# Patient Record
Sex: MALE | Race: BLACK OR AFRICAN AMERICAN | NOT HISPANIC OR LATINO | URBAN - METROPOLITAN AREA
[De-identification: names, ages, dates, MRNs, and addresses within clinical notes are randomized per-mention and may not be internally consistent; named-entity substitution may affect disease eponyms.]

---

## 2022-05-22 ENCOUNTER — INPATIENT (INPATIENT)
Facility: HOSPITAL | Age: 62
LOS: 14 days | Discharge: NURSING, REHAB OR LONG-TERM CARE | DRG: 219 | End: 2022-06-06
Attending: THORACIC SURGERY (CARDIOTHORACIC VASCULAR SURGERY) | Admitting: THORACIC SURGERY (CARDIOTHORACIC VASCULAR SURGERY)
Payer: COMMERCIAL

## 2022-05-22 VITALS
SYSTOLIC BLOOD PRESSURE: 126 MMHG | WEIGHT: 179.9 LBS | RESPIRATION RATE: 18 BRPM | OXYGEN SATURATION: 93 % | DIASTOLIC BLOOD PRESSURE: 80 MMHG | HEIGHT: 71 IN

## 2022-05-22 LAB
A1C WITH ESTIMATED AVERAGE GLUCOSE RESULT: 4.9 % — SIGNIFICANT CHANGE UP (ref 4–5.6)
ALBUMIN SERPL ELPH-MCNC: 3.2 G/DL — LOW (ref 3.3–5)
ALP SERPL-CCNC: 86 U/L — SIGNIFICANT CHANGE UP (ref 40–120)
ALT FLD-CCNC: 45 U/L — SIGNIFICANT CHANGE UP (ref 10–45)
AMPHET UR-MCNC: NEGATIVE — SIGNIFICANT CHANGE UP
ANION GAP SERPL CALC-SCNC: 13 MMOL/L — SIGNIFICANT CHANGE UP (ref 5–17)
APTT BLD: 28.9 SEC — SIGNIFICANT CHANGE UP (ref 27.5–35.5)
AST SERPL-CCNC: 15 U/L — SIGNIFICANT CHANGE UP (ref 10–40)
BARBITURATES UR SCN-MCNC: NEGATIVE — SIGNIFICANT CHANGE UP
BASOPHILS # BLD AUTO: 0.02 K/UL — SIGNIFICANT CHANGE UP (ref 0–0.2)
BASOPHILS NFR BLD AUTO: 0.3 % — SIGNIFICANT CHANGE UP (ref 0–2)
BENZODIAZ UR-MCNC: NEGATIVE — SIGNIFICANT CHANGE UP
BILIRUB SERPL-MCNC: 0.3 MG/DL — SIGNIFICANT CHANGE UP (ref 0.2–1.2)
BLD GP AB SCN SERPL QL: NEGATIVE — SIGNIFICANT CHANGE UP
BUN SERPL-MCNC: 35 MG/DL — HIGH (ref 7–23)
CALCIUM SERPL-MCNC: 8.4 MG/DL — SIGNIFICANT CHANGE UP (ref 8.4–10.5)
CHLORIDE SERPL-SCNC: 101 MMOL/L — SIGNIFICANT CHANGE UP (ref 96–108)
CHOLEST SERPL-MCNC: 83 MG/DL — SIGNIFICANT CHANGE UP
CK MB CFR SERPL CALC: <1 NG/ML — SIGNIFICANT CHANGE UP (ref 0–6.7)
CK SERPL-CCNC: 33 U/L — SIGNIFICANT CHANGE UP (ref 30–200)
CO2 SERPL-SCNC: 22 MMOL/L — SIGNIFICANT CHANGE UP (ref 22–31)
COCAINE METAB.OTHER UR-MCNC: NEGATIVE — SIGNIFICANT CHANGE UP
CREAT SERPL-MCNC: 2.29 MG/DL — HIGH (ref 0.5–1.3)
EGFR: 32 ML/MIN/1.73M2 — LOW
EOSINOPHIL # BLD AUTO: 0.09 K/UL — SIGNIFICANT CHANGE UP (ref 0–0.5)
EOSINOPHIL NFR BLD AUTO: 1.3 % — SIGNIFICANT CHANGE UP (ref 0–6)
ESTIMATED AVERAGE GLUCOSE: 94 MG/DL — SIGNIFICANT CHANGE UP (ref 68–114)
GLUCOSE SERPL-MCNC: 139 MG/DL — HIGH (ref 70–99)
HCT VFR BLD CALC: 31.3 % — LOW (ref 39–50)
HDLC SERPL-MCNC: 28 MG/DL — LOW
HGB BLD-MCNC: 10.7 G/DL — LOW (ref 13–17)
IMM GRANULOCYTES NFR BLD AUTO: 0.3 % — SIGNIFICANT CHANGE UP (ref 0–1.5)
INR BLD: 1.26 — HIGH (ref 0.88–1.16)
LACTATE SERPL-SCNC: 0.8 MMOL/L — SIGNIFICANT CHANGE UP (ref 0.5–2)
LIPID PNL WITH DIRECT LDL SERPL: 39 MG/DL — SIGNIFICANT CHANGE UP
LYMPHOCYTES # BLD AUTO: 1.14 K/UL — SIGNIFICANT CHANGE UP (ref 1–3.3)
LYMPHOCYTES # BLD AUTO: 16.5 % — SIGNIFICANT CHANGE UP (ref 13–44)
MCHC RBC-ENTMCNC: 29.5 PG — SIGNIFICANT CHANGE UP (ref 27–34)
MCHC RBC-ENTMCNC: 34.2 GM/DL — SIGNIFICANT CHANGE UP (ref 32–36)
MCV RBC AUTO: 86.2 FL — SIGNIFICANT CHANGE UP (ref 80–100)
METHADONE UR-MCNC: NEGATIVE — SIGNIFICANT CHANGE UP
MONOCYTES # BLD AUTO: 0.89 K/UL — SIGNIFICANT CHANGE UP (ref 0–0.9)
MONOCYTES NFR BLD AUTO: 12.9 % — SIGNIFICANT CHANGE UP (ref 2–14)
NEUTROPHILS # BLD AUTO: 4.73 K/UL — SIGNIFICANT CHANGE UP (ref 1.8–7.4)
NEUTROPHILS NFR BLD AUTO: 68.7 % — SIGNIFICANT CHANGE UP (ref 43–77)
NON HDL CHOLESTEROL: 55 MG/DL — SIGNIFICANT CHANGE UP
NRBC # BLD: 0 /100 WBCS — SIGNIFICANT CHANGE UP (ref 0–0)
NT-PROBNP SERPL-SCNC: 361 PG/ML — HIGH (ref 0–300)
OPIATES UR-MCNC: NEGATIVE — SIGNIFICANT CHANGE UP
PCP SPEC-MCNC: SIGNIFICANT CHANGE UP
PCP UR-MCNC: NEGATIVE — SIGNIFICANT CHANGE UP
PLATELET # BLD AUTO: 185 K/UL — SIGNIFICANT CHANGE UP (ref 150–400)
POTASSIUM SERPL-MCNC: 3.6 MMOL/L — SIGNIFICANT CHANGE UP (ref 3.5–5.3)
POTASSIUM SERPL-SCNC: 3.6 MMOL/L — SIGNIFICANT CHANGE UP (ref 3.5–5.3)
PROT SERPL-MCNC: 7 G/DL — SIGNIFICANT CHANGE UP (ref 6–8.3)
PROTHROM AB SERPL-ACNC: 15 SEC — HIGH (ref 10.5–13.4)
RBC # BLD: 3.63 M/UL — LOW (ref 4.2–5.8)
RBC # FLD: 13.6 % — SIGNIFICANT CHANGE UP (ref 10.3–14.5)
RH IG SCN BLD-IMP: POSITIVE — SIGNIFICANT CHANGE UP
RH IG SCN BLD-IMP: POSITIVE — SIGNIFICANT CHANGE UP
SODIUM SERPL-SCNC: 136 MMOL/L — SIGNIFICANT CHANGE UP (ref 135–145)
THC UR QL: NEGATIVE — SIGNIFICANT CHANGE UP
TRIGL SERPL-MCNC: 80 MG/DL — SIGNIFICANT CHANGE UP
TROPONIN T SERPL-MCNC: 0.02 NG/ML — HIGH (ref 0–0.01)
WBC # BLD: 6.89 K/UL — SIGNIFICANT CHANGE UP (ref 3.8–10.5)
WBC # FLD AUTO: 6.89 K/UL — SIGNIFICANT CHANGE UP (ref 3.8–10.5)

## 2022-05-22 PROCEDURE — 71045 X-RAY EXAM CHEST 1 VIEW: CPT | Mod: 26

## 2022-05-22 PROCEDURE — 36600 WITHDRAWAL OF ARTERIAL BLOOD: CPT | Mod: RT

## 2022-05-22 PROCEDURE — 99291 CRITICAL CARE FIRST HOUR: CPT

## 2022-05-22 RX ORDER — ATORVASTATIN CALCIUM 80 MG/1
40 TABLET, FILM COATED ORAL AT BEDTIME
Refills: 0 | Status: DISCONTINUED | OUTPATIENT
Start: 2022-05-22 | End: 2022-05-24

## 2022-05-22 RX ORDER — DEXTROSE 50 % IN WATER 50 %
15 SYRINGE (ML) INTRAVENOUS ONCE
Refills: 0 | Status: DISCONTINUED | OUTPATIENT
Start: 2022-05-22 | End: 2022-05-22

## 2022-05-22 RX ORDER — POTASSIUM CHLORIDE 20 MEQ
10 PACKET (EA) ORAL ONCE
Refills: 0 | Status: COMPLETED | OUTPATIENT
Start: 2022-05-22 | End: 2022-05-22

## 2022-05-22 RX ORDER — ACETAMINOPHEN 500 MG
650 TABLET ORAL EVERY 6 HOURS
Refills: 0 | Status: DISCONTINUED | OUTPATIENT
Start: 2022-05-22 | End: 2022-05-29

## 2022-05-22 RX ORDER — PANTOPRAZOLE SODIUM 20 MG/1
40 TABLET, DELAYED RELEASE ORAL
Refills: 0 | Status: DISCONTINUED | OUTPATIENT
Start: 2022-05-22 | End: 2022-05-24

## 2022-05-22 RX ORDER — DEXTROSE 50 % IN WATER 50 %
25 SYRINGE (ML) INTRAVENOUS ONCE
Refills: 0 | Status: DISCONTINUED | OUTPATIENT
Start: 2022-05-22 | End: 2022-05-24

## 2022-05-22 RX ORDER — GLUCAGON INJECTION, SOLUTION 0.5 MG/.1ML
1 INJECTION, SOLUTION SUBCUTANEOUS ONCE
Refills: 0 | Status: DISCONTINUED | OUTPATIENT
Start: 2022-05-22 | End: 2022-05-24

## 2022-05-22 RX ORDER — INSULIN LISPRO 100/ML
VIAL (ML) SUBCUTANEOUS
Refills: 0 | Status: DISCONTINUED | OUTPATIENT
Start: 2022-05-22 | End: 2022-05-24

## 2022-05-22 RX ORDER — NICARDIPINE HYDROCHLORIDE 30 MG/1
5 CAPSULE, EXTENDED RELEASE ORAL
Qty: 40 | Refills: 0 | Status: DISCONTINUED | OUTPATIENT
Start: 2022-05-22 | End: 2022-05-24

## 2022-05-22 RX ORDER — SODIUM CHLORIDE 9 MG/ML
3 INJECTION INTRAMUSCULAR; INTRAVENOUS; SUBCUTANEOUS EVERY 8 HOURS
Refills: 0 | Status: DISCONTINUED | OUTPATIENT
Start: 2022-05-22 | End: 2022-06-06

## 2022-05-22 RX ORDER — SODIUM CHLORIDE 9 MG/ML
1000 INJECTION, SOLUTION INTRAVENOUS
Refills: 0 | Status: DISCONTINUED | OUTPATIENT
Start: 2022-05-22 | End: 2022-05-22

## 2022-05-22 RX ORDER — SODIUM CHLORIDE 9 MG/ML
1000 INJECTION, SOLUTION INTRAVENOUS
Refills: 0 | Status: DISCONTINUED | OUTPATIENT
Start: 2022-05-22 | End: 2022-05-25

## 2022-05-22 RX ADMIN — SODIUM CHLORIDE 3 MILLILITER(S): 9 INJECTION INTRAMUSCULAR; INTRAVENOUS; SUBCUTANEOUS at 22:12

## 2022-05-22 RX ADMIN — SODIUM CHLORIDE 50 MILLILITER(S): 9 INJECTION, SOLUTION INTRAVENOUS at 23:58

## 2022-05-22 RX ADMIN — Medication 10 MILLIEQUIVALENT(S): at 23:58

## 2022-05-22 RX ADMIN — ATORVASTATIN CALCIUM 40 MILLIGRAM(S): 80 TABLET, FILM COATED ORAL at 23:58

## 2022-05-22 RX ADMIN — NICARDIPINE HYDROCHLORIDE 25 MG/HR: 30 CAPSULE, EXTENDED RELEASE ORAL at 23:58

## 2022-05-22 NOTE — PATIENT PROFILE ADULT - FALL HARM RISK - RISK INTERVENTIONS
Assistance with ambulation/Communicate Fall Risk and Risk Factors to all staff, patient, and family/Reinforce activity limits and safety measures with patient and family/Visual Cue: Yellow wristband/Bed in lowest position, wheels locked, appropriate side rails in place/Call bell, personal items and telephone in reach/Instruct patient to call for assistance before getting out of bed or chair/Non-slip footwear when patient is out of bed/[place] to call system/Physically safe environment - no spills, clutter or unnecessary equipment/Purposeful Proactive Rounding/Room/bathroom lighting operational, light cord in reach

## 2022-05-22 NOTE — H&P ADULT - NSHPSOCIALHISTORY_GEN_ALL_CORE
Tobacco: current 1 pack a day smoker, 30 years  Alcohol: 1 drink socially a week  Elicit drugs: denies ever

## 2022-05-22 NOTE — H&P ADULT - HISTORY OF PRESENT ILLNESS
61 year old male with PMHx of HTN, HLD, Current smoker (30 pack year history), admitted to outside hospital with CP on 5/16/2022 , cardiac cath shows non-obstructive CAD, EF without valvulopathy and normal EF, upon discharge (5/21/2022) patient found to be febrile and underwent infectious work-up. CT chest shows thoracic aortic changes with follow up CTA chest showing chronic thrombosed dissection versus intramural hematoma along the thoracic aorta, small pericardial effusion and left pleural effusion vs hemothorax. Patient transferred to [facility] under the care of Dr. Ford for surgical evaluation.   61 year old male with PMHx of HTN, HLD, Current smoker (30 pack year history), admitted to outside hospital with CP on 5/16/2022 , cardiac cath shows non-obstructive CAD, EF without valvulopathy and normal EF, upon discharge (5/21/2022) patient found to be febrile and underwent infectious work-up. CT chest shows thoracic aortic changes with follow up CTA chest showing chronic thrombosed dissection versus intramural hematoma along the thoracic aorta, small pericardial effusion and left pleural effusion vs hemothorax. Patient transferred to [facility] under the care of Dr. Ford for surgical evaluation.  Upon arrival patient in no acute distress and denies all pain, patient states only has history of HTN but has been off medication for 6 months as he was unable to refill his medications.

## 2022-05-22 NOTE — PROGRESS NOTE ADULT - SUBJECTIVE AND OBJECTIVE BOX
INTERVAL HPI/OVERNIGHT EVENTS:    Intramural aortic hematoma    62yo AA Male Hx active tobacco use (30 pk years), HTN - nonadherent to meds, Chol presented to [facility] 5/18 with chest pain and severe HA - (+)NSTEMI - [trop 0.07] & uncontrolled HTN     CT Head - chronic Left basal ganglia lacunar infarct; chronic small vessel ischemia; no hemorrhage     Inc LFT and fever  Abd sono 5/20: hepatomegaly; left renal cyst; bilateral pleural effusion    Cr 1.8 at presentation  Cath: non-obstructive CAD, EF 55%; no valvular disease   coreg/catapress/norvasc/imdur/nifedipine started for BP control    anticipated d/c home 5/21 - developed abd pain - vomiting & fever (100.7) - d/c deferred  CT 5/21: crescentic hyperdensity in descending thoracic aorta and faintly in ascending thoracic aorta concerning for intramural hematoma (new c/w 9/2021 imaging). small-mod pericardial effusion    based on above findings -   CTa Chest 5/22: periaortic hematoma alone the length of the thoracic aortic extending to upper abd; No dissection flap identified. moderate pericardial effusion, small pleural effusions L>> Rt   CTa A/P 5/22: chronic thrombosed dissection vs intramural hematoma along thoracic aorta     transferred to [facility]  patient awake/alert and itneractive - denies chest pains sxs - none for days per patient  126/60 HR 60 sinus at presentation     PMHx includes but is not limited to:   HTN (hypertension)  HLD (hyperlipidemia)  Smoker  DM    ICU Vital Signs Last 24 Hrs  T(C): 36.8 (22 May 2022 22:18), Max: 36.8 (22 May 2022 22:18)  T(F): 98.3 (22 May 2022 22:18), Max: 98.3 (22 May 2022 22:18)  HR: 61 (22 May 2022 23:25) (61 - 63) sinus   BP: 113/68 (22 May 2022 23:25) (113/68 - 126/80)  BP(mean): 85 (22 May 2022 23:25) (85 - 98)  ABP: 134/72 (22 May 2022 23:25) (134/72 - 142/70)  ABP(mean): 90 (22 May 2022 23:25) (89 - 90)  RR: 15 (22 May 2022 23:25) (15 - 18)  SpO2: 94% (22 May 2022 23:25) (93% - 94%) RA    Qtts: None     I&O's Summary    Physical Exam    Heart - regular (-)rub/gallop  Lungs - CTA anterior (-)rub/gallop  Abd - (+) BS soft NTND (-)r/r/g  Ext - warm to touch; no cyanosis/clubbing - palpable peripheral pulses appreciated UE/LE bilaterally  Neuro - alert/oriented and interactive - non focal/following simple commands  Skin - no rash/lesions appreciated     LABS:                        10.7   6.89  )-----------( 185      ( 22 May 2022 22:36 )             31.3     05-22    136  |  101  |  35<H>  ----------------------------<  139<H>  3.6   |  22  |  2.29<H>    Ca    8.4      22 May 2022 22:39    TPro  7.0  /  Alb  3.2<L>  /  TBili  0.3  /  DBili  x   /  AST  15  /  ALT  45  /  AlkPhos  86  05-22    PT/INR - ( 22 May 2022 22:36 )   PT: 15.0 sec;   INR: 1.26     PTT - ( 22 May 2022 22:36 )  PTT:28.9 sec    RADIOLOGY & ADDITIONAL STUDIES:    Patient with initial presentation to [facility] with chest pain and uncontrolled HTN - NSTEMI - cath negative. Planned discharge when developed fever/vomiting and abd pain prompting imaging  - suspicious findings warranting further investigation and CTa performed - intramural aortic hematoma - transferred to [facility] for assessment and possible intervention     1. CV  hemodynamically stable/sinus rhythm   elise placed for closer BP monitoring - noted 20 mmHg higher that correlating cuff pressure  bradycardia noted - cardene infusion for now to maintain tight BP control  ECHO in am and may require additional imaging (3 phase scan) to better assess - defer to CTS after review of most recent images  LA 0.8 CPK 33/Trop 0.02   check Utox - patient denies drug or ETOH use   patient to inform immediately of any pain sxs - reviewed with patient  smoking cessation education and support when appropriate     2. Renal   CKD? - Cr 1.8 at presentation now Cr 2.29 (PARISH on CKD?)  s/p cath and CTa  may require additional contrast imaging to assess pathology and to best determine intervention required  start IVF now and monitor Cr/UO and lytes closely  avoid nephrotoxins    glycemic control - serum 139; HgA1c 4.9  COVID PCR per protocol - influenza and COVID testing negative at transferring institution   NPO for now    SCD and GI prophylaxis     d/w patient/staff and CTS     I have spent/provided stated minutes of critical care time to this patient: 60

## 2022-05-22 NOTE — H&P ADULT - ASSESSMENT
61 year old male with PMHx of HTN, HLD, Current smoker (30 pack year history), admitted to outside hospital with CP on 5/16/2022 , cardiac cath shows non-obstructive CAD, EF without valvulopathy and normal EF, upon discharge planning (5/21/2022) patient found to be febrile and underwent infectious work-up. CT chest shows thoracic aortic changes with follow up CTA chest showing chronic thrombosed dissection versus intramural hematoma along the thoracic aorta, small pericardial effusion and left pleural effusion vs hemothorax. Patient transferred to [facility] under the care of Dr. Ford for surgical evaluation.      Intramural Hematoma   - Repeat CT chest Dissection protocol in AM   - Right radial arterial line placed for blood pressure management      - Nicardipine gtt for SBP <120  - CXR, Urinalysis, Blood Work including: CMP, CBC, A1C, PTT/INR, Lipid panel, Cardiac enzymes, TSH, Pro-BNP, T&Sx2, ABG, TTE, PFT, EKG, Carotid Ultrasound, tox screen pending   - ASA and Heparin held in setting of hematoma       Pericardial effusion   - questionable pericardial effusion vs hemopericardium associated with dissection?   - No clinical evidence of tamponade   - Lactate pending  - TTE pending      HTN  - management as above     HLD  - Continue statin     Preventative   - SCD only, chemical VTE held insetting of dissection / pericardial effusion     Discussed with CTICU attending

## 2022-05-23 ENCOUNTER — TRANSCRIPTION ENCOUNTER (OUTPATIENT)
Age: 62
End: 2022-05-23

## 2022-05-23 PROBLEM — Z00.00 ENCOUNTER FOR PREVENTIVE HEALTH EXAMINATION: Status: ACTIVE | Noted: 2022-05-23

## 2022-05-23 LAB
A1C WITH ESTIMATED AVERAGE GLUCOSE RESULT: 5.1 % — SIGNIFICANT CHANGE UP (ref 4–5.6)
ALBUMIN SERPL ELPH-MCNC: 2.9 G/DL — LOW (ref 3.3–5)
ALBUMIN SERPL ELPH-MCNC: 3.2 G/DL — LOW (ref 3.3–5)
ALBUMIN SERPL ELPH-MCNC: 3.3 G/DL — SIGNIFICANT CHANGE UP (ref 3.3–5)
ALP SERPL-CCNC: 76 U/L — SIGNIFICANT CHANGE UP (ref 40–120)
ALP SERPL-CCNC: 79 U/L — SIGNIFICANT CHANGE UP (ref 40–120)
ALP SERPL-CCNC: 83 U/L — SIGNIFICANT CHANGE UP (ref 40–120)
ALT FLD-CCNC: 34 U/L — SIGNIFICANT CHANGE UP (ref 10–45)
ALT FLD-CCNC: 37 U/L — SIGNIFICANT CHANGE UP (ref 10–45)
ALT FLD-CCNC: 41 U/L — SIGNIFICANT CHANGE UP (ref 10–45)
ANION GAP SERPL CALC-SCNC: 10 MMOL/L — SIGNIFICANT CHANGE UP (ref 5–17)
ANION GAP SERPL CALC-SCNC: 11 MMOL/L — SIGNIFICANT CHANGE UP (ref 5–17)
ANION GAP SERPL CALC-SCNC: 11 MMOL/L — SIGNIFICANT CHANGE UP (ref 5–17)
ANION GAP SERPL CALC-SCNC: 13 MMOL/L — SIGNIFICANT CHANGE UP (ref 5–17)
APPEARANCE UR: ABNORMAL
APTT BLD: 26.7 SEC — LOW (ref 27.5–35.5)
APTT BLD: 27.1 SEC — LOW (ref 27.5–35.5)
APTT BLD: 27.3 SEC — LOW (ref 27.5–35.5)
AST SERPL-CCNC: 11 U/L — SIGNIFICANT CHANGE UP (ref 10–40)
AST SERPL-CCNC: 13 U/L — SIGNIFICANT CHANGE UP (ref 10–40)
AST SERPL-CCNC: 14 U/L — SIGNIFICANT CHANGE UP (ref 10–40)
BACTERIA # UR AUTO: PRESENT /HPF
BILIRUB SERPL-MCNC: 0.4 MG/DL — SIGNIFICANT CHANGE UP (ref 0.2–1.2)
BILIRUB SERPL-MCNC: 0.4 MG/DL — SIGNIFICANT CHANGE UP (ref 0.2–1.2)
BILIRUB SERPL-MCNC: 0.6 MG/DL — SIGNIFICANT CHANGE UP (ref 0.2–1.2)
BILIRUB UR-MCNC: NEGATIVE — SIGNIFICANT CHANGE UP
BUN SERPL-MCNC: 22 MG/DL — SIGNIFICANT CHANGE UP (ref 7–23)
BUN SERPL-MCNC: 28 MG/DL — HIGH (ref 7–23)
BUN SERPL-MCNC: 28 MG/DL — HIGH (ref 7–23)
BUN SERPL-MCNC: 32 MG/DL — HIGH (ref 7–23)
CALCIUM SERPL-MCNC: 8.2 MG/DL — LOW (ref 8.4–10.5)
CALCIUM SERPL-MCNC: 8.4 MG/DL — SIGNIFICANT CHANGE UP (ref 8.4–10.5)
CALCIUM SERPL-MCNC: 8.4 MG/DL — SIGNIFICANT CHANGE UP (ref 8.4–10.5)
CALCIUM SERPL-MCNC: 8.6 MG/DL — SIGNIFICANT CHANGE UP (ref 8.4–10.5)
CHLORIDE SERPL-SCNC: 102 MMOL/L — SIGNIFICANT CHANGE UP (ref 96–108)
CHLORIDE SERPL-SCNC: 103 MMOL/L — SIGNIFICANT CHANGE UP (ref 96–108)
CO2 SERPL-SCNC: 21 MMOL/L — LOW (ref 22–31)
CO2 SERPL-SCNC: 21 MMOL/L — LOW (ref 22–31)
CO2 SERPL-SCNC: 22 MMOL/L — SIGNIFICANT CHANGE UP (ref 22–31)
CO2 SERPL-SCNC: 22 MMOL/L — SIGNIFICANT CHANGE UP (ref 22–31)
COLOR SPEC: YELLOW — SIGNIFICANT CHANGE UP
COMMENT - URINE: SIGNIFICANT CHANGE UP
COMMENT - URINE: SIGNIFICANT CHANGE UP
CREAT SERPL-MCNC: 1.9 MG/DL — HIGH (ref 0.5–1.3)
CREAT SERPL-MCNC: 1.98 MG/DL — HIGH (ref 0.5–1.3)
CREAT SERPL-MCNC: 2.06 MG/DL — HIGH (ref 0.5–1.3)
CREAT SERPL-MCNC: 2.12 MG/DL — HIGH (ref 0.5–1.3)
DIFF PNL FLD: ABNORMAL
EGFR: 35 ML/MIN/1.73M2 — LOW
EGFR: 36 ML/MIN/1.73M2 — LOW
EGFR: 38 ML/MIN/1.73M2 — LOW
EGFR: 40 ML/MIN/1.73M2 — LOW
EPI CELLS # UR: SIGNIFICANT CHANGE UP /HPF (ref 0–5)
ESTIMATED AVERAGE GLUCOSE: 100 MG/DL — SIGNIFICANT CHANGE UP (ref 68–114)
GAS PNL BLDA: SIGNIFICANT CHANGE UP
GLUCOSE BLDC GLUCOMTR-MCNC: 124 MG/DL — HIGH (ref 70–99)
GLUCOSE BLDC GLUCOMTR-MCNC: 125 MG/DL — HIGH (ref 70–99)
GLUCOSE BLDC GLUCOMTR-MCNC: 161 MG/DL — HIGH (ref 70–99)
GLUCOSE SERPL-MCNC: 123 MG/DL — HIGH (ref 70–99)
GLUCOSE SERPL-MCNC: 125 MG/DL — HIGH (ref 70–99)
GLUCOSE SERPL-MCNC: 141 MG/DL — HIGH (ref 70–99)
GLUCOSE SERPL-MCNC: 208 MG/DL — HIGH (ref 70–99)
GLUCOSE UR QL: NEGATIVE — SIGNIFICANT CHANGE UP
HCT VFR BLD CALC: 30.6 % — LOW (ref 39–50)
HCT VFR BLD CALC: 31.2 % — LOW (ref 39–50)
HCT VFR BLD CALC: 32 % — LOW (ref 39–50)
HCT VFR BLD CALC: 32.8 % — LOW (ref 39–50)
HCV AB S/CO SERPL IA: 0.04 S/CO — SIGNIFICANT CHANGE UP
HCV AB SERPL-IMP: SIGNIFICANT CHANGE UP
HGB BLD-MCNC: 10.4 G/DL — LOW (ref 13–17)
HGB BLD-MCNC: 10.5 G/DL — LOW (ref 13–17)
HGB BLD-MCNC: 10.9 G/DL — LOW (ref 13–17)
HGB BLD-MCNC: 11.2 G/DL — LOW (ref 13–17)
HYALINE CASTS # UR AUTO: SIGNIFICANT CHANGE UP /LPF (ref 0–2)
INR BLD: 1.25 — HIGH (ref 0.88–1.16)
INR BLD: 1.25 — HIGH (ref 0.88–1.16)
INR BLD: 1.31 — HIGH (ref 0.88–1.16)
KETONES UR-MCNC: NEGATIVE — SIGNIFICANT CHANGE UP
LACTATE SERPL-SCNC: 0.6 MMOL/L — SIGNIFICANT CHANGE UP (ref 0.5–2)
LACTATE SERPL-SCNC: 0.7 MMOL/L — SIGNIFICANT CHANGE UP (ref 0.5–2)
LACTATE SERPL-SCNC: 1.3 MMOL/L — SIGNIFICANT CHANGE UP (ref 0.5–2)
LEUKOCYTE ESTERASE UR-ACNC: NEGATIVE — SIGNIFICANT CHANGE UP
MAGNESIUM SERPL-MCNC: 2 MG/DL — SIGNIFICANT CHANGE UP (ref 1.6–2.6)
MAGNESIUM SERPL-MCNC: 2.1 MG/DL — SIGNIFICANT CHANGE UP (ref 1.6–2.6)
MAGNESIUM SERPL-MCNC: 2.1 MG/DL — SIGNIFICANT CHANGE UP (ref 1.6–2.6)
MAGNESIUM SERPL-MCNC: 2.2 MG/DL — SIGNIFICANT CHANGE UP (ref 1.6–2.6)
MCHC RBC-ENTMCNC: 28.8 PG — SIGNIFICANT CHANGE UP (ref 27–34)
MCHC RBC-ENTMCNC: 29.2 PG — SIGNIFICANT CHANGE UP (ref 27–34)
MCHC RBC-ENTMCNC: 29.8 PG — SIGNIFICANT CHANGE UP (ref 27–34)
MCHC RBC-ENTMCNC: 29.9 PG — SIGNIFICANT CHANGE UP (ref 27–34)
MCHC RBC-ENTMCNC: 33.7 GM/DL — SIGNIFICANT CHANGE UP (ref 32–36)
MCHC RBC-ENTMCNC: 34 GM/DL — SIGNIFICANT CHANGE UP (ref 32–36)
MCHC RBC-ENTMCNC: 34.1 GM/DL — SIGNIFICANT CHANGE UP (ref 32–36)
MCHC RBC-ENTMCNC: 34.1 GM/DL — SIGNIFICANT CHANGE UP (ref 32–36)
MCV RBC AUTO: 85.7 FL — SIGNIFICANT CHANGE UP (ref 80–100)
MCV RBC AUTO: 86 FL — SIGNIFICANT CHANGE UP (ref 80–100)
MCV RBC AUTO: 87.4 FL — SIGNIFICANT CHANGE UP (ref 80–100)
MCV RBC AUTO: 87.7 FL — SIGNIFICANT CHANGE UP (ref 80–100)
NITRITE UR-MCNC: NEGATIVE — SIGNIFICANT CHANGE UP
NRBC # BLD: 0 /100 WBCS — SIGNIFICANT CHANGE UP (ref 0–0)
PH UR: 5.5 — SIGNIFICANT CHANGE UP (ref 5–8)
PHOSPHATE SERPL-MCNC: 3 MG/DL — SIGNIFICANT CHANGE UP (ref 2.5–4.5)
PHOSPHATE SERPL-MCNC: 3 MG/DL — SIGNIFICANT CHANGE UP (ref 2.5–4.5)
PHOSPHATE SERPL-MCNC: 3.3 MG/DL — SIGNIFICANT CHANGE UP (ref 2.5–4.5)
PHOSPHATE SERPL-MCNC: 3.3 MG/DL — SIGNIFICANT CHANGE UP (ref 2.5–4.5)
PLATELET # BLD AUTO: 182 K/UL — SIGNIFICANT CHANGE UP (ref 150–400)
PLATELET # BLD AUTO: 193 K/UL — SIGNIFICANT CHANGE UP (ref 150–400)
PLATELET # BLD AUTO: 199 K/UL — SIGNIFICANT CHANGE UP (ref 150–400)
PLATELET # BLD AUTO: 203 K/UL — SIGNIFICANT CHANGE UP (ref 150–400)
POTASSIUM SERPL-MCNC: 3.5 MMOL/L — SIGNIFICANT CHANGE UP (ref 3.5–5.3)
POTASSIUM SERPL-MCNC: 3.6 MMOL/L — SIGNIFICANT CHANGE UP (ref 3.5–5.3)
POTASSIUM SERPL-MCNC: 3.6 MMOL/L — SIGNIFICANT CHANGE UP (ref 3.5–5.3)
POTASSIUM SERPL-MCNC: 4.1 MMOL/L — SIGNIFICANT CHANGE UP (ref 3.5–5.3)
POTASSIUM SERPL-SCNC: 3.5 MMOL/L — SIGNIFICANT CHANGE UP (ref 3.5–5.3)
POTASSIUM SERPL-SCNC: 3.6 MMOL/L — SIGNIFICANT CHANGE UP (ref 3.5–5.3)
POTASSIUM SERPL-SCNC: 3.6 MMOL/L — SIGNIFICANT CHANGE UP (ref 3.5–5.3)
POTASSIUM SERPL-SCNC: 4.1 MMOL/L — SIGNIFICANT CHANGE UP (ref 3.5–5.3)
PROT SERPL-MCNC: 6.4 G/DL — SIGNIFICANT CHANGE UP (ref 6–8.3)
PROT SERPL-MCNC: 7 G/DL — SIGNIFICANT CHANGE UP (ref 6–8.3)
PROT SERPL-MCNC: 7.2 G/DL — SIGNIFICANT CHANGE UP (ref 6–8.3)
PROT UR-MCNC: ABNORMAL MG/DL
PROTHROM AB SERPL-ACNC: 14.9 SEC — HIGH (ref 10.5–13.4)
PROTHROM AB SERPL-ACNC: 14.9 SEC — HIGH (ref 10.5–13.4)
PROTHROM AB SERPL-ACNC: 15.6 SEC — HIGH (ref 10.5–13.4)
RBC # BLD: 3.56 M/UL — LOW (ref 4.2–5.8)
RBC # BLD: 3.64 M/UL — LOW (ref 4.2–5.8)
RBC # BLD: 3.66 M/UL — LOW (ref 4.2–5.8)
RBC # BLD: 3.74 M/UL — LOW (ref 4.2–5.8)
RBC # FLD: 13.6 % — SIGNIFICANT CHANGE UP (ref 10.3–14.5)
RBC # FLD: 13.8 % — SIGNIFICANT CHANGE UP (ref 10.3–14.5)
RBC # FLD: 13.9 % — SIGNIFICANT CHANGE UP (ref 10.3–14.5)
RBC # FLD: 14 % — SIGNIFICANT CHANGE UP (ref 10.3–14.5)
RBC CASTS # UR COMP ASSIST: < 5 /HPF — SIGNIFICANT CHANGE UP
SARS-COV-2 RNA SPEC QL NAA+PROBE: SIGNIFICANT CHANGE UP
SODIUM SERPL-SCNC: 133 MMOL/L — LOW (ref 135–145)
SODIUM SERPL-SCNC: 135 MMOL/L — SIGNIFICANT CHANGE UP (ref 135–145)
SODIUM SERPL-SCNC: 136 MMOL/L — SIGNIFICANT CHANGE UP (ref 135–145)
SODIUM SERPL-SCNC: 136 MMOL/L — SIGNIFICANT CHANGE UP (ref 135–145)
SP GR SPEC: 1.02 — SIGNIFICANT CHANGE UP (ref 1–1.03)
TSH SERPL-MCNC: 2.66 UIU/ML — SIGNIFICANT CHANGE UP (ref 0.27–4.2)
UROBILINOGEN FLD QL: 0.2 E.U./DL — SIGNIFICANT CHANGE UP
WBC # BLD: 6.36 K/UL — SIGNIFICANT CHANGE UP (ref 3.8–10.5)
WBC # BLD: 6.39 K/UL — SIGNIFICANT CHANGE UP (ref 3.8–10.5)
WBC # BLD: 6.72 K/UL — SIGNIFICANT CHANGE UP (ref 3.8–10.5)
WBC # BLD: 6.85 K/UL — SIGNIFICANT CHANGE UP (ref 3.8–10.5)
WBC # FLD AUTO: 6.36 K/UL — SIGNIFICANT CHANGE UP (ref 3.8–10.5)
WBC # FLD AUTO: 6.39 K/UL — SIGNIFICANT CHANGE UP (ref 3.8–10.5)
WBC # FLD AUTO: 6.72 K/UL — SIGNIFICANT CHANGE UP (ref 3.8–10.5)
WBC # FLD AUTO: 6.85 K/UL — SIGNIFICANT CHANGE UP (ref 3.8–10.5)
WBC UR QL: < 5 /HPF — SIGNIFICANT CHANGE UP

## 2022-05-23 PROCEDURE — 99292 CRITICAL CARE ADDL 30 MIN: CPT

## 2022-05-23 PROCEDURE — 99291 CRITICAL CARE FIRST HOUR: CPT

## 2022-05-23 PROCEDURE — 93880 EXTRACRANIAL BILAT STUDY: CPT | Mod: 26

## 2022-05-23 PROCEDURE — 93306 TTE W/DOPPLER COMPLETE: CPT | Mod: 26

## 2022-05-23 PROCEDURE — 70450 CT HEAD/BRAIN W/O DYE: CPT | Mod: 26

## 2022-05-23 PROCEDURE — 93010 ELECTROCARDIOGRAM REPORT: CPT

## 2022-05-23 PROCEDURE — 71275 CT ANGIOGRAPHY CHEST: CPT | Mod: 26

## 2022-05-23 PROCEDURE — 74174 CTA ABD&PLVS W/CONTRAST: CPT | Mod: 26

## 2022-05-23 RX ORDER — AMLODIPINE BESYLATE 2.5 MG/1
5 TABLET ORAL DAILY
Refills: 0 | Status: DISCONTINUED | OUTPATIENT
Start: 2022-05-23 | End: 2022-05-24

## 2022-05-23 RX ORDER — CHLORHEXIDINE GLUCONATE 213 G/1000ML
1 SOLUTION TOPICAL ONCE
Refills: 0 | Status: COMPLETED | OUTPATIENT
Start: 2022-05-23 | End: 2022-05-23

## 2022-05-23 RX ORDER — POTASSIUM CHLORIDE 20 MEQ
40 PACKET (EA) ORAL ONCE
Refills: 0 | Status: COMPLETED | OUTPATIENT
Start: 2022-05-23 | End: 2022-05-23

## 2022-05-23 RX ORDER — LABETALOL HCL 100 MG
10 TABLET ORAL ONCE
Refills: 0 | Status: COMPLETED | OUTPATIENT
Start: 2022-05-23 | End: 2022-05-23

## 2022-05-23 RX ORDER — ESMOLOL HCL 100MG/10ML
50 VIAL (ML) INTRAVENOUS
Qty: 2500 | Refills: 0 | Status: DISCONTINUED | OUTPATIENT
Start: 2022-05-23 | End: 2022-05-24

## 2022-05-23 RX ORDER — CHLORHEXIDINE GLUCONATE 213 G/1000ML
1 SOLUTION TOPICAL ONCE
Refills: 0 | Status: COMPLETED | OUTPATIENT
Start: 2022-05-24 | End: 2022-05-24

## 2022-05-23 RX ORDER — LABETALOL HCL 100 MG
4 TABLET ORAL
Qty: 1000 | Refills: 0 | Status: DISCONTINUED | OUTPATIENT
Start: 2022-05-23 | End: 2022-05-23

## 2022-05-23 RX ORDER — LABETALOL HCL 100 MG
1 TABLET ORAL
Qty: 400 | Refills: 0 | Status: DISCONTINUED | OUTPATIENT
Start: 2022-05-23 | End: 2022-05-24

## 2022-05-23 RX ORDER — CHLORHEXIDINE GLUCONATE 213 G/1000ML
5 SOLUTION TOPICAL ONCE
Refills: 0 | Status: COMPLETED | OUTPATIENT
Start: 2022-05-23 | End: 2022-05-23

## 2022-05-23 RX ADMIN — Medication 40 MILLIEQUIVALENT(S): at 17:34

## 2022-05-23 RX ADMIN — SODIUM CHLORIDE 3 MILLILITER(S): 9 INJECTION INTRAMUSCULAR; INTRAVENOUS; SUBCUTANEOUS at 05:09

## 2022-05-23 RX ADMIN — PANTOPRAZOLE SODIUM 40 MILLIGRAM(S): 20 TABLET, DELAYED RELEASE ORAL at 07:33

## 2022-05-23 RX ADMIN — CHLORHEXIDINE GLUCONATE 1 APPLICATION(S): 213 SOLUTION TOPICAL at 20:39

## 2022-05-23 RX ADMIN — Medication 10 MILLIGRAM(S): at 08:00

## 2022-05-23 RX ADMIN — NICARDIPINE HYDROCHLORIDE 25 MG/HR: 30 CAPSULE, EXTENDED RELEASE ORAL at 07:33

## 2022-05-23 RX ADMIN — SODIUM CHLORIDE 3 MILLILITER(S): 9 INJECTION INTRAMUSCULAR; INTRAVENOUS; SUBCUTANEOUS at 20:40

## 2022-05-23 RX ADMIN — NICARDIPINE HYDROCHLORIDE 25 MG/HR: 30 CAPSULE, EXTENDED RELEASE ORAL at 04:04

## 2022-05-23 RX ADMIN — NICARDIPINE HYDROCHLORIDE 25 MG/HR: 30 CAPSULE, EXTENDED RELEASE ORAL at 23:30

## 2022-05-23 RX ADMIN — AMLODIPINE BESYLATE 5 MILLIGRAM(S): 2.5 TABLET ORAL at 07:57

## 2022-05-23 RX ADMIN — Medication 2: at 21:38

## 2022-05-23 RX ADMIN — Medication 60 MG/MIN: at 09:23

## 2022-05-23 RX ADMIN — Medication 60 MG/MIN: at 15:49

## 2022-05-23 RX ADMIN — ATORVASTATIN CALCIUM 40 MILLIGRAM(S): 80 TABLET, FILM COATED ORAL at 21:38

## 2022-05-23 RX ADMIN — Medication 24.5 MICROGRAM(S)/KG/MIN: at 21:38

## 2022-05-23 RX ADMIN — CHLORHEXIDINE GLUCONATE 1 APPLICATION(S): 213 SOLUTION TOPICAL at 20:37

## 2022-05-23 RX ADMIN — Medication 60 MG/MIN: at 13:24

## 2022-05-23 RX ADMIN — SODIUM CHLORIDE 3 MILLILITER(S): 9 INJECTION INTRAMUSCULAR; INTRAVENOUS; SUBCUTANEOUS at 15:23

## 2022-05-23 NOTE — CONSULT NOTE ADULT - SUBJECTIVE AND OBJECTIVE BOX
**Stroke/Neurology Consult***    HPI: 61 year old male with PMHx of HTN, HLD, Current smoker (30 pack year history), admitted to outside hospital with CP on 2022 , cardiac cath shows non-obstructive CAD, EF without valvulopathy and normal EF, upon discharge (2022) patient found to be febrile and underwent infectious work-up. CT chest shows thoracic aortic changes with follow up CTA chest showing chronic thrombosed dissection versus intramural hematoma along the thoracic aorta, small pericardial effusion and left pleural effusion vs hemothorax. Patient transferred to [facility] under the care of Dr. Ford for surgical evaluation.  Upon arrival patient in no acute distress and denies all pain, patient states only has history of HTN but has been off medication for 6 months as he was unable to refill his medications.     Pt seen and examined for baseline neurologic exam prior to aortic dissection repair.  Pt does not want to talk about his medical history or if he has a history of neurologic events.     PAST MEDICAL & SURGICAL HISTORY:  HTN (hypertension)      HLD (hyperlipidemia)      Smoker      No significant past surgical history          FAMILY HISTORY:  No pertinent family history in first degree relatives        SOCIAL HISTORY:   Smoking status: 30 pack years  Drinking: does not participate   Drug Use: does not participate.    ROS: ***  Constitutional: No fever, weight loss or fatigue  Eyes: No eye pain, visual disturbances, or discharge  ENMT:  No difficulty hearing, tinnitus, vertigo; No sinus or throat pain  Neck: No pain or stiffness  Respiratory: No cough, wheezing, chills or hemoptysis  Cardiovascular: No chest pain, palpitations, shortness of breath, dizziness or leg swelling  Gastrointestinal: No abdominal pain. No nausea, vomiting or hematemesis; No diarrhea or constipation. Nohematochezia.  Genitourinary: No dysuria, frequency, hematuria or incontinence  Neurological: As per HPI  Skin: No itching, burning, rashes or lesions   Endocrine: No heat or cold intolerance; No hair loss  Musculoskeletal: No joint pain or swelling; No muscle, back or extremity pain  Psychiatric: No depression, anxiety, mood swings or difficulty sleeping  Heme/Lymph: No easy bruising or bleeding gums    T(C): 36.8 (22 @ 14:25), Max: 37.1 (22 @ 00:54)  HR: 56 (22 @ 16:00) (56 - 69)  BP: 117/70 (22 @ 10:00) (111/66 - 138/80)  RR: 14 (22 @ 16:00) (14 - 18)  SpO2: 97% (22 @ 16:00) (93% - 97%)    MEDICATION RECONCILIATION   MEDICATIONS  (STANDING):  amLODIPine   Tablet 5 milliGRAM(s) Oral daily  atorvastatin 40 milliGRAM(s) Oral at bedtime  chlorhexidine 0.12% Liquid 5 milliLiter(s) Swish and Spit once  chlorhexidine 4% Liquid 1 Application(s) Topical once  chlorhexidine 4% Liquid 1 Application(s) Topical once  dextrose 50% Injectable 25 Gram(s) IV Push once  glucagon  Injectable 1 milliGRAM(s) IntraMuscular once  insulin lispro (ADMELOG) corrective regimen sliding scale   SubCutaneous Before meals and at bedtime  labetalol Infusion 4 mG/Min (60 mL/Hr) IV Continuous <Continuous>  labetalol Infusion 1 mG/Min (60 mL/Hr) IV Continuous <Continuous>  lactated ringers. 1000 milliLiter(s) (50 mL/Hr) IV Continuous <Continuous>  niCARdipine Infusion 5 mG/Hr (25 mL/Hr) IV Continuous <Continuous>  pantoprazole    Tablet 40 milliGRAM(s) Oral before breakfast  potassium chloride   Powder 40 milliEquivalent(s) Oral once  sodium chloride 0.9% lock flush 3 milliLiter(s) IV Push every 8 hours    MEDICATIONS  (PRN):  acetaminophen     Tablet .. 650 milliGRAM(s) Oral every 6 hours PRN Mild Pain (1 - 3)    Allergies    No Known Allergies    Intolerances      Vital Signs Last 24 Hrs  T(C): 36.8 (23 May 2022 14:25), Max: 37.1 (23 May 2022 00:54)  T(F): 98.2 (23 May 2022 14:25), Max: 98.7 (23 May 2022 00:54)  HR: 56 (23 May 2022 16:00) (56 - 69)  BP: 117/70 (23 May 2022 10:00) (111/66 - 138/80)  BP(mean): 88 (23 May 2022 10:00) (84 - 104)  RR: 14 (23 May 2022 16:00) (14 - 18)  SpO2: 97% (23 May 2022 16:00) (93% - 97%)    Physical exam:  General: No acute distress, awake and alert  Cardiovascular: Regular rate and rhythm, no murmurs, rubs, or gallops. No bruits  Pulmonary: Anterior breath sounds clear bilaterally, no crackles or wheezing. No use of accessory muscles  GI: Abdomen soft, non-tender, non-distended    Neurologic:  -Mental status:     ****INCOMPLETE*****       Awake, alert, oriented to person, place, and time. Speech is fluent with intact naming, repetition, and comprehension, no dysarthria. Recent and remote memory intact. Follows commands. Attention/concentration intact. Fund of knowledge appropriate.  -Cranial nerves:   II: Visual fields are full to confrontation.  III, IV, VI: Extraocular movements are intact without nystagmus. Pupils equally round and reactive to light  V:  Facial sensation V1-V3 equal and intact   VII: Face is symmetric with normal eye closure and smile  VIII: Hearing is bilaterally intact to finger rub  IX, X: Uvula is midline and soft palate rises symmetrically  XI: Head turning and shoulder shrug are intact.  XII: Tongue protrudes midline  Motor: Normal bulk and tone. No pronator drift. Strength bilateral upper extremity 5/5, bilateral lower extremities 5/5.  Rapid alternating movements intact and symmetric  Sensation: Intact to light touch bilaterally. No neglect or extinction on double simultaneous testing.  Coordination: No dysmetria on finger-to-nose and heel-to-shin bilaterally  Reflexes: Downgoing toes bilaterally   Gait: Narrow gait and steady    NIHSS: **** ASPECT Score: *** ICH Score: *** (GCS)    Fingerstick Blood Glucose: CAPILLARY BLOOD GLUCOSE      POCT Blood Glucose.: 124 mg/dL (23 May 2022 12:45)    LABS:                        10.9   6.39  )-----------( 203      ( 23 May 2022 13:49 )             32.0         135  |  102  |  28<H>  ----------------------------<  141<H>  3.6   |  22  |  1.90<H>    Ca    8.4      23 May 2022 13:49  Phos  3.3       Mg     2.1         TPro  7.0  /  Alb  3.2<L>  /  TBili  0.4  /  DBili  x   /  AST  13  /  ALT  37  /  AlkPhos  83      PT/INR - ( 23 May 2022 13:49 )   PT: 14.9 sec;   INR: 1.25          PTT - ( 23 May 2022 13:49 )  PTT:27.3 sec  CARDIAC MARKERS ( 22 May 2022 22:39 )  x     / 0.02 ng/mL / 33 U/L / x     / <1.0 ng/mL      Urinalysis Basic - ( 22 May 2022 23:20 )    Color: Yellow / Appearance: SL Cloudy / S.025 / pH: x  Gluc: x / Ketone: NEGATIVE  / Bili: Negative / Urobili: 0.2 E.U./dL   Blood: x / Protein: Trace mg/dL / Nitrite: NEGATIVE   Leuk Esterase: NEGATIVE / RBC: < 5 /HPF / WBC < 5 /HPF   Sq Epi: x / Non Sq Epi: 0-5 /HPF / Bacteria: Present /HPF        RADIOLOGY & ADDITIONAL STUDIES:    HCT: There is no evidence of acute infarction, intracranial hemorrhage or mass   lesion.    There is hypoattenuation of the subcortical and periventricular white   matter, which is nonspecific finding, but most likely represents sequela   of chronic microvascular ischemic disease. There are chronic lacunar   infarcts in the bilateral basal ganglia, more pronounced on the left side   There are atherosclerotic calcifications of the cavernous internal   carotid arteries bilaterally. There is prominence of the cortical sulci   related to underlying brain parenchymal volume loss.    There is no evidence of hydrocephalus. There are no extra-axial fluid   collections. There is an enlarged, partially empty sella turcica.    The visualized intraorbital contents are normal. The imaged portions of   the paranasal sinuses are aerated. The mastoid air cells are clear. The   visualized soft tissues and osseous structures appear normal.      IMPRESSION:    No acute intracranial findings.    --- End of Report ---        ASSESSMENT/PLAN:    61y Male w/ PMH ***. HCT showed ***. CTA showed ***. Given *** tPA was ***. Patient was admitted to **** for further workup    **Stroke/Neurology Consult***    HPI: 61 year old male with PMHx of HTN, HLD, Current smoker (30 pack year history), admitted to outside hospital with CP on 2022 , cardiac cath shows non-obstructive CAD, EF without valvulopathy and normal EF, upon discharge (2022) patient found to be febrile and underwent infectious work-up. CT chest shows thoracic aortic changes with follow up CTA chest showing chronic thrombosed dissection versus intramural hematoma along the thoracic aorta, small pericardial effusion and left pleural effusion vs hemothorax. Patient transferred to [facility] under the care of Dr. Ford for surgical evaluation.  Upon arrival patient in no acute distress and denies all pain, patient states only has history of HTN but has been off medication for 6 months as he was unable to refill his medications.     Pt seen and examined for baseline neurologic exam prior to aortic dissection repair.  Pt does not want to talk about his medical history or if he has a history of neurologic events.     PAST MEDICAL & SURGICAL HISTORY:  HTN (hypertension)      HLD (hyperlipidemia)      Smoker      No significant past surgical history          FAMILY HISTORY:  No pertinent family history in first degree relatives        SOCIAL HISTORY:   Smoking status: 30 pack years  Drinking: does not participate   Drug Use: does not participate.    ROS: ***  Constitutional: No fever, weight loss or fatigue  Eyes: No eye pain, visual disturbances, or discharge  ENMT:  No difficulty hearing, tinnitus, vertigo; No sinus or throat pain  Neck: No pain or stiffness  Respiratory: No cough, wheezing, chills or hemoptysis  Cardiovascular: No chest pain, palpitations, shortness of breath, dizziness or leg swelling  Gastrointestinal: No abdominal pain. No nausea, vomiting or hematemesis; No diarrhea or constipation. Nohematochezia.  Genitourinary: No dysuria, frequency, hematuria or incontinence  Neurological: As per HPI  Skin: No itching, burning, rashes or lesions   Endocrine: No heat or cold intolerance; No hair loss  Musculoskeletal: No joint pain or swelling; No muscle, back or extremity pain  Psychiatric: No depression, anxiety, mood swings or difficulty sleeping  Heme/Lymph: No easy bruising or bleeding gums    T(C): 36.8 (22 @ 14:25), Max: 37.1 (22 @ 00:54)  HR: 56 (22 @ 16:00) (56 - 69)  BP: 117/70 (22 @ 10:00) (111/66 - 138/80)  RR: 14 (22 @ 16:00) (14 - 18)  SpO2: 97% (22 @ 16:00) (93% - 97%)    MEDICATION RECONCILIATION   MEDICATIONS  (STANDING):  amLODIPine   Tablet 5 milliGRAM(s) Oral daily  atorvastatin 40 milliGRAM(s) Oral at bedtime  chlorhexidine 0.12% Liquid 5 milliLiter(s) Swish and Spit once  chlorhexidine 4% Liquid 1 Application(s) Topical once  chlorhexidine 4% Liquid 1 Application(s) Topical once  dextrose 50% Injectable 25 Gram(s) IV Push once  glucagon  Injectable 1 milliGRAM(s) IntraMuscular once  insulin lispro (ADMELOG) corrective regimen sliding scale   SubCutaneous Before meals and at bedtime  labetalol Infusion 4 mG/Min (60 mL/Hr) IV Continuous <Continuous>  labetalol Infusion 1 mG/Min (60 mL/Hr) IV Continuous <Continuous>  lactated ringers. 1000 milliLiter(s) (50 mL/Hr) IV Continuous <Continuous>  niCARdipine Infusion 5 mG/Hr (25 mL/Hr) IV Continuous <Continuous>  pantoprazole    Tablet 40 milliGRAM(s) Oral before breakfast  potassium chloride   Powder 40 milliEquivalent(s) Oral once  sodium chloride 0.9% lock flush 3 milliLiter(s) IV Push every 8 hours    MEDICATIONS  (PRN):  acetaminophen     Tablet .. 650 milliGRAM(s) Oral every 6 hours PRN Mild Pain (1 - 3)    Allergies    No Known Allergies    Intolerances      Vital Signs Last 24 Hrs  T(C): 36.8 (23 May 2022 14:25), Max: 37.1 (23 May 2022 00:54)  T(F): 98.2 (23 May 2022 14:25), Max: 98.7 (23 May 2022 00:54)  HR: 56 (23 May 2022 16:00) (56 - 69)  BP: 117/70 (23 May 2022 10:00) (111/66 - 138/80)  BP(mean): 88 (23 May 2022 10:00) (84 - 104)  RR: 14 (23 May 2022 16:00) (14 - 18)  SpO2: 97% (23 May 2022 16:00) (93% - 97%)    Physical exam:  General: No acute distress, awake and alert  Cardiovascular: Regular rate and rhythm, no murmurs, rubs, or gallops. No bruits  Pulmonary: Anterior breath sounds clear bilaterally, no crackles or wheezing. No use of accessory muscles  GI: Abdomen soft, non-tender, non-distended    Neurologic:  -Mental status:    Awake, alert, oriented to person only but not to time. Follows one-step commands only. No dysarthria. Does not want to participate in further cognitive testing.   -Cranial nerves:   II: Visual fields are full to confrontation.  III, IV, VI: Extraocular movements are intact without nystagmus. Pupils equally round and reactive to light  V:  Facial sensation V1-V3 equal and intact   VII: Face is symmetric with normal eye closure and smile  Motor: Left arm 4/5, right arm 4-/5, left leg 4-/5 with drift, right leg 4/5  Sensation: Intact to light touch bilaterally. No neglect or extinction on double simultaneous testing.  Coordination: No dysmetria on finger-to-nose  bilaterally    NIHSS: 4     Fingerstick Blood Glucose: CAPILLARY BLOOD GLUCOSE      POCT Blood Glucose.: 124 mg/dL (23 May 2022 12:45)    LABS:                        10.9   6.39  )-----------( 203      ( 23 May 2022 13:49 )             32.0     05-    135  |  102  |  28<H>  ----------------------------<  141<H>  3.6   |  22  |  1.90<H>    Ca    8.4      23 May 2022 13:49  Phos  3.3     05-  Mg     2.1     05-    TPro  7.0  /  Alb  3.2<L>  /  TBili  0.4  /  DBili  x   /  AST  13  /  ALT  37  /  AlkPhos  83  05-23    PT/INR - ( 23 May 2022 13:49 )   PT: 14.9 sec;   INR: 1.25          PTT - ( 23 May 2022 13:49 )  PTT:27.3 sec  CARDIAC MARKERS ( 22 May 2022 22:39 )  x     / 0.02 ng/mL / 33 U/L / x     / <1.0 ng/mL      Urinalysis Basic - ( 22 May 2022 23:20 )    Color: Yellow / Appearance: SL Cloudy / S.025 / pH: x  Gluc: x / Ketone: NEGATIVE  / Bili: Negative / Urobili: 0.2 E.U./dL   Blood: x / Protein: Trace mg/dL / Nitrite: NEGATIVE   Leuk Esterase: NEGATIVE / RBC: < 5 /HPF / WBC < 5 /HPF   Sq Epi: x / Non Sq Epi: 0-5 /HPF / Bacteria: Present /HPF        RADIOLOGY & ADDITIONAL STUDIES:    HCT: There is no evidence of acute infarction, intracranial hemorrhage or mass   lesion.    There is hypoattenuation of the subcortical and periventricular white   matter, which is nonspecific finding, but most likely represents sequela   of chronic microvascular ischemic disease. There are chronic lacunar   infarcts in the bilateral basal ganglia, more pronounced on the left side   There are atherosclerotic calcifications of the cavernous internal   carotid arteries bilaterally. There is prominence of the cortical sulci   related to underlying brain parenchymal volume loss.    There is no evidence of hydrocephalus. There are no extra-axial fluid   collections. There is an enlarged, partially empty sella turcica.    The visualized intraorbital contents are normal. The imaged portions of   the paranasal sinuses are aerated. The mastoid air cells are clear. The   visualized soft tissues and osseous structures appear normal.      IMPRESSION:    No acute intracranial findings.    --- End of Report ---        ASSESSMENT/PLAN:  61 year old male with PMHx of HTN, HLD, Current smoker (30 pack year history), admitted to outside hospital with CP on 2022 , cardiac cath shows non-obstructive CAD, EF without valvulopathy and normal EF, upon discharge (2022) patient found to be febrile and underwent infectious work-up. CT chest shows thoracic aortic changes with follow up CTA chest showing chronic thrombosed dissection versus intramural hematoma along the thoracic aorta, small pericardial effusion and left pleural effusion vs hemothorax. Patient transferred to [facility] under the care of Dr. Ford for surgical evaluation.   Pt seen and examined for baseline neurologic exam prior to aortic dissection repair. He is disoriented, weak throughout, with R arm weaker than left and left leg weaker than right side. Pt mildly tremulous as well.   Discussed with CTICU team.

## 2022-05-23 NOTE — PROGRESS NOTE ADULT - SUBJECTIVE AND OBJECTIVE BOX
CTICU  CRITICAL  CARE  attending     Hand off received 					   Pertinent clinical, laboratory, radiographic, hemodynamic, echocardiographic, respiratory data, microbiologic data and chart were reviewed and analyzed frequently throughout the course of the day and night  Patient seen and examined with CTS/ SH attending at bedside  Pt is a 61y , Male, HEALTH ISSUES - PROBLEM Dx:      , FAMILY HISTORY:  No pertinent family history in first degree relatives    PAST MEDICAL & SURGICAL HISTORY:  HTN (hypertension)      HLD (hyperlipidemia)      Smoker      No significant past surgical history        Patient is a 61y old  Male who presents with a chief complaint of Intramural hematoma (23 May 2022 16:25)      14 system review was unremarkable    Vital signs, hemodynamic and respiratory parameters were reviewed from the bedside nursing flowsheet.  ICU Vital Signs Last 24 Hrs  T(C): 36.9 (23 May 2022 17:14), Max: 37.1 (23 May 2022 00:54)  T(F): 98.4 (23 May 2022 17:14), Max: 98.7 (23 May 2022 00:54)  HR: 57 (23 May 2022 20:00) (56 - 69)  BP: 117/70 (23 May 2022 10:00) (111/66 - 138/80)  BP(mean): 88 (23 May 2022 10:00) (84 - 104)  ABP: 119/62 (23 May 2022 20:00) (109/56 - 169/77)  ABP(mean): 80 (23 May 2022 20:00) (74 - 102)  RR: 18 (23 May 2022 21:00) (14 - 18)  SpO2: 96% (23 May 2022 20:00) (93% - 98%)    Adult Advanced Hemodynamics Last 24 Hrs  CVP(mm Hg): --  CVP(cm H2O): --  CO: --  CI: --  PA: --  PA(mean): --  PCWP: --  SVR: --  SVRI: --  PVR: --  PVRI: --, ABG - ( 23 May 2022 19:10 )  pH, Arterial: 7.45  pH, Blood: x     /  pCO2: 31    /  pO2: 77    / HCO3: 22    / Base Excess: -1.6  /  SaO2: 97.1                Intake and output was reviewed and the fluid balance was calculated  Daily     Daily   I&O's Summary    22 May 2022 07:01  -  23 May 2022 07:00  --------------------------------------------------------  IN: 837.5 mL / OUT: 800 mL / NET: 37.5 mL    23 May 2022 07:01  -  23 May 2022 22:16  --------------------------------------------------------  IN: 3010 mL / OUT: 765 mL / NET: 2245 mL        All lines and drain sites were assessed  [facility]      POCT Blood Glucose.: 161 mg/dL (23 May 2022 21:31)    No acute change in mental status  Auscultation of the chest reveals equal bs  Abdomen is soft  Extremities are warm and well perfused  Wounds appear clean and unremarkable  Antibiotics are periop    labs  CBC Full  -  ( 23 May 2022 19:18 )  WBC Count : 6.36 K/uL  RBC Count : 3.56 M/uL  Hemoglobin : 10.4 g/dL  Hematocrit : 30.6 %  Platelet Count - Automated : 182 K/uL  Mean Cell Volume : 86.0 fl  Mean Cell Hemoglobin : 29.2 pg  Mean Cell Hemoglobin Concentration : 34.0 gm/dL  Auto Neutrophil # : x  Auto Lymphocyte # : x  Auto Monocyte # : x  Auto Eosinophil # : x  Auto Basophil # : x  Auto Neutrophil % : x  Auto Lymphocyte % : x  Auto Monocyte % : x  Auto Eosinophil % : x  Auto Basophil % : x    05-23    133<L>  |  102  |  22  ----------------------------<  208<H>  4.1   |  21<L>  |  2.06<H>    Ca    8.2<L>      23 May 2022 19:18  Phos  3.0     05-23  Mg     2.0     05-23    TPro  6.4  /  Alb  2.9<L>  /  TBili  0.4  /  DBili  x   /  AST  11  /  ALT  34  /  AlkPhos  76  05-23    PT/INR - ( 23 May 2022 19:18 )   PT: 15.6 sec;   INR: 1.31          PTT - ( 23 May 2022 19:18 )  PTT:26.7 sec  The current medications were reviewed   MEDICATIONS  (STANDING):  amLODIPine   Tablet 5 milliGRAM(s) Oral daily  atorvastatin 40 milliGRAM(s) Oral at bedtime  chlorhexidine 0.12% Liquid 5 milliLiter(s) Swish and Spit once  dextrose 50% Injectable 25 Gram(s) IV Push once  esMOLOL  Infusion 50 MICROgram(s)/kG/Min (24.5 mL/Hr) IV Continuous <Continuous>  glucagon  Injectable 1 milliGRAM(s) IntraMuscular once  insulin lispro (ADMELOG) corrective regimen sliding scale   SubCutaneous Before meals and at bedtime  labetalol Infusion 1 mG/Min (60 mL/Hr) IV Continuous <Continuous>  lactated ringers. 1000 milliLiter(s) (50 mL/Hr) IV Continuous <Continuous>  niCARdipine Infusion 5 mG/Hr (25 mL/Hr) IV Continuous <Continuous>  pantoprazole    Tablet 40 milliGRAM(s) Oral before breakfast  sodium chloride 0.9% lock flush 3 milliLiter(s) IV Push every 8 hours    MEDICATIONS  (PRN):  acetaminophen     Tablet .. 650 milliGRAM(s) Oral every 6 hours PRN Mild Pain (1 - 3)       PROBLEM LIST/ ASSESSMENT:  HEALTH ISSUES - PROBLEM Dx:      ,   Patient is a 61y old  Male who presents with a chief complaint of Intramural hematoma (23 May 2022 16:25)    for cardiac surgery for type a intramural hematoma     Toxic metabolic encephalopathy - delirium possibly on underlying dementia         Acute kidney injury - creatinine > 0.3 due to combined prerenal and intrarenal factors can presume ATN    baseline ischemic stroke            My plan includes :    will place britt cath    ct scan   neuro eval    labetalol infusion   close hemodynamic, ventilatory and drain monitoring and management per post op routine    Monitor for arrhythmias and monitor parameters for organ perfusion  beta blockade not administered due to hemodynamic instability and bradycardia  monitor neurologic status  Head of the bed should remain elevated to 45 deg .   chest PT and IS will be encouraged  monitor adequacy of oxygenation and ventilation and attempt to wean oxygen  antibiotic regimen will be tailored to the clinical, laboratory and microbiologic data  Nutritional goals will be met using po eventually , ensure adequate caloric intake and montior the same  Stress ulcer and VTE prophylaxis will be achieved    Glycemic control is satisfactory  Electrolytes have been repleted as necessary and wound care has been carried out. Pain control has been achieved.   agressive physical therapy and early mobility and ambulation goals will be met   The family was updated about the course and plan  CRITICAL CARE TIME personally provided by me  in evaluation and management, reassessments, review and interpretation of labs and x-rays, ventilator and hemodynamic management, formulating a plan and coordinating care: ___90____ MIN.  Time does not include procedural time.  CTICU ATTENDING     					    Rj Prado MD

## 2022-05-24 ENCOUNTER — TRANSCRIPTION ENCOUNTER (OUTPATIENT)
Age: 62
End: 2022-05-24

## 2022-05-24 ENCOUNTER — APPOINTMENT (OUTPATIENT)
Dept: CARDIOTHORACIC SURGERY | Facility: HOSPITAL | Age: 62
End: 2022-05-24

## 2022-05-24 ENCOUNTER — RESULT REVIEW (OUTPATIENT)
Age: 62
End: 2022-05-24

## 2022-05-24 LAB
ALBUMIN SERPL ELPH-MCNC: 3 G/DL — LOW (ref 3.3–5)
ALBUMIN SERPL ELPH-MCNC: 3.3 G/DL — SIGNIFICANT CHANGE UP (ref 3.3–5)
ALBUMIN SERPL ELPH-MCNC: 3.4 G/DL — SIGNIFICANT CHANGE UP (ref 3.3–5)
ALP SERPL-CCNC: 66 U/L — SIGNIFICANT CHANGE UP (ref 40–120)
ALP SERPL-CCNC: 68 U/L — SIGNIFICANT CHANGE UP (ref 40–120)
ALP SERPL-CCNC: 69 U/L — SIGNIFICANT CHANGE UP (ref 40–120)
ALT FLD-CCNC: 24 U/L — SIGNIFICANT CHANGE UP (ref 10–45)
ALT FLD-CCNC: 25 U/L — SIGNIFICANT CHANGE UP (ref 10–45)
ALT FLD-CCNC: 33 U/L — SIGNIFICANT CHANGE UP (ref 10–45)
ANION GAP SERPL CALC-SCNC: 10 MMOL/L — SIGNIFICANT CHANGE UP (ref 5–17)
ANION GAP SERPL CALC-SCNC: 11 MMOL/L — SIGNIFICANT CHANGE UP (ref 5–17)
APTT BLD: 26.4 SEC — LOW (ref 27.5–35.5)
APTT BLD: 28.7 SEC — SIGNIFICANT CHANGE UP (ref 27.5–35.5)
APTT BLD: 31.3 SEC — SIGNIFICANT CHANGE UP (ref 27.5–35.5)
AST SERPL-CCNC: 11 U/L — SIGNIFICANT CHANGE UP (ref 10–40)
AST SERPL-CCNC: 33 U/L — SIGNIFICANT CHANGE UP (ref 10–40)
AST SERPL-CCNC: 33 U/L — SIGNIFICANT CHANGE UP (ref 10–40)
BASE EXCESS BLDV CALC-SCNC: -2.9 MMOL/L — LOW (ref -2–3)
BASE EXCESS BLDV CALC-SCNC: -4.5 MMOL/L — LOW (ref -2–3)
BASOPHILS # BLD AUTO: 0.01 K/UL — SIGNIFICANT CHANGE UP (ref 0–0.2)
BASOPHILS NFR BLD AUTO: 0.1 % — SIGNIFICANT CHANGE UP (ref 0–2)
BILIRUB SERPL-MCNC: 0.6 MG/DL — SIGNIFICANT CHANGE UP (ref 0.2–1.2)
BILIRUB SERPL-MCNC: 0.8 MG/DL — SIGNIFICANT CHANGE UP (ref 0.2–1.2)
BILIRUB SERPL-MCNC: 1 MG/DL — SIGNIFICANT CHANGE UP (ref 0.2–1.2)
BUN SERPL-MCNC: 16 MG/DL — SIGNIFICANT CHANGE UP (ref 7–23)
BUN SERPL-MCNC: 17 MG/DL — SIGNIFICANT CHANGE UP (ref 7–23)
BUN SERPL-MCNC: 23 MG/DL — SIGNIFICANT CHANGE UP (ref 7–23)
CALCIUM SERPL-MCNC: 8.4 MG/DL — SIGNIFICANT CHANGE UP (ref 8.4–10.5)
CALCIUM SERPL-MCNC: 8.4 MG/DL — SIGNIFICANT CHANGE UP (ref 8.4–10.5)
CALCIUM SERPL-MCNC: 8.7 MG/DL — SIGNIFICANT CHANGE UP (ref 8.4–10.5)
CHLORIDE SERPL-SCNC: 103 MMOL/L — SIGNIFICANT CHANGE UP (ref 96–108)
CHLORIDE SERPL-SCNC: 109 MMOL/L — HIGH (ref 96–108)
CHLORIDE SERPL-SCNC: 109 MMOL/L — HIGH (ref 96–108)
CO2 BLDV-SCNC: 21.4 MMOL/L — LOW (ref 22–26)
CO2 BLDV-SCNC: 23.9 MMOL/L — SIGNIFICANT CHANGE UP (ref 22–26)
CO2 SERPL-SCNC: 21 MMOL/L — LOW (ref 22–31)
CO2 SERPL-SCNC: 22 MMOL/L — SIGNIFICANT CHANGE UP (ref 22–31)
CO2 SERPL-SCNC: 23 MMOL/L — SIGNIFICANT CHANGE UP (ref 22–31)
CREAT SERPL-MCNC: 1.76 MG/DL — HIGH (ref 0.5–1.3)
CREAT SERPL-MCNC: 1.82 MG/DL — HIGH (ref 0.5–1.3)
CREAT SERPL-MCNC: 2.02 MG/DL — HIGH (ref 0.5–1.3)
EGFR: 37 ML/MIN/1.73M2 — LOW
EGFR: 42 ML/MIN/1.73M2 — LOW
EGFR: 43 ML/MIN/1.73M2 — LOW
EOSINOPHIL # BLD AUTO: 0 K/UL — SIGNIFICANT CHANGE UP (ref 0–0.5)
EOSINOPHIL NFR BLD AUTO: 0 % — SIGNIFICANT CHANGE UP (ref 0–6)
GAS PNL BLDA: SIGNIFICANT CHANGE UP
GAS PNL BLDV: SIGNIFICANT CHANGE UP
GLUCOSE BLDC GLUCOMTR-MCNC: 125 MG/DL — HIGH (ref 70–99)
GLUCOSE BLDC GLUCOMTR-MCNC: 146 MG/DL — HIGH (ref 70–99)
GLUCOSE BLDC GLUCOMTR-MCNC: 157 MG/DL — HIGH (ref 70–99)
GLUCOSE BLDC GLUCOMTR-MCNC: 167 MG/DL — HIGH (ref 70–99)
GLUCOSE BLDC GLUCOMTR-MCNC: 173 MG/DL — HIGH (ref 70–99)
GLUCOSE BLDC GLUCOMTR-MCNC: 180 MG/DL — HIGH (ref 70–99)
GLUCOSE SERPL-MCNC: 115 MG/DL — HIGH (ref 70–99)
GLUCOSE SERPL-MCNC: 168 MG/DL — HIGH (ref 70–99)
GLUCOSE SERPL-MCNC: 187 MG/DL — HIGH (ref 70–99)
HCO3 BLDV-SCNC: 20 MMOL/L — LOW (ref 22–29)
HCO3 BLDV-SCNC: 23 MMOL/L — SIGNIFICANT CHANGE UP (ref 22–29)
HCT VFR BLD CALC: 25.4 % — LOW (ref 39–50)
HCT VFR BLD CALC: 25.9 % — LOW (ref 39–50)
HCT VFR BLD CALC: 30.1 % — LOW (ref 39–50)
HGB BLD-MCNC: 10.2 G/DL — LOW (ref 13–17)
HGB BLD-MCNC: 8.4 G/DL — LOW (ref 13–17)
HGB BLD-MCNC: 8.8 G/DL — LOW (ref 13–17)
IMM GRANULOCYTES NFR BLD AUTO: 0.9 % — SIGNIFICANT CHANGE UP (ref 0–1.5)
INR BLD: 1.18 — HIGH (ref 0.88–1.16)
INR BLD: 1.22 — HIGH (ref 0.88–1.16)
INR BLD: 1.37 — HIGH (ref 0.88–1.16)
ISTAT ARTERIAL BE: 0 MMOL/L — SIGNIFICANT CHANGE UP (ref -2–3)
ISTAT ARTERIAL GLUCOSE: 179 MG/DL — HIGH (ref 70–99)
ISTAT ARTERIAL HCO3: 25 MMOL/L — SIGNIFICANT CHANGE UP (ref 22–26)
ISTAT ARTERIAL HEMATOCRIT: 27 % — LOW (ref 39–50)
ISTAT ARTERIAL HEMOGLOBIN: 9.2 G/DL — LOW (ref 13–17)
ISTAT ARTERIAL IONIZED CALCIUM: 1.16 MMOL/L — SIGNIFICANT CHANGE UP (ref 1.12–1.3)
ISTAT ARTERIAL PCO2: 43 MMHG — SIGNIFICANT CHANGE UP (ref 35–45)
ISTAT ARTERIAL PH: 7.37 — SIGNIFICANT CHANGE UP (ref 7.35–7.45)
ISTAT ARTERIAL PO2: 205 MMHG — HIGH (ref 80–105)
ISTAT ARTERIAL POTASSIUM: 4.3 MMOL/L — SIGNIFICANT CHANGE UP (ref 3.5–5.3)
ISTAT ARTERIAL SO2: 100 % — HIGH (ref 95–98)
ISTAT ARTERIAL SODIUM: 142 MMOL/L — SIGNIFICANT CHANGE UP (ref 135–145)
ISTAT ARTERIAL TCO2: 26 MMOL/L — SIGNIFICANT CHANGE UP (ref 22–31)
LACTATE SERPL-SCNC: 0.8 MMOL/L — SIGNIFICANT CHANGE UP (ref 0.5–2)
LACTATE SERPL-SCNC: 1.1 MMOL/L — SIGNIFICANT CHANGE UP (ref 0.5–2)
LYMPHOCYTES # BLD AUTO: 0.69 K/UL — LOW (ref 1–3.3)
LYMPHOCYTES # BLD AUTO: 8.7 % — LOW (ref 13–44)
MAGNESIUM SERPL-MCNC: 1.9 MG/DL — SIGNIFICANT CHANGE UP (ref 1.6–2.6)
MAGNESIUM SERPL-MCNC: 3 MG/DL — HIGH (ref 1.6–2.6)
MAGNESIUM SERPL-MCNC: 3.2 MG/DL — HIGH (ref 1.6–2.6)
MCHC RBC-ENTMCNC: 29.2 PG — SIGNIFICANT CHANGE UP (ref 27–34)
MCHC RBC-ENTMCNC: 29.3 PG — SIGNIFICANT CHANGE UP (ref 27–34)
MCHC RBC-ENTMCNC: 30 PG — SIGNIFICANT CHANGE UP (ref 27–34)
MCHC RBC-ENTMCNC: 33.1 GM/DL — SIGNIFICANT CHANGE UP (ref 32–36)
MCHC RBC-ENTMCNC: 33.9 GM/DL — SIGNIFICANT CHANGE UP (ref 32–36)
MCHC RBC-ENTMCNC: 34 GM/DL — SIGNIFICANT CHANGE UP (ref 32–36)
MCV RBC AUTO: 86.5 FL — SIGNIFICANT CHANGE UP (ref 80–100)
MCV RBC AUTO: 88.2 FL — SIGNIFICANT CHANGE UP (ref 80–100)
MCV RBC AUTO: 88.4 FL — SIGNIFICANT CHANGE UP (ref 80–100)
MONOCYTES # BLD AUTO: 0.93 K/UL — HIGH (ref 0–0.9)
MONOCYTES NFR BLD AUTO: 11.7 % — SIGNIFICANT CHANGE UP (ref 2–14)
NEUTROPHILS # BLD AUTO: 6.24 K/UL — SIGNIFICANT CHANGE UP (ref 1.8–7.4)
NEUTROPHILS NFR BLD AUTO: 78.6 % — HIGH (ref 43–77)
NRBC # BLD: 0 /100 WBCS — SIGNIFICANT CHANGE UP (ref 0–0)
PCO2 BLDV: 36 MMHG — LOW (ref 42–55)
PCO2 BLDV: 41 MMHG — LOW (ref 42–55)
PH BLDV: 7.35 — SIGNIFICANT CHANGE UP (ref 7.32–7.43)
PH BLDV: 7.36 — SIGNIFICANT CHANGE UP (ref 7.32–7.43)
PHOSPHATE SERPL-MCNC: 2.7 MG/DL — SIGNIFICANT CHANGE UP (ref 2.5–4.5)
PHOSPHATE SERPL-MCNC: 2.7 MG/DL — SIGNIFICANT CHANGE UP (ref 2.5–4.5)
PHOSPHATE SERPL-MCNC: 3.4 MG/DL — SIGNIFICANT CHANGE UP (ref 2.5–4.5)
PLATELET # BLD AUTO: 156 K/UL — SIGNIFICANT CHANGE UP (ref 150–400)
PLATELET # BLD AUTO: 160 K/UL — SIGNIFICANT CHANGE UP (ref 150–400)
PLATELET # BLD AUTO: 188 K/UL — SIGNIFICANT CHANGE UP (ref 150–400)
PO2 BLDV: 47 MMHG — HIGH (ref 25–45)
PO2 BLDV: 52 MMHG — HIGH (ref 25–45)
POTASSIUM SERPL-MCNC: 3.7 MMOL/L — SIGNIFICANT CHANGE UP (ref 3.5–5.3)
POTASSIUM SERPL-MCNC: 4.3 MMOL/L — SIGNIFICANT CHANGE UP (ref 3.5–5.3)
POTASSIUM SERPL-MCNC: 4.6 MMOL/L — SIGNIFICANT CHANGE UP (ref 3.5–5.3)
POTASSIUM SERPL-SCNC: 3.7 MMOL/L — SIGNIFICANT CHANGE UP (ref 3.5–5.3)
POTASSIUM SERPL-SCNC: 4.3 MMOL/L — SIGNIFICANT CHANGE UP (ref 3.5–5.3)
POTASSIUM SERPL-SCNC: 4.6 MMOL/L — SIGNIFICANT CHANGE UP (ref 3.5–5.3)
PROT SERPL-MCNC: 6.4 G/DL — SIGNIFICANT CHANGE UP (ref 6–8.3)
PROT SERPL-MCNC: 6.4 G/DL — SIGNIFICANT CHANGE UP (ref 6–8.3)
PROT SERPL-MCNC: 6.6 G/DL — SIGNIFICANT CHANGE UP (ref 6–8.3)
PROTHROM AB SERPL-ACNC: 14.1 SEC — HIGH (ref 10.5–13.4)
PROTHROM AB SERPL-ACNC: 14.6 SEC — HIGH (ref 10.5–13.4)
PROTHROM AB SERPL-ACNC: 16.3 SEC — HIGH (ref 10.5–13.4)
RBC # BLD: 2.88 M/UL — LOW (ref 4.2–5.8)
RBC # BLD: 2.93 M/UL — LOW (ref 4.2–5.8)
RBC # BLD: 3.48 M/UL — LOW (ref 4.2–5.8)
RBC # FLD: 13.9 % — SIGNIFICANT CHANGE UP (ref 10.3–14.5)
RBC # FLD: 14.2 % — SIGNIFICANT CHANGE UP (ref 10.3–14.5)
RBC # FLD: 14.4 % — SIGNIFICANT CHANGE UP (ref 10.3–14.5)
SAO2 % BLDV: 80.1 % — SIGNIFICANT CHANGE UP (ref 67–88)
SAO2 % BLDV: 84.9 % — SIGNIFICANT CHANGE UP (ref 67–88)
SODIUM SERPL-SCNC: 135 MMOL/L — SIGNIFICANT CHANGE UP (ref 135–145)
SODIUM SERPL-SCNC: 142 MMOL/L — SIGNIFICANT CHANGE UP (ref 135–145)
SODIUM SERPL-SCNC: 143 MMOL/L — SIGNIFICANT CHANGE UP (ref 135–145)
WBC # BLD: 6.31 K/UL — SIGNIFICANT CHANGE UP (ref 3.8–10.5)
WBC # BLD: 7.22 K/UL — SIGNIFICANT CHANGE UP (ref 3.8–10.5)
WBC # BLD: 7.94 K/UL — SIGNIFICANT CHANGE UP (ref 3.8–10.5)
WBC # FLD AUTO: 6.31 K/UL — SIGNIFICANT CHANGE UP (ref 3.8–10.5)
WBC # FLD AUTO: 7.22 K/UL — SIGNIFICANT CHANGE UP (ref 3.8–10.5)
WBC # FLD AUTO: 7.94 K/UL — SIGNIFICANT CHANGE UP (ref 3.8–10.5)

## 2022-05-24 PROCEDURE — 33866 AORTIC HEMIARCH GRAFT: CPT | Mod: 80

## 2022-05-24 PROCEDURE — 33858 AS-AORT GRF F/AORTIC DSJ: CPT

## 2022-05-24 PROCEDURE — 88305 TISSUE EXAM BY PATHOLOGIST: CPT | Mod: 26

## 2022-05-24 PROCEDURE — 99292 CRITICAL CARE ADDL 30 MIN: CPT

## 2022-05-24 PROCEDURE — 33866 AORTIC HEMIARCH GRAFT: CPT

## 2022-05-24 PROCEDURE — 71045 X-RAY EXAM CHEST 1 VIEW: CPT | Mod: 26

## 2022-05-24 PROCEDURE — 33858 AS-AORT GRF F/AORTIC DSJ: CPT | Mod: 80

## 2022-05-24 PROCEDURE — 88311 DECALCIFY TISSUE: CPT | Mod: 26

## 2022-05-24 PROCEDURE — 33322 REPAIR MAJOR BLOOD VESSEL(S): CPT

## 2022-05-24 PROCEDURE — 93010 ELECTROCARDIOGRAM REPORT: CPT

## 2022-05-24 PROCEDURE — 99291 CRITICAL CARE FIRST HOUR: CPT

## 2022-05-24 PROCEDURE — 33322 REPAIR MAJOR BLOOD VESSEL(S): CPT | Mod: 80

## 2022-05-24 DEVICE — CANNULA CORONARY OSTIAL 14FR X 6" BASKET TIP: Type: IMPLANTABLE DEVICE | Status: FUNCTIONAL

## 2022-05-24 DEVICE — CHEST DRAIN THORACIC PVC 32FR RIGHT ANGLE: Type: IMPLANTABLE DEVICE | Status: FUNCTIONAL

## 2022-05-24 DEVICE — GRAFT VASC GELWEAVE SB THOR 28/8: Type: IMPLANTABLE DEVICE | Status: FUNCTIONAL

## 2022-05-24 DEVICE — SURGICEL FIBRILLAR 4 X 4": Type: IMPLANTABLE DEVICE | Status: FUNCTIONAL

## 2022-05-24 DEVICE — SURGIFLO HEMOSTATIC MATRIX KIT: Type: IMPLANTABLE DEVICE | Status: FUNCTIONAL

## 2022-05-24 DEVICE — CANNULA RETROGRADE CARDIOPLEGIA SELF-INFLATING 14FR PRE-SHAPED STYLET/HANDLE: Type: IMPLANTABLE DEVICE | Status: FUNCTIONAL

## 2022-05-24 DEVICE — LIGATING CLIPS WECK HEMOCLIP TANTALUM LARGE (ORANGE) 10: Type: IMPLANTABLE DEVICE | Status: FUNCTIONAL

## 2022-05-24 DEVICE — CANNULA ARTERIAL OPTISITE 20FR X 3/8" VENTED: Type: IMPLANTABLE DEVICE | Status: FUNCTIONAL

## 2022-05-24 DEVICE — SHEATH INTRODUCER TERUMO PINNACLE PERIPHERAL 4FR X 10CM X 0.035" MINI WIRE: Type: IMPLANTABLE DEVICE | Status: FUNCTIONAL

## 2022-05-24 DEVICE — FELT PTFE 1 X 6": Type: IMPLANTABLE DEVICE | Status: FUNCTIONAL

## 2022-05-24 DEVICE — KIT CVC 3LUM SPECTRUM 9FR: Type: IMPLANTABLE DEVICE | Status: FUNCTIONAL

## 2022-05-24 DEVICE — PACING WIRE STREAMLINE BIPOLAR MYOCARDIAL: Type: IMPLANTABLE DEVICE | Status: FUNCTIONAL

## 2022-05-24 DEVICE — CANNULA VENOUS 2 STAGE MC2 32/40FR X 1/2" OVAL BODY NON-VENTED: Type: IMPLANTABLE DEVICE | Status: FUNCTIONAL

## 2022-05-24 DEVICE — BIOGLUE 10ML SYR: Type: IMPLANTABLE DEVICE | Status: FUNCTIONAL

## 2022-05-24 DEVICE — INTRO MICROPUNC 4FRX10CM SS: Type: IMPLANTABLE DEVICE | Status: FUNCTIONAL

## 2022-05-24 DEVICE — BONE WAX 2.5GM: Type: IMPLANTABLE DEVICE | Status: FUNCTIONAL

## 2022-05-24 DEVICE — KIT CVC 2LUM MAC 9FR CHG: Type: IMPLANTABLE DEVICE | Status: FUNCTIONAL

## 2022-05-24 DEVICE — CANNULA CORONARY SILICONE OSTIAL 17FR: Type: IMPLANTABLE DEVICE | Status: FUNCTIONAL

## 2022-05-24 DEVICE — FELT PTFE 6 X 6": Type: IMPLANTABLE DEVICE | Status: FUNCTIONAL

## 2022-05-24 DEVICE — CANNULA CORONARY SILICONE OSTIAL 15FR: Type: IMPLANTABLE DEVICE | Status: FUNCTIONAL

## 2022-05-24 DEVICE — CATH VENT LEFT HEART SILICONE 16FR NON-VENTED: Type: IMPLANTABLE DEVICE | Status: FUNCTIONAL

## 2022-05-24 DEVICE — CANNULA RCSP 15FR X 12.5" MANUAL-INFLATE CUFF WITH STOPCOCK: Type: IMPLANTABLE DEVICE | Status: FUNCTIONAL

## 2022-05-24 DEVICE — CANNULA AORTIC ROOT 14G X 14CM FLANGED: Type: IMPLANTABLE DEVICE | Status: FUNCTIONAL

## 2022-05-24 DEVICE — INTRODUCER PERCUTANEOUS INSERTION KIT: Type: IMPLANTABLE DEVICE | Status: FUNCTIONAL

## 2022-05-24 RX ORDER — ASPIRIN/CALCIUM CARB/MAGNESIUM 324 MG
81 TABLET ORAL DAILY
Refills: 0 | Status: DISCONTINUED | OUTPATIENT
Start: 2022-05-25 | End: 2022-05-25

## 2022-05-24 RX ORDER — CHLORHEXIDINE GLUCONATE 213 G/1000ML
15 SOLUTION TOPICAL EVERY 12 HOURS
Refills: 0 | Status: DISCONTINUED | OUTPATIENT
Start: 2022-05-24 | End: 2022-05-25

## 2022-05-24 RX ORDER — DEXTROSE 50 % IN WATER 50 %
50 SYRINGE (ML) INTRAVENOUS
Refills: 0 | Status: DISCONTINUED | OUTPATIENT
Start: 2022-05-24 | End: 2022-05-25

## 2022-05-24 RX ORDER — CHLORHEXIDINE GLUCONATE 213 G/1000ML
1 SOLUTION TOPICAL DAILY
Refills: 0 | Status: DISCONTINUED | OUTPATIENT
Start: 2022-05-25 | End: 2022-05-31

## 2022-05-24 RX ORDER — FENTANYL CITRATE 50 UG/ML
25 INJECTION INTRAVENOUS
Refills: 0 | Status: DISCONTINUED | OUTPATIENT
Start: 2022-05-24 | End: 2022-05-25

## 2022-05-24 RX ORDER — VASOPRESSIN 20 [USP'U]/ML
0.05 INJECTION INTRAVENOUS
Qty: 50 | Refills: 0 | Status: DISCONTINUED | OUTPATIENT
Start: 2022-05-24 | End: 2022-05-24

## 2022-05-24 RX ORDER — ALBUMIN HUMAN 25 %
250 VIAL (ML) INTRAVENOUS
Refills: 0 | Status: DISCONTINUED | OUTPATIENT
Start: 2022-05-24 | End: 2022-05-25

## 2022-05-24 RX ORDER — CEFAZOLIN SODIUM 1 G
2000 VIAL (EA) INJECTION EVERY 8 HOURS
Refills: 0 | Status: COMPLETED | OUTPATIENT
Start: 2022-05-24 | End: 2022-05-26

## 2022-05-24 RX ORDER — DEXMEDETOMIDINE HYDROCHLORIDE IN 0.9% SODIUM CHLORIDE 4 UG/ML
0.2 INJECTION INTRAVENOUS
Qty: 400 | Refills: 0 | Status: DISCONTINUED | OUTPATIENT
Start: 2022-05-24 | End: 2022-05-25

## 2022-05-24 RX ORDER — FENTANYL CITRATE 50 UG/ML
12.5 INJECTION INTRAVENOUS ONCE
Refills: 0 | Status: DISCONTINUED | OUTPATIENT
Start: 2022-05-24 | End: 2022-05-24

## 2022-05-24 RX ORDER — MEPERIDINE HYDROCHLORIDE 50 MG/ML
25 INJECTION INTRAMUSCULAR; INTRAVENOUS; SUBCUTANEOUS ONCE
Refills: 0 | Status: DISCONTINUED | OUTPATIENT
Start: 2022-05-24 | End: 2022-05-24

## 2022-05-24 RX ORDER — HEPARIN SODIUM 5000 [USP'U]/ML
5000 INJECTION INTRAVENOUS; SUBCUTANEOUS EVERY 8 HOURS
Refills: 0 | Status: DISCONTINUED | OUTPATIENT
Start: 2022-05-24 | End: 2022-06-06

## 2022-05-24 RX ORDER — POTASSIUM CHLORIDE 20 MEQ
40 PACKET (EA) ORAL ONCE
Refills: 0 | Status: COMPLETED | OUTPATIENT
Start: 2022-05-24 | End: 2022-05-24

## 2022-05-24 RX ORDER — MAGNESIUM OXIDE 400 MG ORAL TABLET 241.3 MG
400 TABLET ORAL ONCE
Refills: 0 | Status: COMPLETED | OUTPATIENT
Start: 2022-05-24 | End: 2022-05-24

## 2022-05-24 RX ORDER — VASOPRESSIN 20 [USP'U]/ML
0.02 INJECTION INTRAVENOUS
Qty: 50 | Refills: 0 | Status: DISCONTINUED | OUTPATIENT
Start: 2022-05-24 | End: 2022-05-25

## 2022-05-24 RX ORDER — MIDAZOLAM HYDROCHLORIDE 1 MG/ML
1 INJECTION, SOLUTION INTRAMUSCULAR; INTRAVENOUS ONCE
Refills: 0 | Status: DISCONTINUED | OUTPATIENT
Start: 2022-05-24 | End: 2022-05-24

## 2022-05-24 RX ORDER — EPINEPHRINE 0.3 MG/.3ML
0.02 INJECTION INTRAMUSCULAR; SUBCUTANEOUS
Qty: 4 | Refills: 0 | Status: DISCONTINUED | OUTPATIENT
Start: 2022-05-24 | End: 2022-05-25

## 2022-05-24 RX ORDER — MILRINONE LACTATE 1 MG/ML
0.12 INJECTION, SOLUTION INTRAVENOUS
Qty: 20 | Refills: 0 | Status: DISCONTINUED | OUTPATIENT
Start: 2022-05-24 | End: 2022-05-28

## 2022-05-24 RX ORDER — DEXTROSE 50 % IN WATER 50 %
25 SYRINGE (ML) INTRAVENOUS
Refills: 0 | Status: DISCONTINUED | OUTPATIENT
Start: 2022-05-24 | End: 2022-05-25

## 2022-05-24 RX ORDER — PANTOPRAZOLE SODIUM 20 MG/1
40 TABLET, DELAYED RELEASE ORAL DAILY
Refills: 0 | Status: DISCONTINUED | OUTPATIENT
Start: 2022-05-24 | End: 2022-05-26

## 2022-05-24 RX ORDER — MIDAZOLAM HYDROCHLORIDE 1 MG/ML
1 INJECTION, SOLUTION INTRAMUSCULAR; INTRAVENOUS
Refills: 0 | Status: DISCONTINUED | OUTPATIENT
Start: 2022-05-24 | End: 2022-05-25

## 2022-05-24 RX ORDER — INSULIN HUMAN 100 [IU]/ML
1 INJECTION, SOLUTION SUBCUTANEOUS
Qty: 50 | Refills: 0 | Status: DISCONTINUED | OUTPATIENT
Start: 2022-05-24 | End: 2022-05-25

## 2022-05-24 RX ORDER — SODIUM CHLORIDE 9 MG/ML
1000 INJECTION INTRAMUSCULAR; INTRAVENOUS; SUBCUTANEOUS
Refills: 0 | Status: DISCONTINUED | OUTPATIENT
Start: 2022-05-24 | End: 2022-05-31

## 2022-05-24 RX ORDER — CHLORHEXIDINE GLUCONATE 213 G/1000ML
5 SOLUTION TOPICAL
Refills: 0 | Status: DISCONTINUED | OUTPATIENT
Start: 2022-05-24 | End: 2022-05-25

## 2022-05-24 RX ORDER — PROPOFOL 10 MG/ML
5 INJECTION, EMULSION INTRAVENOUS
Qty: 1000 | Refills: 0 | Status: DISCONTINUED | OUTPATIENT
Start: 2022-05-24 | End: 2022-05-25

## 2022-05-24 RX ADMIN — MIDAZOLAM HYDROCHLORIDE 1 MILLIGRAM(S): 1 INJECTION, SOLUTION INTRAMUSCULAR; INTRAVENOUS at 00:45

## 2022-05-24 RX ADMIN — PANTOPRAZOLE SODIUM 40 MILLIGRAM(S): 20 TABLET, DELAYED RELEASE ORAL at 18:30

## 2022-05-24 RX ADMIN — Medication 250 MILLILITER(S): at 22:19

## 2022-05-24 RX ADMIN — EPINEPHRINE 6.12 MICROGRAM(S)/KG/MIN: 0.3 INJECTION INTRAMUSCULAR; SUBCUTANEOUS at 18:32

## 2022-05-24 RX ADMIN — FENTANYL CITRATE 12.5 MICROGRAM(S): 50 INJECTION INTRAVENOUS at 01:20

## 2022-05-24 RX ADMIN — MIDAZOLAM HYDROCHLORIDE 1 MILLIGRAM(S): 1 INJECTION, SOLUTION INTRAMUSCULAR; INTRAVENOUS at 23:23

## 2022-05-24 RX ADMIN — SODIUM CHLORIDE 3 MILLILITER(S): 9 INJECTION INTRAMUSCULAR; INTRAVENOUS; SUBCUTANEOUS at 06:16

## 2022-05-24 RX ADMIN — FENTANYL CITRATE 12.5 MICROGRAM(S): 50 INJECTION INTRAVENOUS at 01:16

## 2022-05-24 RX ADMIN — DEXMEDETOMIDINE HYDROCHLORIDE IN 0.9% SODIUM CHLORIDE 4.08 MICROGRAM(S)/KG/HR: 4 INJECTION INTRAVENOUS at 00:22

## 2022-05-24 RX ADMIN — Medication 24.5 MICROGRAM(S)/KG/MIN: at 04:35

## 2022-05-24 RX ADMIN — SODIUM CHLORIDE 10 MILLILITER(S): 9 INJECTION INTRAMUSCULAR; INTRAVENOUS; SUBCUTANEOUS at 18:31

## 2022-05-24 RX ADMIN — CHLORHEXIDINE GLUCONATE 15 MILLILITER(S): 213 SOLUTION TOPICAL at 18:30

## 2022-05-24 RX ADMIN — Medication 250 MILLILITER(S): at 23:21

## 2022-05-24 RX ADMIN — FENTANYL CITRATE 12.5 MICROGRAM(S): 50 INJECTION INTRAVENOUS at 01:00

## 2022-05-24 RX ADMIN — CHLORHEXIDINE GLUCONATE 1 APPLICATION(S): 213 SOLUTION TOPICAL at 06:16

## 2022-05-24 RX ADMIN — MILRINONE LACTATE 3.06 MICROGRAM(S)/KG/MIN: 1 INJECTION, SOLUTION INTRAVENOUS at 19:31

## 2022-05-24 RX ADMIN — Medication 100 MILLIGRAM(S): at 22:46

## 2022-05-24 RX ADMIN — VASOPRESSIN 3 UNIT(S)/MIN: 20 INJECTION INTRAVENOUS at 18:32

## 2022-05-24 RX ADMIN — DEXMEDETOMIDINE HYDROCHLORIDE IN 0.9% SODIUM CHLORIDE 4.08 MICROGRAM(S)/KG/HR: 4 INJECTION INTRAVENOUS at 18:32

## 2022-05-24 RX ADMIN — SODIUM CHLORIDE 3 MILLILITER(S): 9 INJECTION INTRAMUSCULAR; INTRAVENOUS; SUBCUTANEOUS at 21:10

## 2022-05-24 RX ADMIN — FENTANYL CITRATE 12.5 MICROGRAM(S): 50 INJECTION INTRAVENOUS at 00:46

## 2022-05-24 RX ADMIN — FENTANYL CITRATE 25 MICROGRAM(S): 50 INJECTION INTRAVENOUS at 23:23

## 2022-05-24 NOTE — BRIEF OPERATIVE NOTE - NSICDXBRIEFPREOP_GEN_ALL_CORE_FT
PRE-OP DIAGNOSIS:  Aortic dissection 24-May-2022 16:30:29 type A Shena Alexis  Pericardial effusion 24-May-2022 16:31:52  Shena Alexis

## 2022-05-24 NOTE — CHART NOTE - NSCHARTNOTEFT_GEN_A_CORE
Left pigtail and right pleural claudine removed.  No air leaks.  Tie downs and occlusive dressing applied.  CXR ordered.

## 2022-05-24 NOTE — BRIEF OPERATIVE NOTE - NSICDXBRIEFPROCEDURE_GEN_ALL_CORE_FT
PROCEDURES:  Replacement, ascending aorta and hemiarch 24-May-2022 16:28:26 complet reconstruction of aortic root and arch   28mm anteflow graft   Total bypass time: 185 mins  Aortic clamp time:  91 mins   CA: 31 mins (ACP:   RCP:  ) Shena Alexis   PROCEDURES:  Replacement, ascending aorta and hemiarch 24-May-2022 16:28:26 complex reconstruction of aortic root and arch   28mm anteflow graft   Total bypass time: 185 mins  Aortic clamp time:  91 mins   CA: 31 mins (1st RCP: 8mins, ACP: 18mins  2nd RCP: 5mins  ) Shena Alexis   PROCEDURES:  Replacement, ascending aorta and hemiarch 24-May-2022 16:28:26 28mm anteflow graft   Total bypass time: 185 mins  Aortic clamp time:  91 mins   CA: 31 mins (1st RCP: 8mins, ACP: 18mins  2nd RCP: 5mins  ) Shena Alexis  Reconstruction of aortic root 24-May-2022 16:49:44  Shena Alexis

## 2022-05-24 NOTE — PROGRESS NOTE ADULT - SUBJECTIVE AND OBJECTIVE BOX
CTICU  CRITICAL  CARE  attending     Hand off received 					   Pertinent clinical, laboratory, radiographic, hemodynamic, echocardiographic, respiratory data, microbiologic data and chart were reviewed and analyzed frequently throughout the course of the day and night  Patient seen and examined with CTS/ SH attending at bedside  Pt is a 61y , Male, HEALTH ISSUES - PROBLEM Dx:      , FAMILY HISTORY:  No pertinent family history in first degree relatives    PAST MEDICAL & SURGICAL HISTORY:  HTN (hypertension)      HLD (hyperlipidemia)      Smoker      No significant past surgical history        Patient is a 61y old  Male who presents with a chief complaint of Intramural hematoma (23 May 2022 22:16)      14 system review was unremarkable    Vital signs, hemodynamic and respiratory parameters were reviewed from the bedside nursing flowsheet.  ICU Vital Signs Last 24 Hrs  T(C): 34.4 (24 May 2022 20:00), Max: 37 (24 May 2022 05:01)  T(F): 93.9 (24 May 2022 20:00), Max: 98.6 (24 May 2022 05:01)  HR: 79 (24 May 2022 21:00) (55 - 80)  BP: 119/72 (24 May 2022 02:13) (119/72 - 119/72)  BP(mean): --  ABP: 146/72 (24 May 2022 21:00) (106/57 - 146/72)  ABP(mean): 94 (24 May 2022 21:00) (71 - 98)  RR: 16 (24 May 2022 21:00) (14 - 18)  SpO2: 100% (24 May 2022 21:00) (93% - 100%)    Adult Advanced Hemodynamics Last 24 Hrs  CVP(mm Hg): 11 (24 May 2022 21:00) (11 - 20)  CVP(cm H2O): --  CO: 7.9 (24 May 2022 20:00) (7.9 - 8.7)  CI: 3.9 (24 May 2022 20:00) (3.9 - 4.3)  PA: 25/14 (24 May 2022 21:00) (25/14 - 38/23)  PA(mean): 19 (24 May 2022 21:00) (19 - 30)  PCWP: --  SVR: 840 (24 May 2022 20:00) (726 - 840)  SVRI: 1745 (24 May 2022 20:00) (1458 - 1745)  PVR: --  PVRI: --, ABG - ( 24 May 2022 17:59 )  pH, Arterial: 7.33  pH, Blood: x     /  pCO2: 41    /  pO2: 222   / HCO3: 22    / Base Excess: -4.1  /  SaO2: 99.4              Mode: AC/ CMV (Assist Control/ Continuous Mandatory Ventilation)  RR (machine): 14  TV (machine): 500  FiO2: 100  PEEP: 5  ITime: 1  MAP: 8  PIP: 18    Intake and output was reviewed and the fluid balance was calculated  Daily Height in cm: 180.3 (24 May 2022 02:13)    Daily   I&O's Summary    23 May 2022 07:01  -  24 May 2022 07:00  --------------------------------------------------------  IN: 5662.3 mL / OUT: 1700 mL / NET: 3962.3 mL    24 May 2022 07:01  -  24 May 2022 21:16  --------------------------------------------------------  IN: 439.5 mL / OUT: 1880 mL / NET: -1440.5 mL        All lines and drain sites were assessed  [facility]      POCT Blood Glucose.: 180 mg/dL (24 May 2022 19:54)    No acute change in mental status  Auscultation of the chest reveals equal bs  Abdomen is soft  Extremities are warm and well perfused  Wounds appear clean and unremarkable  Antibiotics are periop    labs  CBC Full  -  ( 24 May 2022 18:01 )  WBC Count : 7.94 K/uL  RBC Count : 2.88 M/uL  Hemoglobin : 8.4 g/dL  Hematocrit : 25.4 %  Platelet Count - Automated : 156 K/uL  Mean Cell Volume : 88.2 fl  Mean Cell Hemoglobin : 29.2 pg  Mean Cell Hemoglobin Concentration : 33.1 gm/dL  Auto Neutrophil # : 6.24 K/uL  Auto Lymphocyte # : 0.69 K/uL  Auto Monocyte # : 0.93 K/uL  Auto Eosinophil # : 0.00 K/uL  Auto Basophil # : 0.01 K/uL  Auto Neutrophil % : 78.6 %  Auto Lymphocyte % : 8.7 %  Auto Monocyte % : 11.7 %  Auto Eosinophil % : 0.0 %  Auto Basophil % : 0.1 %    05-24    143  |  109<H>  |  17  ----------------------------<  187<H>  4.6   |  22  |  1.76<H>    Ca    8.7      24 May 2022 18:01  Phos  3.4     05-24  Mg     3.2     05-24    TPro  6.4  /  Alb  3.4  /  TBili  1.0  /  DBili  x   /  AST  33  /  ALT  25  /  AlkPhos  68  05-24    PT/INR - ( 24 May 2022 18:01 )   PT: 14.1 sec;   INR: 1.18          PTT - ( 24 May 2022 18:01 )  PTT:28.7 sec  The current medications were reviewed   MEDICATIONS  (STANDING):  ceFAZolin   IVPB 2000 milliGRAM(s) IV Intermittent every 8 hours  chlorhexidine 0.12% Liquid 5 milliLiter(s) Oral Mucosa two times a day  chlorhexidine 0.12% Liquid 15 milliLiter(s) Oral Mucosa every 12 hours  dexMEDEtomidine Infusion 0.2 MICROgram(s)/kG/Hr (4.08 mL/Hr) IV Continuous <Continuous>  dextrose 50% Injectable 50 milliLiter(s) IV Push every 15 minutes  dextrose 50% Injectable 25 milliLiter(s) IV Push every 15 minutes  EPINEPHrine    Infusion 0.02 MICROgram(s)/kG/Min (6.12 mL/Hr) IV Continuous <Continuous>  heparin   Injectable 5000 Unit(s) SubCutaneous every 8 hours  insulin regular Infusion 1 Unit(s)/Hr (1 mL/Hr) IV Continuous <Continuous>  lactated ringers. 1000 milliLiter(s) (50 mL/Hr) IV Continuous <Continuous>  milrinone Infusion 0.125 MICROgram(s)/kG/Min (3.06 mL/Hr) IV Continuous <Continuous>  pantoprazole  Injectable 40 milliGRAM(s) IV Push daily  propofol Infusion 5 MICROgram(s)/kG/Min (2.45 mL/Hr) IV Continuous <Continuous>  sodium chloride 0.9% lock flush 3 milliLiter(s) IV Push every 8 hours  sodium chloride 0.9%. 1000 milliLiter(s) (10 mL/Hr) IV Continuous <Continuous>  vasopressin Infusion 0.017 Unit(s)/Min (1 mL/Hr) IV Continuous <Continuous>    MEDICATIONS  (PRN):  acetaminophen     Tablet .. 650 milliGRAM(s) Oral every 6 hours PRN Mild Pain (1 - 3)       PROBLEM LIST/ ASSESSMENT:  HEALTH ISSUES - PROBLEM Dx:      ,   Patient is a 61y old  Male who presents with a chief complaint of Intramural hematoma (23 May 2022 22:16)    s/p cardiac surgery    Acute systolic and diastolic heart failure evidenced by SOB and parenchymal infiltrates; will treat with diuresis    Cardiogenic shock on ionotropy    Vasogenic shock due to hypotension in the cticu , will keep on pressors    Hypovolemic shock - > 20% intravascular depletion will replete volume              My plan includes :  close hemodynamic, ventilatory and drain monitoring and management per post op routine    Monitor for arrhythmias and monitor parameters for organ perfusion  beta blockade not administered due to hemodynamic instability and bradycardia  monitor neurologic status  Head of the bed should remain elevated to 45 deg .   chest PT and IS will be encouraged  monitor adequacy of oxygenation and ventilation and attempt to wean oxygen  antibiotic regimen will be tailored to the clinical, laboratory and microbiologic data  Nutritional goals will be met using po eventually , ensure adequate caloric intake and montior the same  Stress ulcer and VTE prophylaxis will be achieved    Glycemic control is satisfactory  Electrolytes have been repleted as necessary and wound care has been carried out. Pain control has been achieved.   agressive physical therapy and early mobility and ambulation goals will be met   The family was updated about the course and plan  CRITICAL CARE TIME personally provided by me  in evaluation and management, reassessments, review and interpretation of labs and x-rays, ventilator and hemodynamic management, formulating a plan and coordinating care: ___90____ MIN.  Time does not include procedural time.  CTICU ATTENDING     					    Rj Prado MD

## 2022-05-24 NOTE — PRE-OP CHECKLIST - SELECT TESTS ORDERED
CBC/CMP/PT/PTT/INR/Type and Cross/Type and Screen/EKG/CXR/POCT Blood Glucose 125/CBC/CMP/PT/PTT/INR/Type and Cross/Type and Screen/EKG/CXR/POCT Blood Glucose

## 2022-05-24 NOTE — BRIEF OPERATIVE NOTE - OPERATION/FINDINGS
Subacute and acute type A aortic dissection with intramural hematoma extended from aortic root into innominate artery and descending aorta.  Hemo-pericardial effusion is appreciated.  EF 55%

## 2022-05-24 NOTE — PROGRESS NOTE ADULT - SUBJECTIVE AND OBJECTIVE BOX
CTICU  CRITICAL  CARE  attending     Hand off received 					   Pertinent clinical, laboratory, radiographic, hemodynamic, echocardiographic, respiratory data, microbiologic data and chart were reviewed and analyzed frequently throughout the course of the day and night  Patient seen and examined with CTS/ SH attending at bedside    Pt is a 61y , Male, operative day s/p acute type A dissection repair with:    replacement of ascending aorta and indra arch  repair of aortic root    Critical care encounter and services provided between 7.30p and 11.59p    PM rounds with CT surgeon/intensivists/ACPs/multi disciplinary team  All labs/imaging/med orders reviewed and appropriate changes made    Vent settings reviewed and changes made    Issues for overnight:    Should remain on full vent support overnight  possible early am weaning and extubation; depending on clinical course  sedation while on vent support    Inotrope support with milrinone  Titrate to maintain CI > 2.2/MVO2 >65%    Acute post H'gic anemia  would hold off transfusion for now unless pt becomes hemodynamically unstable      , FAMILY HISTORY:  No pertinent family history in first degree relatives    PAST MEDICAL & SURGICAL HISTORY:  HTN (hypertension)      HLD (hyperlipidemia)      Smoker      No significant past surgical history        Patient is a 61y old  Male who presents with a chief complaint of Intramural hematoma /acyte type A dissection(24 May 2022 21:15)      14 system review unable to assess    Vital signs, hemodynamic and respiratory parameters were reviewed from the bedside nursing flowsheet.  ICU Vital Signs Last 24 Hrs  T(C): 36.6 (25 May 2022 01:01), Max: 37 (24 May 2022 05:01)  T(F): 97.9 (25 May 2022 01:01), Max: 98.6 (24 May 2022 05:01)  HR: 79 (25 May 2022 01:00) (55 - 80)  BP: 119/72 (24 May 2022 02:13) (119/72 - 119/72)  BP(mean): --  ABP: 137/67 (25 May 2022 01:00) (106/57 - 146/72)  ABP(mean): 87 (25 May 2022 01:00) (71 - 98)  RR: 16 (25 May 2022 01:00) (14 - 18)  SpO2: 100% (25 May 2022 01:00) (95% - 100%)    Adult Advanced Hemodynamics Last 24 Hrs  CVP(mm Hg): 12 (25 May 2022 01:00) (8 - 20)  CVP(cm H2O): --  CO: 8 (25 May 2022 01:00) (7.1 - 8.7)  CI: 3.8 (25 May 2022 01:00) (3.4 - 4.3)  PA: 24/12 (25 May 2022 01:00) (21/10 - 38/23)  PA(mean): 18 (25 May 2022 01:00) (15 - 30)  PCWP: --  SVR: 679 (25 May 2022 01:00) (543 - 840)  SVRI: 1412 (25 May 2022 01:00) (1128 - 1745)  PVR: --  PVRI: --, ABG - ( 24 May 2022 21:29 )  pH, Arterial: 7.40  pH, Blood: x     /  pCO2: 38    /  pO2: 113   / HCO3: 24    / Base Excess: -1.1  /  SaO2: 99.6              Mode: AC/ CMV (Assist Control/ Continuous Mandatory Ventilation)  RR (machine): 16  TV (machine): 500  FiO2: 40  PEEP: 5  ITime: 1  MAP: 9  PIP: 20    Intake and output was reviewed and the fluid balance was calculated  Daily Height in cm: 180.3 (24 May 2022 02:13)    Daily   I&O's Summary    23 May 2022 07:01  -  24 May 2022 07:00  --------------------------------------------------------  IN: 5662.3 mL / OUT: 1700 mL / NET: 3962.3 mL    24 May 2022 07:01  -  25 May 2022 02:07  --------------------------------------------------------  IN: 1224.8 mL / OUT: 2445 mL / NET: -1220.2 mL        All lines and drain sites were assessed  [facility]      POCT Blood Glucose.: 125 mg/dL (25 May 2022 02:00)    No acute change in mental status  Auscultation of the chest reveals equal bs  Abdomen is soft  Extremities are warm and well perfused  Wounds appear clean and unremarkable  Antibiotics are periop    labs  CBC Full  -  ( 24 May 2022 21:33 )  WBC Count : 6.31 K/uL  RBC Count : 2.93 M/uL  Hemoglobin : 8.8 g/dL  Hematocrit : 25.9 %  Platelet Count - Automated : 160 K/uL  Mean Cell Volume : 88.4 fl  Mean Cell Hemoglobin : 30.0 pg  Mean Cell Hemoglobin Concentration : 34.0 gm/dL  Auto Neutrophil # : x  Auto Lymphocyte # : x  Auto Monocyte # : x  Auto Eosinophil # : x  Auto Basophil # : x  Auto Neutrophil % : x  Auto Lymphocyte % : x  Auto Monocyte % : x  Auto Eosinophil % : x  Auto Basophil % : x    05-24    142  |  109<H>  |  16  ----------------------------<  168<H>  4.3   |  23  |  1.82<H>    Ca    8.4      24 May 2022 21:33  Phos  2.7     05-24  Mg     3.0     05-24    TPro  6.4  /  Alb  3.3  /  TBili  0.8  /  DBili  x   /  AST  33  /  ALT  24  /  AlkPhos  66  05-24    PT/INR - ( 24 May 2022 21:33 )   PT: 14.6 sec;   INR: 1.22          PTT - ( 24 May 2022 21:33 )  PTT:31.3 sec  The current medications were reviewed   MEDICATIONS  (STANDING):  albumin human  5% IVPB 250 milliLiter(s) IV Intermittent every 1 hour  aspirin enteric coated 81 milliGRAM(s) Oral daily  ceFAZolin   IVPB 2000 milliGRAM(s) IV Intermittent every 8 hours  chlorhexidine 0.12% Liquid 5 milliLiter(s) Oral Mucosa two times a day  chlorhexidine 0.12% Liquid 15 milliLiter(s) Oral Mucosa every 12 hours  chlorhexidine 2% Cloths 1 Application(s) Topical daily  dexMEDEtomidine Infusion 0.2 MICROgram(s)/kG/Hr (4.08 mL/Hr) IV Continuous <Continuous>  dextrose 50% Injectable 50 milliLiter(s) IV Push every 15 minutes  dextrose 50% Injectable 25 milliLiter(s) IV Push every 15 minutes  EPINEPHrine    Infusion 0.02 MICROgram(s)/kG/Min (6.12 mL/Hr) IV Continuous <Continuous>  heparin   Injectable 5000 Unit(s) SubCutaneous every 8 hours  insulin regular Infusion 1 Unit(s)/Hr (1 mL/Hr) IV Continuous <Continuous>  lactated ringers. 1000 milliLiter(s) (50 mL/Hr) IV Continuous <Continuous>  milrinone Infusion 0.125 MICROgram(s)/kG/Min (3.06 mL/Hr) IV Continuous <Continuous>  pantoprazole  Injectable 40 milliGRAM(s) IV Push daily  propofol Infusion 5 MICROgram(s)/kG/Min (2.45 mL/Hr) IV Continuous <Continuous>  sodium chloride 0.9% lock flush 3 milliLiter(s) IV Push every 8 hours  sodium chloride 0.9%. 1000 milliLiter(s) (10 mL/Hr) IV Continuous <Continuous>  vasopressin Infusion 0.017 Unit(s)/Min (1 mL/Hr) IV Continuous <Continuous>    MEDICATIONS  (PRN):  acetaminophen     Tablet .. 650 milliGRAM(s) Oral every 6 hours PRN Mild Pain (1 - 3)  fentaNYL    Injectable 25 MICROGram(s) IV Push every 3 hours PRN Severe Pain (7 - 10)  midazolam Injectable 1 milliGRAM(s) IV Push every 3 hours PRN For sedation       PROBLEM LIST/ ASSESSMENT:  HEALTH ISSUES - PROBLEM Dx:      ,   Patient is a 61y old  Male who presents with a chief complaint of Intramural hematoma/acute Type A dissection  (24 May 2022 21:15)     s/p replacement of ascending aorta and indra arch  repair of aortic root        My plan includes :    Please see above:      close hemodynamic, ventilatory and drain monitoring and management per post op routine    Monitor for arrhythmias and monitor parameters for organ perfusion  monitor neurologic status  Head of the bed should remain elevated to 45 deg .   chest PT and IS will be encouraged  monitor adequacy of oxygenation and ventilation and attempt to wean oxygen  Nutritional goals will be met using po eventually , ensure adequate caloric intake and montior the same  Stress ulcer and VTE prophylaxis will be achieved    Glycemic control is satisfactory  Electrolytes have been repleted as necessary and wound care has been carried out. Pain control has been achieved.   agressive physical therapy and early mobility and ambulation goals will be met   The family was updated about the course and plan  CRITICAL CARE TIME SPENT in evaluation and management, reassessments, review and interpretation of labs and x-rays, ventilator and hemodynamic management, formulating a plan and coordinating care: ___60__ MIN.  Time does not include procedural time.  CTICU ATTENDING     					    Sharif Rodriguez MD

## 2022-05-24 NOTE — BRIEF OPERATIVE NOTE - COMMENTS
_Lul_ was the first assistant for this case including but not limited to sternotomy, cannulation, ascending and hemiarch replacement, root reconstruction, decannulation and chest closure   I was present for this procedure and participated as first assistant as described by the PA above, unless otherwise noted below.

## 2022-05-24 NOTE — BRIEF OPERATIVE NOTE - NSICDXBRIEFPOSTOP_GEN_ALL_CORE_FT
POST-OP DIAGNOSIS:  Ascending aortic dissection 24-May-2022 16:30:56  Shena Alexis  Hemorrhagic pericardial effusion 24-May-2022 16:31:32  Shena Alexis

## 2022-05-24 NOTE — BRIEF OPERATIVE NOTE - ESTIMATED BLOOD LOSS
Calorie Counting Diet   WHAT YOU NEED TO KNOW:   What is a calorie counting diet? It is a meal plan based on counting calories each day to reach a healthy body weight  You will need to eat fewer calories if you are trying to lose weight  Weight loss may decrease your risk for certain health problems or improve your health if you have health problems  Some of these health problems include heart disease, high blood pressure, and diabetes  What foods should I avoid? Your dietitian will tell you if you need to avoid certain foods based on your body weight and health condition  You may need to avoid high-fat foods if you are at risk for or have heart disease  You may need to eat fewer foods from the breads and starches food group if you have diabetes  How many calories are in foods? The following is a list of foods and drinks with the approximate number of calories in each  Check the food label to find the exact number of calories  A dietitian can tell you how many calories you should have from each food group each day    · Carbohydrate:      ¨ ½ of a 3-inch bagel, 1 slice of bread, or ½ of a hamburger bun or hot dog bun (80)    ¨ 1 (8-inch) flour tortilla or ½ cup of cooked rice (100)    ¨ 1 (6-inch) corn tortilla (80)    ¨ 1 (6-inch) pancake or 1 cup of bran flakes cereal (110)    ¨ ½ cup of cooked cereal (80)    ¨ ½ cup of cooked pasta (85)    ¨ 1 ounce of pretzels (100)    ¨ 3 cups of air-popped popcorn without butter or oil (80)    · Dairy:      ¨ 1 cup of skim or 1% milk (90)    ¨ 1 cup of 2% milk (120)    ¨ 1 cup of whole milk (160)    ¨ 1 cup of 2% chocolate milk (220)    ¨ 1 ounce of low-fat cheese with 3 grams of fat per ounce (70)    ¨ 1 ounce of cheddar cheese (114)    ¨ ½ cup of 1% fat cottage cheese (80)    ¨ 1 cup of plain or sugar-free, fat-free yogurt (90)    · Protein foods:      ¨ 3 ounces of fish (not breaded or fried) (95)    ¨ 3 ounces of breaded, fried fish (195)    ¨ ¾ cup of tuna canned in water (105)    ¨ 3 ounces of chicken breast without skin (105)    ¨ 1 fried chicken breast with skin (350)    ¨ ¼ cup of fat free egg substitute (40)    ¨ 1 large egg (75)    ¨ 3 ounces of lean beef or pork (165)    ¨ 3 ounces of fried pork chop or ham (185)    ¨ ½ cup of cooked dried beans, such as kidney, john, lentils, or navy (115)    ¨ 3 ounces of bologna or lunch meat (225)    ¨ 2 links of breakfast sausage (140)    · Vegetables:      ¨ ½ cup of sliced mushrooms (10)    ¨ 1 cup of salad greens, such as lettuce, spinach, or kenroy (15)    ¨ ½ cup of steamed asparagus (20)    ¨ ½ cup of cooked summer squash, zucchini squash, or green or wax beans (25)    ¨ 1 cup of broccoli or cauliflower florets, or 1 medium tomato (25)    ¨ 1 large raw carrot or ½ cup of cooked carrots (40)    ¨ ? of a medium cucumber or 1 stalk of celery (5)    ¨ 1 small baked potato (160)    ¨ 1 cup of breaded, fried vegetables (230)    · Fruit:      ¨ 1 (6-inch) banana (55)     ¨ ½ of a 4-inch grapefruit (55)    ¨ 15 grapes (60)    ¨ 1 medium orange or apple (70)    ¨ 1 large peach (65)    ¨ 1 cup of fresh pineapple chunks (75)    ¨ 1 cup of melon cubes (50)    ¨ 1¼ cups of whole strawberries (45)    ¨ ½ cup of fruit canned in juice (55)    ¨ ½ cup of fruit canned in heavy syrup (110)    ¨ ?  cup of raisins (130)    ¨ ½ cup of unsweetened fruit juice (60)    ¨ ½ cup of grape, cranberry, or prune juice (90)    · Fat:      ¨ 10 peanuts or 2 teaspoons of peanut butter (55)    ¨ 2 tablespoons of avocado or 1 tablespoon of regular salad dressing (45)    ¨ 2 slices of benoit (90)    ¨ 1 teaspoon of oil, such as safflower, canola, corn, or olive oil (45)    ¨ 2 teaspoons of low-fat margarine, or 1 tablespoon of low-fat mayonnaise (50)    ¨ 1 teaspoon of regular margarine (40)    ¨ 1 tablespoon of regular mayonnaise (135)    ¨ 1 tablespoon of cream cheese or 2 tablespoons of low-fat cream cheese (45)    ¨ 2 tablespoons of vegetable shortening (215)    · Dessert and sweets:      ¨ 8 animal crackers or 5 vanilla wafers (80)    ¨ 1 frozen fruit juice bar (80)    ¨ ½ cup of ice milk or low-fat frozen yogurt (90)    ¨ ½ cup of sherbet or sorbet (125)    ¨ ½ cup of sugar-free pudding or custard (60)    ¨ ½ cup of ice cream (140)    ¨ ½ cup of pudding or custard (175)    ¨ 1 (2-inch) square chocolate brownie (185)    · Combination foods:      ¨ Bean burrito made with an 8-inch tortilla, without cheese (275)    ¨ Chicken breast sandwich with lettuce and tomato (325)    ¨ 1 cup of chicken noodle soup (60)    ¨ 1 beef taco (175)    ¨ Regular hamburger with lettuce and tomato (310)    ¨ Regular cheeseburger with lettuce and tomato (410)     ¨ ¼ of a 12-inch cheese pizza (280)    ¨ Fried fish sandwich with lettuce and tomato (425)    ¨ Hot dog and bun (275)    ¨ 1½ cups of macaroni and cheese (310)    ¨ Taco salad with a fried tortilla shell (870)    · Low-calorie foods:      ¨ 1 tablespoon of ketchup or 1 tablespoon of fat free sour cream (15)    ¨ 1 teaspoon of mustard (5)    ¨ ¼ cup of salsa (20)    ¨ 1 large dill pickle (15)    ¨ 1 tablespoon of fat free salad dressing (10)    ¨ 2 teaspoons of low-sugar, light jam or jelly, or 1 tablespoon of sugar-free syrup (15)    ¨ 1 sugar-free popsicle (15)    ¨ 1 cup of club soda, seltzer water, or diet soda (0)  CARE AGREEMENT:   You have the right to help plan your care  Discuss treatment options with your caregivers to decide what care you want to receive  You always have the right to refuse treatment  The above information is an  only  It is not intended as medical advice for individual conditions or treatments  Talk to your doctor, nurse or pharmacist before following any medical regimen to see if it is safe and effective for you  © 2017 [employer] Information is for End User's use only and may not be sold, redistributed or otherwise used for commercial purposes   All illustrations and 0 images included in CareNotes® are the copyrighted property of [employer]  or [employer]i Fel[employer]

## 2022-05-25 LAB
ALBUMIN SERPL ELPH-MCNC: 3.4 G/DL — SIGNIFICANT CHANGE UP (ref 3.3–5)
ALBUMIN SERPL ELPH-MCNC: 3.8 G/DL — SIGNIFICANT CHANGE UP (ref 3.3–5)
ALBUMIN SERPL ELPH-MCNC: 4 G/DL — SIGNIFICANT CHANGE UP (ref 3.3–5)
ALP SERPL-CCNC: 61 U/L — SIGNIFICANT CHANGE UP (ref 40–120)
ALP SERPL-CCNC: 62 U/L — SIGNIFICANT CHANGE UP (ref 40–120)
ALP SERPL-CCNC: 67 U/L — SIGNIFICANT CHANGE UP (ref 40–120)
ALT FLD-CCNC: 17 U/L — SIGNIFICANT CHANGE UP (ref 10–45)
ALT FLD-CCNC: 19 U/L — SIGNIFICANT CHANGE UP (ref 10–45)
ALT FLD-CCNC: 21 U/L — SIGNIFICANT CHANGE UP (ref 10–45)
ANION GAP SERPL CALC-SCNC: 11 MMOL/L — SIGNIFICANT CHANGE UP (ref 5–17)
ANION GAP SERPL CALC-SCNC: 12 MMOL/L — SIGNIFICANT CHANGE UP (ref 5–17)
ANION GAP SERPL CALC-SCNC: 13 MMOL/L — SIGNIFICANT CHANGE UP (ref 5–17)
APTT BLD: 25.6 SEC — LOW (ref 27.5–35.5)
APTT BLD: 26.7 SEC — LOW (ref 27.5–35.5)
APTT BLD: 27.9 SEC — SIGNIFICANT CHANGE UP (ref 27.5–35.5)
AST SERPL-CCNC: 32 U/L — SIGNIFICANT CHANGE UP (ref 10–40)
AST SERPL-CCNC: 34 U/L — SIGNIFICANT CHANGE UP (ref 10–40)
AST SERPL-CCNC: 34 U/L — SIGNIFICANT CHANGE UP (ref 10–40)
BASE EXCESS BLDV CALC-SCNC: -0.2 MMOL/L — SIGNIFICANT CHANGE UP (ref -2–3)
BASE EXCESS BLDV CALC-SCNC: -3.4 MMOL/L — LOW (ref -2–3)
BILIRUB SERPL-MCNC: 0.6 MG/DL — SIGNIFICANT CHANGE UP (ref 0.2–1.2)
BILIRUB SERPL-MCNC: 0.6 MG/DL — SIGNIFICANT CHANGE UP (ref 0.2–1.2)
BILIRUB SERPL-MCNC: 0.7 MG/DL — SIGNIFICANT CHANGE UP (ref 0.2–1.2)
BUN SERPL-MCNC: 19 MG/DL — SIGNIFICANT CHANGE UP (ref 7–23)
BUN SERPL-MCNC: 20 MG/DL — SIGNIFICANT CHANGE UP (ref 7–23)
BUN SERPL-MCNC: 22 MG/DL — SIGNIFICANT CHANGE UP (ref 7–23)
CA-I SERPL-SCNC: 1.08 MMOL/L — LOW (ref 1.15–1.33)
CALCIUM SERPL-MCNC: 8.4 MG/DL — SIGNIFICANT CHANGE UP (ref 8.4–10.5)
CALCIUM SERPL-MCNC: 8.5 MG/DL — SIGNIFICANT CHANGE UP (ref 8.4–10.5)
CALCIUM SERPL-MCNC: 8.5 MG/DL — SIGNIFICANT CHANGE UP (ref 8.4–10.5)
CHLORIDE SERPL-SCNC: 109 MMOL/L — HIGH (ref 96–108)
CHLORIDE SERPL-SCNC: 109 MMOL/L — HIGH (ref 96–108)
CHLORIDE SERPL-SCNC: 111 MMOL/L — HIGH (ref 96–108)
CO2 BLDV-SCNC: 21.3 MMOL/L — LOW (ref 22–26)
CO2 BLDV-SCNC: 25.1 MMOL/L — SIGNIFICANT CHANGE UP (ref 22–26)
CO2 SERPL-SCNC: 20 MMOL/L — LOW (ref 22–31)
CO2 SERPL-SCNC: 22 MMOL/L — SIGNIFICANT CHANGE UP (ref 22–31)
CO2 SERPL-SCNC: 23 MMOL/L — SIGNIFICANT CHANGE UP (ref 22–31)
CREAT SERPL-MCNC: 1.85 MG/DL — HIGH (ref 0.5–1.3)
CREAT SERPL-MCNC: 2.16 MG/DL — HIGH (ref 0.5–1.3)
CREAT SERPL-MCNC: 2.25 MG/DL — HIGH (ref 0.5–1.3)
DRUG SCREEN, SERUM: SIGNIFICANT CHANGE UP
EGFR: 32 ML/MIN/1.73M2 — LOW
EGFR: 34 ML/MIN/1.73M2 — LOW
EGFR: 41 ML/MIN/1.73M2 — LOW
GAS PNL BLDA: SIGNIFICANT CHANGE UP
GAS PNL BLDV: 140 MMOL/L — SIGNIFICANT CHANGE UP (ref 136–145)
GAS PNL BLDV: SIGNIFICANT CHANGE UP
GAS PNL BLDV: SIGNIFICANT CHANGE UP
GLUCOSE BLDC GLUCOMTR-MCNC: 101 MG/DL — HIGH (ref 70–99)
GLUCOSE BLDC GLUCOMTR-MCNC: 102 MG/DL — HIGH (ref 70–99)
GLUCOSE BLDC GLUCOMTR-MCNC: 108 MG/DL — HIGH (ref 70–99)
GLUCOSE BLDC GLUCOMTR-MCNC: 111 MG/DL — HIGH (ref 70–99)
GLUCOSE BLDC GLUCOMTR-MCNC: 113 MG/DL — HIGH (ref 70–99)
GLUCOSE BLDC GLUCOMTR-MCNC: 113 MG/DL — HIGH (ref 70–99)
GLUCOSE BLDC GLUCOMTR-MCNC: 114 MG/DL — HIGH (ref 70–99)
GLUCOSE BLDC GLUCOMTR-MCNC: 115 MG/DL — HIGH (ref 70–99)
GLUCOSE BLDC GLUCOMTR-MCNC: 115 MG/DL — HIGH (ref 70–99)
GLUCOSE BLDC GLUCOMTR-MCNC: 116 MG/DL — HIGH (ref 70–99)
GLUCOSE BLDC GLUCOMTR-MCNC: 116 MG/DL — HIGH (ref 70–99)
GLUCOSE BLDC GLUCOMTR-MCNC: 118 MG/DL — HIGH (ref 70–99)
GLUCOSE BLDC GLUCOMTR-MCNC: 119 MG/DL — HIGH (ref 70–99)
GLUCOSE BLDC GLUCOMTR-MCNC: 124 MG/DL — HIGH (ref 70–99)
GLUCOSE BLDC GLUCOMTR-MCNC: 125 MG/DL — HIGH (ref 70–99)
GLUCOSE BLDC GLUCOMTR-MCNC: 130 MG/DL — HIGH (ref 70–99)
GLUCOSE BLDC GLUCOMTR-MCNC: 134 MG/DL — HIGH (ref 70–99)
GLUCOSE BLDC GLUCOMTR-MCNC: 140 MG/DL — HIGH (ref 70–99)
GLUCOSE BLDC GLUCOMTR-MCNC: 144 MG/DL — HIGH (ref 70–99)
GLUCOSE BLDC GLUCOMTR-MCNC: 92 MG/DL — SIGNIFICANT CHANGE UP (ref 70–99)
GLUCOSE BLDC GLUCOMTR-MCNC: 97 MG/DL — SIGNIFICANT CHANGE UP (ref 70–99)
GLUCOSE BLDC GLUCOMTR-MCNC: 98 MG/DL — SIGNIFICANT CHANGE UP (ref 70–99)
GLUCOSE SERPL-MCNC: 114 MG/DL — HIGH (ref 70–99)
GLUCOSE SERPL-MCNC: 123 MG/DL — HIGH (ref 70–99)
GLUCOSE SERPL-MCNC: 134 MG/DL — HIGH (ref 70–99)
HCO3 BLDV-SCNC: 20 MMOL/L — LOW (ref 22–29)
HCO3 BLDV-SCNC: 24 MMOL/L — SIGNIFICANT CHANGE UP (ref 22–29)
HCT VFR BLD CALC: 24.5 % — LOW (ref 39–50)
HCT VFR BLD CALC: 26.1 % — LOW (ref 39–50)
HCT VFR BLD CALC: 26.7 % — LOW (ref 39–50)
HGB BLD-MCNC: 8.3 G/DL — LOW (ref 13–17)
HGB BLD-MCNC: 8.8 G/DL — LOW (ref 13–17)
HGB BLD-MCNC: 8.9 G/DL — LOW (ref 13–17)
INR BLD: 1.28 — HIGH (ref 0.88–1.16)
INR BLD: 1.35 — HIGH (ref 0.88–1.16)
INR BLD: 1.36 — HIGH (ref 0.88–1.16)
LACTATE SERPL-SCNC: 0.7 MMOL/L — SIGNIFICANT CHANGE UP (ref 0.5–2)
LACTATE SERPL-SCNC: 0.7 MMOL/L — SIGNIFICANT CHANGE UP (ref 0.5–2)
MAGNESIUM SERPL-MCNC: 2.8 MG/DL — HIGH (ref 1.6–2.6)
MAGNESIUM SERPL-MCNC: 2.9 MG/DL — HIGH (ref 1.6–2.6)
MCHC RBC-ENTMCNC: 29 PG — SIGNIFICANT CHANGE UP (ref 27–34)
MCHC RBC-ENTMCNC: 30.2 PG — SIGNIFICANT CHANGE UP (ref 27–34)
MCHC RBC-ENTMCNC: 30.2 PG — SIGNIFICANT CHANGE UP (ref 27–34)
MCHC RBC-ENTMCNC: 33 GM/DL — SIGNIFICANT CHANGE UP (ref 32–36)
MCHC RBC-ENTMCNC: 33.9 GM/DL — SIGNIFICANT CHANGE UP (ref 32–36)
MCHC RBC-ENTMCNC: 34.1 GM/DL — SIGNIFICANT CHANGE UP (ref 32–36)
MCV RBC AUTO: 88.1 FL — SIGNIFICANT CHANGE UP (ref 80–100)
MCV RBC AUTO: 88.5 FL — SIGNIFICANT CHANGE UP (ref 80–100)
MCV RBC AUTO: 89.1 FL — SIGNIFICANT CHANGE UP (ref 80–100)
NRBC # BLD: 0 /100 WBCS — SIGNIFICANT CHANGE UP (ref 0–0)
PCO2 BLDV: 32 MMHG — LOW (ref 42–55)
PCO2 BLDV: 37 MMHG — LOW (ref 42–55)
PH BLDV: 7.41 — SIGNIFICANT CHANGE UP (ref 7.32–7.43)
PH BLDV: 7.42 — SIGNIFICANT CHANGE UP (ref 7.32–7.43)
PHOSPHATE SERPL-MCNC: 2.9 MG/DL — SIGNIFICANT CHANGE UP (ref 2.5–4.5)
PHOSPHATE SERPL-MCNC: 3.1 MG/DL — SIGNIFICANT CHANGE UP (ref 2.5–4.5)
PHOSPHATE SERPL-MCNC: 3.5 MG/DL — SIGNIFICANT CHANGE UP (ref 2.5–4.5)
PLATELET # BLD AUTO: 157 K/UL — SIGNIFICANT CHANGE UP (ref 150–400)
PLATELET # BLD AUTO: 181 K/UL — SIGNIFICANT CHANGE UP (ref 150–400)
PLATELET # BLD AUTO: 186 K/UL — SIGNIFICANT CHANGE UP (ref 150–400)
PO2 BLDV: 45 MMHG — SIGNIFICANT CHANGE UP (ref 25–45)
PO2 BLDV: 45 MMHG — SIGNIFICANT CHANGE UP (ref 25–45)
POTASSIUM BLDV-SCNC: 3.8 MMOL/L — SIGNIFICANT CHANGE UP (ref 3.5–5.1)
POTASSIUM SERPL-MCNC: 4 MMOL/L — SIGNIFICANT CHANGE UP (ref 3.5–5.3)
POTASSIUM SERPL-MCNC: 4.1 MMOL/L — SIGNIFICANT CHANGE UP (ref 3.5–5.3)
POTASSIUM SERPL-MCNC: 4.2 MMOL/L — SIGNIFICANT CHANGE UP (ref 3.5–5.3)
POTASSIUM SERPL-MCNC: 4.3 MMOL/L — SIGNIFICANT CHANGE UP (ref 3.5–5.3)
POTASSIUM SERPL-SCNC: 4 MMOL/L — SIGNIFICANT CHANGE UP (ref 3.5–5.3)
POTASSIUM SERPL-SCNC: 4.1 MMOL/L — SIGNIFICANT CHANGE UP (ref 3.5–5.3)
POTASSIUM SERPL-SCNC: 4.2 MMOL/L — SIGNIFICANT CHANGE UP (ref 3.5–5.3)
POTASSIUM SERPL-SCNC: 4.3 MMOL/L — SIGNIFICANT CHANGE UP (ref 3.5–5.3)
PROT SERPL-MCNC: 6.4 G/DL — SIGNIFICANT CHANGE UP (ref 6–8.3)
PROT SERPL-MCNC: 6.8 G/DL — SIGNIFICANT CHANGE UP (ref 6–8.3)
PROT SERPL-MCNC: 7.2 G/DL — SIGNIFICANT CHANGE UP (ref 6–8.3)
PROTHROM AB SERPL-ACNC: 15.3 SEC — HIGH (ref 10.5–13.4)
PROTHROM AB SERPL-ACNC: 16.1 SEC — HIGH (ref 10.5–13.4)
PROTHROM AB SERPL-ACNC: 16.2 SEC — HIGH (ref 10.5–13.4)
RBC # BLD: 2.75 M/UL — LOW (ref 4.2–5.8)
RBC # BLD: 2.95 M/UL — LOW (ref 4.2–5.8)
RBC # BLD: 3.03 M/UL — LOW (ref 4.2–5.8)
RBC # FLD: 14.2 % — SIGNIFICANT CHANGE UP (ref 10.3–14.5)
RBC # FLD: 14.3 % — SIGNIFICANT CHANGE UP (ref 10.3–14.5)
RBC # FLD: 14.5 % — SIGNIFICANT CHANGE UP (ref 10.3–14.5)
SAO2 % BLDV: 77 % — SIGNIFICANT CHANGE UP (ref 67–88)
SAO2 % BLDV: 77.5 % — SIGNIFICANT CHANGE UP (ref 67–88)
SODIUM SERPL-SCNC: 143 MMOL/L — SIGNIFICANT CHANGE UP (ref 135–145)
SODIUM SERPL-SCNC: 143 MMOL/L — SIGNIFICANT CHANGE UP (ref 135–145)
SODIUM SERPL-SCNC: 144 MMOL/L — SIGNIFICANT CHANGE UP (ref 135–145)
WBC # BLD: 10.8 K/UL — HIGH (ref 3.8–10.5)
WBC # BLD: 5.71 K/UL — SIGNIFICANT CHANGE UP (ref 3.8–10.5)
WBC # BLD: 8.42 K/UL — SIGNIFICANT CHANGE UP (ref 3.8–10.5)
WBC # FLD AUTO: 10.8 K/UL — HIGH (ref 3.8–10.5)
WBC # FLD AUTO: 5.71 K/UL — SIGNIFICANT CHANGE UP (ref 3.8–10.5)
WBC # FLD AUTO: 8.42 K/UL — SIGNIFICANT CHANGE UP (ref 3.8–10.5)

## 2022-05-25 PROCEDURE — 99292 CRITICAL CARE ADDL 30 MIN: CPT

## 2022-05-25 PROCEDURE — 99291 CRITICAL CARE FIRST HOUR: CPT

## 2022-05-25 PROCEDURE — 99221 1ST HOSP IP/OBS SF/LOW 40: CPT

## 2022-05-25 PROCEDURE — 71045 X-RAY EXAM CHEST 1 VIEW: CPT | Mod: 26

## 2022-05-25 RX ORDER — ALBUMIN HUMAN 25 %
250 VIAL (ML) INTRAVENOUS ONCE
Refills: 0 | Status: COMPLETED | OUTPATIENT
Start: 2022-05-25 | End: 2022-05-25

## 2022-05-25 RX ORDER — NICARDIPINE HYDROCHLORIDE 30 MG/1
5 CAPSULE, EXTENDED RELEASE ORAL
Qty: 40 | Refills: 0 | Status: DISCONTINUED | OUTPATIENT
Start: 2022-05-25 | End: 2022-05-30

## 2022-05-25 RX ORDER — FOLIC ACID 0.8 MG
1 TABLET ORAL DAILY
Refills: 0 | Status: DISCONTINUED | OUTPATIENT
Start: 2022-05-25 | End: 2022-06-06

## 2022-05-25 RX ORDER — ALBUMIN HUMAN 25 %
250 VIAL (ML) INTRAVENOUS
Refills: 0 | Status: COMPLETED | OUTPATIENT
Start: 2022-05-25 | End: 2022-05-25

## 2022-05-25 RX ORDER — ACETAMINOPHEN 500 MG
1000 TABLET ORAL ONCE
Refills: 0 | Status: COMPLETED | OUTPATIENT
Start: 2022-05-25 | End: 2022-05-25

## 2022-05-25 RX ORDER — ALBUMIN HUMAN 25 %
250 VIAL (ML) INTRAVENOUS
Refills: 0 | Status: COMPLETED | OUTPATIENT
Start: 2022-05-25 | End: 2022-05-24

## 2022-05-25 RX ORDER — FUROSEMIDE 40 MG
60 TABLET ORAL DAILY
Refills: 0 | Status: ACTIVE | OUTPATIENT
Start: 2022-05-25 | End: 2023-04-23

## 2022-05-25 RX ORDER — ASPIRIN/CALCIUM CARB/MAGNESIUM 324 MG
81 TABLET ORAL DAILY
Refills: 0 | Status: DISCONTINUED | OUTPATIENT
Start: 2022-05-25 | End: 2022-06-06

## 2022-05-25 RX ORDER — FUROSEMIDE 40 MG
20 TABLET ORAL ONCE
Refills: 0 | Status: COMPLETED | OUTPATIENT
Start: 2022-05-25 | End: 2022-05-25

## 2022-05-25 RX ORDER — THIAMINE MONONITRATE (VIT B1) 100 MG
100 TABLET ORAL DAILY
Refills: 0 | Status: DISCONTINUED | OUTPATIENT
Start: 2022-05-25 | End: 2022-06-06

## 2022-05-25 RX ORDER — FENTANYL CITRATE 50 UG/ML
25 INJECTION INTRAVENOUS
Refills: 0 | Status: DISCONTINUED | OUTPATIENT
Start: 2022-05-25 | End: 2022-05-30

## 2022-05-25 RX ADMIN — Medication 125 MILLILITER(S): at 09:20

## 2022-05-25 RX ADMIN — Medication 60 MILLIGRAM(S): at 23:15

## 2022-05-25 RX ADMIN — HEPARIN SODIUM 5000 UNIT(S): 5000 INJECTION INTRAVENOUS; SUBCUTANEOUS at 21:38

## 2022-05-25 RX ADMIN — NICARDIPINE HYDROCHLORIDE 25 MG/HR: 30 CAPSULE, EXTENDED RELEASE ORAL at 14:12

## 2022-05-25 RX ADMIN — CHLORHEXIDINE GLUCONATE 15 MILLILITER(S): 213 SOLUTION TOPICAL at 07:37

## 2022-05-25 RX ADMIN — FENTANYL CITRATE 25 MICROGRAM(S): 50 INJECTION INTRAVENOUS at 03:17

## 2022-05-25 RX ADMIN — FENTANYL CITRATE 25 MICROGRAM(S): 50 INJECTION INTRAVENOUS at 03:30

## 2022-05-25 RX ADMIN — CHLORHEXIDINE GLUCONATE 1 APPLICATION(S): 213 SOLUTION TOPICAL at 11:05

## 2022-05-25 RX ADMIN — NICARDIPINE HYDROCHLORIDE 25 MG/HR: 30 CAPSULE, EXTENDED RELEASE ORAL at 09:10

## 2022-05-25 RX ADMIN — NICARDIPINE HYDROCHLORIDE 25 MG/HR: 30 CAPSULE, EXTENDED RELEASE ORAL at 19:35

## 2022-05-25 RX ADMIN — Medication 1000 MILLIGRAM(S): at 10:44

## 2022-05-25 RX ADMIN — Medication 400 MILLIGRAM(S): at 16:20

## 2022-05-25 RX ADMIN — Medication 100 MILLIGRAM(S): at 07:36

## 2022-05-25 RX ADMIN — SODIUM CHLORIDE 3 MILLILITER(S): 9 INJECTION INTRAMUSCULAR; INTRAVENOUS; SUBCUTANEOUS at 12:20

## 2022-05-25 RX ADMIN — Medication 1 PATCH: at 10:14

## 2022-05-25 RX ADMIN — Medication 125 MILLILITER(S): at 15:29

## 2022-05-25 RX ADMIN — HEPARIN SODIUM 5000 UNIT(S): 5000 INJECTION INTRAVENOUS; SUBCUTANEOUS at 07:36

## 2022-05-25 RX ADMIN — PANTOPRAZOLE SODIUM 40 MILLIGRAM(S): 20 TABLET, DELAYED RELEASE ORAL at 11:07

## 2022-05-25 RX ADMIN — Medication 100 MILLIGRAM(S): at 15:06

## 2022-05-25 RX ADMIN — MILRINONE LACTATE 3.06 MICROGRAM(S)/KG/MIN: 1 INJECTION, SOLUTION INTRAVENOUS at 10:29

## 2022-05-25 RX ADMIN — Medication 400 MILLIGRAM(S): at 10:29

## 2022-05-25 RX ADMIN — HEPARIN SODIUM 5000 UNIT(S): 5000 INJECTION INTRAVENOUS; SUBCUTANEOUS at 14:05

## 2022-05-25 RX ADMIN — Medication 1000 MILLIGRAM(S): at 17:29

## 2022-05-25 RX ADMIN — FENTANYL CITRATE 25 MICROGRAM(S): 50 INJECTION INTRAVENOUS at 00:45

## 2022-05-25 RX ADMIN — SODIUM CHLORIDE 3 MILLILITER(S): 9 INJECTION INTRAMUSCULAR; INTRAVENOUS; SUBCUTANEOUS at 21:32

## 2022-05-25 RX ADMIN — NICARDIPINE HYDROCHLORIDE 25 MG/HR: 30 CAPSULE, EXTENDED RELEASE ORAL at 15:06

## 2022-05-25 RX ADMIN — NICARDIPINE HYDROCHLORIDE 25 MG/HR: 30 CAPSULE, EXTENDED RELEASE ORAL at 12:01

## 2022-05-25 RX ADMIN — Medication 1 PATCH: at 18:01

## 2022-05-25 RX ADMIN — Medication 20 MILLIGRAM(S): at 21:38

## 2022-05-25 RX ADMIN — Medication 125 MILLILITER(S): at 16:30

## 2022-05-25 RX ADMIN — FENTANYL CITRATE 25 MICROGRAM(S): 50 INJECTION INTRAVENOUS at 22:00

## 2022-05-25 RX ADMIN — CHLORHEXIDINE GLUCONATE 5 MILLILITER(S): 213 SOLUTION TOPICAL at 07:37

## 2022-05-25 RX ADMIN — FENTANYL CITRATE 25 MICROGRAM(S): 50 INJECTION INTRAVENOUS at 21:38

## 2022-05-25 NOTE — SWALLOW BEDSIDE ASSESSMENT ADULT - NS SPL SWALLOW CLINIC TRIAL FT
Limited assessment of oral phase as purees and solids were deferred. Adequate bolus containment. Laryngeal movement palpated. Subtle throat clear and increasing wet vocal quality with liquids suggestive of at least deep laryngeal penetration.

## 2022-05-25 NOTE — PHYSICAL THERAPY INITIAL EVALUATION ADULT - MANUAL MUSCLE TESTING RESULTS, REHAB EVAL
RUE flexion 2/5, elbow flexion 3/5, grasp decreased, LUE shoulder flexion 3/5, elbow flexion 3/5, BLE at least 3/5 throughout

## 2022-05-25 NOTE — PHYSICAL THERAPY INITIAL EVALUATION ADULT - GENERAL OBSERVATIONS, REHAB EVAL
TORI Harris present throughout session. Patient received semi-supine in NAD with +ECG +central line +a-line +SCD x 2 +CT to suction +3 claudine to suction +britt +6L O2 NC +sternal incision C/D/I

## 2022-05-25 NOTE — PHYSICAL THERAPY INITIAL EVALUATION ADULT - PERTINENT HX OF CURRENT PROBLEM, REHAB EVAL
Patient is a 61 year old male admitted to outside hospital with CP on 5/16/2022 , cardiac cath shows non-obstructive CAD, EF without valvulopathy and normal EF, upon discharge planning (5/21/2022) patient found to be febrile and underwent infectious work-up.  CT chest shows thoracic aortic changes. Patient transferred to [facility] under the care of Dr. Ford for surgical evaluation.

## 2022-05-25 NOTE — PROGRESS NOTE ADULT - NS ATTEND AMEND GEN_ALL_CORE FT
Exam limited due to poor participation/pain, but appears nonfocal. Has chronic lacunar infarcts on CTH for which vascular risk factors should be addressed aggressively and he should continue on aspirin, statin therapy. He can have outpatient stroke f/u for further vascular risk factor modificaiton and f/u of carotid dissection.

## 2022-05-25 NOTE — PROGRESS NOTE ADULT - ASSESSMENT
61 year old male with PMHx of HTN, HLD, Current smoker (30 pack year history), admitted to outside hospital with CP on 5/16/2022 , cardiac cath shows non-obstructive CAD, EF without valvulopathy and normal EF, upon discharge (5/21/2022) patient found to be febrile and underwent infectious work-up. CT chest shows thoracic aortic changes with follow up CTA chest showing chronic thrombosed dissection versus intramural hematoma along the thoracic aorta, small pericardial effusion and left pleural effusion vs hemothorax. Patient transferred to [facility] under the care of Dr. Ford for surgical evaluation.     - INCOMPLETE RECS   61 year old male with PMHx of HTN, HLD, Current smoker (30 pack year history), admitted to outside hospital with CP on 5/16/2022 , cardiac cath shows non-obstructive CAD, EF without valvulopathy and normal EF, upon discharge (5/21/2022) patient found to be febrile and underwent infectious work-up. CT chest shows thoracic aortic changes with follow up CTA chest showing chronic thrombosed dissection versus intramural hematoma along the thoracic aorta, small pericardial effusion and left pleural effusion vs hemothorax. Patient transferred to [facility] under the care of Dr. Ford for surgical evaluation.     - Patient reevaluated s/p extubation and aortic dissection repair. Recommend continuing ASA 81mg as per primary team. Neurological exam remains nonfocal, stroke team will sign off at this time. Please reach out with any further questions or concerns.     Case discussed with Neurology Attending Dr. Pili Mancilla 61 year old male with PMHx of HTN, HLD, Current smoker (30 pack year history), admitted to outside hospital with CP on 5/16/2022 , cardiac cath shows non-obstructive CAD, EF without valvulopathy and normal EF, upon discharge (5/21/2022) patient found to be febrile and underwent infectious work-up. CT chest shows thoracic aortic changes with follow up CTA chest showing chronic thrombosed dissection versus intramural hematoma along the thoracic aorta, small pericardial effusion and left pleural effusion vs hemothorax. Patient transferred to [facility] under the care of Dr. Ford for surgical evaluation.     - Patient reevaluated s/p extubation and aortic dissection repair. Recommend continuing ASA 81mg and statin therapy.  - Patient will need outpatient stroke f/u for risk factors: tobacco use, HTN, HLD. Please reach out prior to d/c and we can set up with our team.  - Neurological exam remains nonfocal, stroke team will sign off at this time. Please reach out with any further questions or concerns.     Case discussed with Neurology Attending Dr. Pili Mancilla

## 2022-05-25 NOTE — PROGRESS NOTE ADULT - SUBJECTIVE AND OBJECTIVE BOX
Neurology Stroke Progress Note    INTERVAL HPI/OVERNIGHT EVENTS:  Patient seen and examined. Patient is currently intubated, will possibly be extubated later today. 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     MEDICATIONS  (STANDING):  aspirin  chewable 81 milliGRAM(s) Oral daily  ceFAZolin   IVPB 2000 milliGRAM(s) IV Intermittent every 8 hours  chlorhexidine 0.12% Liquid 15 milliLiter(s) Oral Mucosa every 12 hours  chlorhexidine 2% Cloths 1 Application(s) Topical daily  dextrose 50% Injectable 50 milliLiter(s) IV Push every 15 minutes  dextrose 50% Injectable 25 milliLiter(s) IV Push every 15 minutes  folic acid 1 milliGRAM(s) Oral daily  heparin   Injectable 5000 Unit(s) SubCutaneous every 8 hours  insulin regular Infusion 1 Unit(s)/Hr (1 mL/Hr) IV Continuous <Continuous>  milrinone Infusion 0.125 MICROgram(s)/kG/Min (3.06 mL/Hr) IV Continuous <Continuous>  multivitamin 1 Tablet(s) Oral daily  niCARdipine Infusion 5 mG/Hr (25 mL/Hr) IV Continuous <Continuous>  pantoprazole  Injectable 40 milliGRAM(s) IV Push daily  sodium chloride 0.9% lock flush 3 milliLiter(s) IV Push every 8 hours  sodium chloride 0.9%. 1000 milliLiter(s) (10 mL/Hr) IV Continuous <Continuous>  thiamine 100 milliGRAM(s) Oral daily    MEDICATIONS  (PRN):  acetaminophen     Tablet .. 650 milliGRAM(s) Oral every 6 hours PRN Mild Pain (1 - 3)  fentaNYL    Injectable 25 MICROGram(s) IV Push every 3 hours PRN Severe Pain (7 - 10)      Allergies    No Known Allergies    Intolerances        Vital Signs Last 24 Hrs  T(C): 37.5 (25 May 2022 09:27), Max: 37.5 (25 May 2022 09:27)  T(F): 99.5 (25 May 2022 09:27), Max: 99.5 (25 May 2022 09:27)  HR: 68 (25 May 2022 12:00) (60 - 80)  BP: --  BP(mean): --  RR: 20 (25 May 2022 12:00) (14 - 20)  SpO2: 96% (25 May 2022 12:00) (95% - 100%)    Physical exam:  General: No acute distress, awake and alert  Eyes: Anicteric sclerae, moist conjunctivae, see below for CNs  Neck: trachea midline, FROM, supple, no thyromegaly or lymphadenopathy  Cardiovascular: Regular rate and rhythm, no murmurs, rubs, or gallops. No carotid bruits.   Pulmonary: Anterior breath sounds clear bilaterally, no crackles or wheezing. No use of accessory muscles  GI: Abdomen soft, non-distended, non-tender  Extremities: Radial and DP pulses +2, no edema    Neurologic:  -Mental status: Awake, alert, oriented to person, place, and time. Speech is fluent with intact naming, repetition, and comprehension, no dysarthria. Recent and remote memory intact. Follows commands. Attention/concentration intact. Fund of knowledge appropriate.  -Cranial nerves:   II: Visual fields are full to confrontation.  III, IV, VI: Extraocular movements are intact without nystagmus. Pupils equally round and reactive to light  V:  Facial sensation V1-V3 equal and intact   VII: Face is symmetric with normal eye closure and smile  VIII: Hearing is bilaterally intact to finger rub  IX, X: Uvula is midline and soft palate rises symmetrically  XI: Head turning and shoulder shrug are intact.  XII: Tongue protrudes midline  Motor: Normal bulk and tone. No pronator drift. Strength bilateral upper extremity 5/5, bilateral lower extremities 5/5.  Rapid alternating movements intact and symmetric  Sensation: Intact to light touch bilaterally. No neglect or extinction on double simultaneous testing.  Coordination: No dysmetria on finger-to-nose and heel-to-shin bilaterally  Reflexes: Downgoing toes bilaterally   Gait: Narrow gait and steady    LABS:                        8.9    8.42  )-----------( 181      ( 25 May 2022 09:04 )             26.1     05-25    143  |  109<H>  |  20  ----------------------------<  134<H>  4.2   |  22  |  2.16<H>    Ca    8.5      25 May 2022 09:04  Phos  3.1     05-25  Mg     2.8     05-25    TPro  6.8  /  Alb  3.8  /  TBili  0.6  /  DBili  x   /  AST  34  /  ALT  19  /  AlkPhos  61  05-25    PT/INR - ( 25 May 2022 09:04 )   PT: 16.1 sec;   INR: 1.35          PTT - ( 25 May 2022 09:04 )  PTT:27.9 sec      RADIOLOGY & ADDITIONAL TESTS:     Neurology Stroke Progress Note    INTERVAL HPI/OVERNIGHT EVENTS:  Patient seen and examined. Patient is currently intubated, will possibly be extubated later today. He underwent an aortic dissection repair on 5/24.     MEDICATIONS  (STANDING):  aspirin  chewable 81 milliGRAM(s) Oral daily  ceFAZolin   IVPB 2000 milliGRAM(s) IV Intermittent every 8 hours  chlorhexidine 0.12% Liquid 15 milliLiter(s) Oral Mucosa every 12 hours  chlorhexidine 2% Cloths 1 Application(s) Topical daily  dextrose 50% Injectable 50 milliLiter(s) IV Push every 15 minutes  dextrose 50% Injectable 25 milliLiter(s) IV Push every 15 minutes  folic acid 1 milliGRAM(s) Oral daily  heparin   Injectable 5000 Unit(s) SubCutaneous every 8 hours  insulin regular Infusion 1 Unit(s)/Hr (1 mL/Hr) IV Continuous <Continuous>  milrinone Infusion 0.125 MICROgram(s)/kG/Min (3.06 mL/Hr) IV Continuous <Continuous>  multivitamin 1 Tablet(s) Oral daily  niCARdipine Infusion 5 mG/Hr (25 mL/Hr) IV Continuous <Continuous>  pantoprazole  Injectable 40 milliGRAM(s) IV Push daily  sodium chloride 0.9% lock flush 3 milliLiter(s) IV Push every 8 hours  sodium chloride 0.9%. 1000 milliLiter(s) (10 mL/Hr) IV Continuous <Continuous>  thiamine 100 milliGRAM(s) Oral daily    MEDICATIONS  (PRN):  acetaminophen     Tablet .. 650 milliGRAM(s) Oral every 6 hours PRN Mild Pain (1 - 3)  fentaNYL    Injectable 25 MICROGram(s) IV Push every 3 hours PRN Severe Pain (7 - 10)      Allergies    No Known Allergies    Intolerances        Vital Signs Last 24 Hrs  T(C): 37.5 (25 May 2022 09:27), Max: 37.5 (25 May 2022 09:27)  T(F): 99.5 (25 May 2022 09:27), Max: 99.5 (25 May 2022 09:27)  HR: 68 (25 May 2022 12:00) (60 - 80)  BP: --  BP(mean): --  RR: 20 (25 May 2022 12:00) (14 - 20)  SpO2: 96% (25 May 2022 12:00) (95% - 100%)    Physical exam:  General: No acute distress, awake and alert  Eyes: Anicteric sclerae, moist conjunctivae, see below for CNs  Neck: trachea midline  Cardiovascular: Regular rate on monitor  Pulmonary: No use of accessory muscles  GI: Abdomen soft, non-distended, non-tender  Extremities: no edema    Neurologic:  -Mental status: Awake, alert, ET tube in place. Able to nod appropriately when asked his name, nods appropriately when asked if he is in the hospital. Appears confused when asked if he knows the year or month.    -Cranial nerves:   II: Visual fields are full to confrontation.  III, IV, VI: Extraocular movements are intact without nystagmus. Pupils equally round and reactive to light  VII: Face appears symmetrical   Motor: Normal bulk and tone. No pronator drift. Bilateral upper extremities 2/5. Right lower extremity 2/5, left lower extremity slightly weaker than right, 2-/5.   Sensation: Sensation grossly intact    LABS:                        8.9    8.42  )-----------( 181      ( 25 May 2022 09:04 )             26.1     05-25    143  |  109<H>  |  20  ----------------------------<  134<H>  4.2   |  22  |  2.16<H>    Ca    8.5      25 May 2022 09:04  Phos  3.1     05-25  Mg     2.8     05-25    TPro  6.8  /  Alb  3.8  /  TBili  0.6  /  DBili  x   /  AST  34  /  ALT  19  /  AlkPhos  61  05-25    PT/INR - ( 25 May 2022 09:04 )   PT: 16.1 sec;   INR: 1.35          PTT - ( 25 May 2022 09:04 )  PTT:27.9 sec      RADIOLOGY & ADDITIONAL TESTS:    < from: CT Head No Cont (05.23.22 @ 11:07) >  There is hypoattenuation of the subcortical and periventricular white   matter, which is nonspecific finding, but most likely represents sequela   of chronic microvascular ischemic disease. There are chronic lacunar   infarcts in the bilateral basal ganglia, more pronounced on the left side   There are atherosclerotic calcifications of the cavernous internal   carotid arteries bilaterally. There is prominence of the cortical sulci   related to underlying brain parenchymal volume loss.    There is no evidence of hydrocephalus. There are no extra-axial fluid   collections. There is an enlarged, partially empty sella turcica.    The visualized intraorbital contents are normal. The imaged portions of   the paranasal sinuses are aerated. The mastoid air cells are clear. The   visualized soft tissues and osseous structures appear normal.    < end of copied text >   Neurology Stroke Progress Note    INTERVAL HPI/OVERNIGHT EVENTS:  Patient seen and examined. Patient is currently intubated, will possibly be extubated later today. He underwent an aortic dissection repair on 5/24.     MEDICATIONS  (STANDING):  aspirin  chewable 81 milliGRAM(s) Oral daily  ceFAZolin   IVPB 2000 milliGRAM(s) IV Intermittent every 8 hours  chlorhexidine 0.12% Liquid 15 milliLiter(s) Oral Mucosa every 12 hours  chlorhexidine 2% Cloths 1 Application(s) Topical daily  dextrose 50% Injectable 50 milliLiter(s) IV Push every 15 minutes  dextrose 50% Injectable 25 milliLiter(s) IV Push every 15 minutes  folic acid 1 milliGRAM(s) Oral daily  heparin   Injectable 5000 Unit(s) SubCutaneous every 8 hours  insulin regular Infusion 1 Unit(s)/Hr (1 mL/Hr) IV Continuous <Continuous>  milrinone Infusion 0.125 MICROgram(s)/kG/Min (3.06 mL/Hr) IV Continuous <Continuous>  multivitamin 1 Tablet(s) Oral daily  niCARdipine Infusion 5 mG/Hr (25 mL/Hr) IV Continuous <Continuous>  pantoprazole  Injectable 40 milliGRAM(s) IV Push daily  sodium chloride 0.9% lock flush 3 milliLiter(s) IV Push every 8 hours  sodium chloride 0.9%. 1000 milliLiter(s) (10 mL/Hr) IV Continuous <Continuous>  thiamine 100 milliGRAM(s) Oral daily    MEDICATIONS  (PRN):  acetaminophen     Tablet .. 650 milliGRAM(s) Oral every 6 hours PRN Mild Pain (1 - 3)  fentaNYL    Injectable 25 MICROGram(s) IV Push every 3 hours PRN Severe Pain (7 - 10)      Allergies    No Known Allergies    Intolerances        Vital Signs Last 24 Hrs  T(C): 37.5 (25 May 2022 09:27), Max: 37.5 (25 May 2022 09:27)  T(F): 99.5 (25 May 2022 09:27), Max: 99.5 (25 May 2022 09:27)  HR: 68 (25 May 2022 12:00) (60 - 80)  BP: --  BP(mean): --  RR: 20 (25 May 2022 12:00) (14 - 20)  SpO2: 96% (25 May 2022 12:00) (95% - 100%)    Physical exam:  General: No acute distress, awake and alert  Eyes: Anicteric sclerae, moist conjunctivae, see below for CNs  Neck: trachea midline  Cardiovascular: Regular rate on monitor  Pulmonary: No use of accessory muscles  GI: Abdomen soft, non-distended, non-tender  Extremities: no edema    Neurologic:  -Mental status: Awake, alert, ET tube in place. Able to nod appropriately when asked his name, nods appropriately when asked if he is in the hospital. Appears confused when asked if he knows the year or month.    -Cranial nerves:   II: Visual fields are full to confrontation.  III, IV, VI: Extraocular movements are intact without nystagmus. Pupils equally round and reactive to light  VII: Face appears symmetrical   Motor: Normal bulk and tone. Bilateral upper extremities 2/5. Right lower extremity 2/5, left lower extremity slightly weaker than right, 2-/5.   Sensation: Sensation grossly intact    LABS:                        8.9    8.42  )-----------( 181      ( 25 May 2022 09:04 )             26.1     05-25    143  |  109<H>  |  20  ----------------------------<  134<H>  4.2   |  22  |  2.16<H>    Ca    8.5      25 May 2022 09:04  Phos  3.1     05-25  Mg     2.8     05-25    TPro  6.8  /  Alb  3.8  /  TBili  0.6  /  DBili  x   /  AST  34  /  ALT  19  /  AlkPhos  61  05-25    PT/INR - ( 25 May 2022 09:04 )   PT: 16.1 sec;   INR: 1.35          PTT - ( 25 May 2022 09:04 )  PTT:27.9 sec      RADIOLOGY & ADDITIONAL TESTS:    < from: CT Head No Cont (05.23.22 @ 11:07) >  There is hypoattenuation of the subcortical and periventricular white   matter, which is nonspecific finding, but most likely represents sequela   of chronic microvascular ischemic disease. There are chronic lacunar   infarcts in the bilateral basal ganglia, more pronounced on the left side   There are atherosclerotic calcifications of the cavernous internal   carotid arteries bilaterally. There is prominence of the cortical sulci   related to underlying brain parenchymal volume loss.    There is no evidence of hydrocephalus. There are no extra-axial fluid   collections. There is an enlarged, partially empty sella turcica.    The visualized intraorbital contents are normal. The imaged portions of   the paranasal sinuses are aerated. The mastoid air cells are clear. The   visualized soft tissues and osseous structures appear normal.    < end of copied text >   Neurology Stroke Progress Note    INTERVAL HPI/OVERNIGHT EVENTS:  Patient seen and examined. Patient is currently intubated, will possibly be extubated later today. He underwent an aortic dissection repair on 5/24.     MEDICATIONS  (STANDING):  aspirin  chewable 81 milliGRAM(s) Oral daily  ceFAZolin   IVPB 2000 milliGRAM(s) IV Intermittent every 8 hours  chlorhexidine 0.12% Liquid 15 milliLiter(s) Oral Mucosa every 12 hours  chlorhexidine 2% Cloths 1 Application(s) Topical daily  dextrose 50% Injectable 50 milliLiter(s) IV Push every 15 minutes  dextrose 50% Injectable 25 milliLiter(s) IV Push every 15 minutes  folic acid 1 milliGRAM(s) Oral daily  heparin   Injectable 5000 Unit(s) SubCutaneous every 8 hours  insulin regular Infusion 1 Unit(s)/Hr (1 mL/Hr) IV Continuous <Continuous>  milrinone Infusion 0.125 MICROgram(s)/kG/Min (3.06 mL/Hr) IV Continuous <Continuous>  multivitamin 1 Tablet(s) Oral daily  niCARdipine Infusion 5 mG/Hr (25 mL/Hr) IV Continuous <Continuous>  pantoprazole  Injectable 40 milliGRAM(s) IV Push daily  sodium chloride 0.9% lock flush 3 milliLiter(s) IV Push every 8 hours  sodium chloride 0.9%. 1000 milliLiter(s) (10 mL/Hr) IV Continuous <Continuous>  thiamine 100 milliGRAM(s) Oral daily    MEDICATIONS  (PRN):  acetaminophen     Tablet .. 650 milliGRAM(s) Oral every 6 hours PRN Mild Pain (1 - 3)  fentaNYL    Injectable 25 MICROGram(s) IV Push every 3 hours PRN Severe Pain (7 - 10)      Allergies    No Known Allergies    Intolerances        Vital Signs Last 24 Hrs  T(C): 37.5 (25 May 2022 09:27), Max: 37.5 (25 May 2022 09:27)  T(F): 99.5 (25 May 2022 09:27), Max: 99.5 (25 May 2022 09:27)  HR: 68 (25 May 2022 12:00) (60 - 80)  BP: --  BP(mean): --  RR: 20 (25 May 2022 12:00) (14 - 20)  SpO2: 96% (25 May 2022 12:00) (95% - 100%)    Physical exam:  General: No acute distress, awake and alert  Eyes: Anicteric sclerae, moist conjunctivae, see below for CNs  Neck: trachea midline  Cardiovascular: Regular rate on monitor  Pulmonary: No use of accessory muscles  GI: Abdomen soft, non-distended, non-tender  Extremities: no edema    Neurologic:  -Mental status: Awake, alert, oriented to person, place, and time. Speech is hypophonic and slow. Follows commands.   -Cranial nerves:   II: Visual fields are full to confrontation.  III, IV, VI: Extraocular movements are intact without nystagmus. Pupils equally round and reactive to light  VII: Face appears symmetrical   Motor: Normal bulk and tone. Bilateral upper extremities 3/5, movement limited to pain. Right lower extremity 2/5, left lower extremity slightly weaker than right, 2-/5.   Sensation: Sensation grossly intact    LABS:                        8.9    8.42  )-----------( 181      ( 25 May 2022 09:04 )             26.1     05-25    143  |  109<H>  |  20  ----------------------------<  134<H>  4.2   |  22  |  2.16<H>    Ca    8.5      25 May 2022 09:04  Phos  3.1     05-25  Mg     2.8     05-25    TPro  6.8  /  Alb  3.8  /  TBili  0.6  /  DBili  x   /  AST  34  /  ALT  19  /  AlkPhos  61  05-25    PT/INR - ( 25 May 2022 09:04 )   PT: 16.1 sec;   INR: 1.35          PTT - ( 25 May 2022 09:04 )  PTT:27.9 sec      RADIOLOGY & ADDITIONAL TESTS:    < from: CT Head No Cont (05.23.22 @ 11:07) >  There is hypoattenuation of the subcortical and periventricular white   matter, which is nonspecific finding, but most likely represents sequela   of chronic microvascular ischemic disease. There are chronic lacunar   infarcts in the bilateral basal ganglia, more pronounced on the left side   There are atherosclerotic calcifications of the cavernous internal   carotid arteries bilaterally. There is prominence of the cortical sulci   related to underlying brain parenchymal volume loss.    There is no evidence of hydrocephalus. There are no extra-axial fluid   collections. There is an enlarged, partially empty sella turcica.    The visualized intraorbital contents are normal. The imaged portions of   the paranasal sinuses are aerated. The mastoid air cells are clear. The   visualized soft tissues and osseous structures appear normal.    < end of copied text >   Neurology Stroke Progress Note    INTERVAL HPI/OVERNIGHT EVENTS:  Patient seen and examined. Patient is currently intubated, will possibly be extubated later today. He underwent an aortic dissection repair on 5/24.     MEDICATIONS  (STANDING):  aspirin  chewable 81 milliGRAM(s) Oral daily  ceFAZolin   IVPB 2000 milliGRAM(s) IV Intermittent every 8 hours  chlorhexidine 0.12% Liquid 15 milliLiter(s) Oral Mucosa every 12 hours  chlorhexidine 2% Cloths 1 Application(s) Topical daily  dextrose 50% Injectable 50 milliLiter(s) IV Push every 15 minutes  dextrose 50% Injectable 25 milliLiter(s) IV Push every 15 minutes  folic acid 1 milliGRAM(s) Oral daily  heparin   Injectable 5000 Unit(s) SubCutaneous every 8 hours  insulin regular Infusion 1 Unit(s)/Hr (1 mL/Hr) IV Continuous <Continuous>  milrinone Infusion 0.125 MICROgram(s)/kG/Min (3.06 mL/Hr) IV Continuous <Continuous>  multivitamin 1 Tablet(s) Oral daily  niCARdipine Infusion 5 mG/Hr (25 mL/Hr) IV Continuous <Continuous>  pantoprazole  Injectable 40 milliGRAM(s) IV Push daily  sodium chloride 0.9% lock flush 3 milliLiter(s) IV Push every 8 hours  sodium chloride 0.9%. 1000 milliLiter(s) (10 mL/Hr) IV Continuous <Continuous>  thiamine 100 milliGRAM(s) Oral daily    MEDICATIONS  (PRN):  acetaminophen     Tablet .. 650 milliGRAM(s) Oral every 6 hours PRN Mild Pain (1 - 3)  fentaNYL    Injectable 25 MICROGram(s) IV Push every 3 hours PRN Severe Pain (7 - 10)      Allergies    No Known Allergies    Intolerances        Vital Signs Last 24 Hrs  T(C): 37.5 (25 May 2022 09:27), Max: 37.5 (25 May 2022 09:27)  T(F): 99.5 (25 May 2022 09:27), Max: 99.5 (25 May 2022 09:27)  HR: 68 (25 May 2022 12:00) (60 - 80)  BP: --  BP(mean): --  RR: 20 (25 May 2022 12:00) (14 - 20)  SpO2: 96% (25 May 2022 12:00) (95% - 100%)    Physical exam:  General: No acute distress, awake and alert  Eyes: Anicteric sclerae, moist conjunctivae, see below for CNs  Neck: trachea midline  Cardiovascular: Regular rate on monitor  Pulmonary: No use of accessory muscles  GI: Abdomen soft, non-distended, non-tender  Extremities: no edema    Neurologic:  -Mental status: Awake, alert, oriented to person, place, and time. Speech is hypophonic and slow. Follows commands.   -Cranial nerves:   II: Visual fields are full to confrontation.  III, IV, VI: Extraocular movements are intact without nystagmus. Pupils equally round and reactive to light  VII: Face appears symmetrical   Motor: Normal bulk and tone. Bilateral upper extremities 3/5, movement limited to pain. Right lower extremity 3/5, left lower extremity 3/5.   Sensation: Sensation grossly intact    LABS:                        8.9    8.42  )-----------( 181      ( 25 May 2022 09:04 )             26.1     05-25    143  |  109<H>  |  20  ----------------------------<  134<H>  4.2   |  22  |  2.16<H>    Ca    8.5      25 May 2022 09:04  Phos  3.1     05-25  Mg     2.8     05-25    TPro  6.8  /  Alb  3.8  /  TBili  0.6  /  DBili  x   /  AST  34  /  ALT  19  /  AlkPhos  61  05-25    PT/INR - ( 25 May 2022 09:04 )   PT: 16.1 sec;   INR: 1.35          PTT - ( 25 May 2022 09:04 )  PTT:27.9 sec      RADIOLOGY & ADDITIONAL TESTS:    < from: CT Head No Cont (05.23.22 @ 11:07) >  There is hypoattenuation of the subcortical and periventricular white   matter, which is nonspecific finding, but most likely represents sequela   of chronic microvascular ischemic disease. There are chronic lacunar   infarcts in the bilateral basal ganglia, more pronounced on the left side   There are atherosclerotic calcifications of the cavernous internal   carotid arteries bilaterally. There is prominence of the cortical sulci   related to underlying brain parenchymal volume loss.    There is no evidence of hydrocephalus. There are no extra-axial fluid   collections. There is an enlarged, partially empty sella turcica.    The visualized intraorbital contents are normal. The imaged portions of   the paranasal sinuses are aerated. The mastoid air cells are clear. The   visualized soft tissues and osseous structures appear normal.    < end of copied text >

## 2022-05-25 NOTE — PHYSICAL THERAPY INITIAL EVALUATION ADULT - ADDITIONAL COMMENTS
When asked about PLOF and home set up patient stated "it's none of your business." Per MAGGIE Ramos patient lives alone in apartment home with 10 [place] and was independent PTA.

## 2022-05-25 NOTE — PROGRESS NOTE ADULT - SUBJECTIVE AND OBJECTIVE BOX
INTERVAL HPI/OVERNIGHT EVENTS:    Intramural aortic hematoma    POD#1 AVR; ascending/hemiarch replacement   EF 55%    60yo AA Male Hx active tobacco use (30 pk years), HTN - nonadherent to meds, Chol presented to [facility] 5/18 with chest pain and severe HA - (+)NSTEMI - [trop 0.07] & uncontrolled HTN     CT Head - chronic Left basal ganglia lacunar infarct; chronic small vessel ischemia; no hemorrhage     Inc LFT and fever  Abd sono 5/20: hepatomegaly; left renal cyst; bilateral pleural effusion    Cr 1.8 at presentation  Cath: non-obstructive CAD, EF 55%; no valvular disease   coreg/catapress/norvasc/imdur/nifedipine started for BP control    anticipated d/c home 5/21 - developed abd pain - vomiting & fever (100.7) - d/c deferred  CT 5/21: crescentic hyperdensity in descending thoracic aorta and faintly in ascending thoracic aorta concerning for intramural hematoma (new c/w 9/2021 imaging). small-mod pericardial effusion    based on above findings -   CTa Chest 5/22: periaortic hematoma alone the length of the thoracic aortic extending to upper abd; No dissection flap identified. moderate pericardial effusion, small pleural effusions L>> Rt   CTa A/P 5/22: chronic thrombosed dissection vs intramural hematoma along thoracic aorta     transferred to [facility]  patient awake/alert and interactive - denies chest pains sxs - none for days per patient  126/60 HR 60 sinus at presentation     IVF started in light of inc Cr and recent medically necessary contrast studies    BP control    By report - agitation preoperatively at night - versed/fentanyl/precedex given   CTa C/A/P 5/23: Michael type A intramural hemorrhage/dissecting aneurysm of thoracic aorta extending to the proximal suprarenal abdominal aorta    CT Head 5/23: No acute intracranial findings   Neuro consulted preOp -  baseline neurologic exam prior to aortic dissection repair. Disoriented, weak throughout at time of assessment - R arm weaker than left and left leg weaker than right side. Pt mildly tremulous as well.     to OR 5/24:     OR findings - subacute and acute Type A aortic dissection w/IM hematoma extending from root into innominate artery and descending aorta - hemopericardium   Intraop: 2L crystalloid ; 250cc albumin; 2 plt/4 FFP/ 1000 FEIBA    arrived to ICU on primacor/Epi/Vasopressin   CI 3.5    patient woke post-op - following simple commands      PMHx includes but is not limited to:   HTN (hypertension)  HLD (hyperlipidemia)  Smoker  DM    ICU Vital Signs Last 24 Hrs  T(C): 36.8 (25 May 2022 05:01), Max: 36.8 (25 May 2022 05:01)  T(F): 98.3 (25 May 2022 05:01), Max: 98.3 (25 May 2022 05:01)  HR: 68 (25 May 2022 08:00) (60 - 80) sinus   ABP: 154/58 (25 May 2022 08:00) (106/57 - 154/58)  ABP(mean): 79 (25 May 2022 08:00) (75 - 98)  RR: 20 (25 May 2022 07:00) (14 - 20)  SpO2: 99% (25 May 2022 08:00) (96% - 100%)    Qtts:   Primacor 0.125  Cardene 12.5     I&O's Summary    24 May 2022 07:01  -  25 May 2022 07:00  --------------------------------------------------------  IN: 1518 mL / OUT: 2865 mL / NET: -1347 mL    25 May 2022 07:01  -  25 May 2022 08:38  --------------------------------------------------------  IN: 408.5 mL / OUT: 70 mL / NET: 338.5 mL    Vent settings: AC 16/500/40/5    Physical Exam    Heart - regular (-)rub/gallop  Lungs - BS appreciated bilaterally - no rhonchi/wheeze  Abd - (+)BS soft NTND (-)r/r/g  Ext  = warm to touch; no cyanosis/clubbing  Chest - op bandage in place   Neuro - alert/oriented and interactive - moving all extremities and following simple commands   Skin - no rash     LABS:                        8.3    5.71  )-----------( 157      ( 25 May 2022 02:08 )             24.5     05-25    143  |  109<H>  |  19  ----------------------------<  123<H>  4.3   |  23  |  1.85<H>    Ca    8.4      25 May 2022 02:08  Phos  2.9     05-25  Mg     2.9     05-25    TPro  6.4  /  Alb  3.4  /  TBili  0.6  /  DBili  x   /  AST  32  /  ALT  21  /  AlkPhos  62  05-25    PT/INR - ( 25 May 2022 02:08 )   PT: 15.3 sec;   INR: 1.28     pTT - ( 25 May 2022 02:08 )  PTT:25.6 sec    ABG - ( 25 May 2022 02:08 ) 7.45/34/105/99    RADIOLOGY & ADDITIONAL STUDIES: reviewed     Patient with acute onset chest pain - presented to OSH - initially treated as NSTEMI - persistent pain sxs after cath (-) found intramural hematoma - PARISH on ?CKD preop in light of medically necessary contrast studies - improved trend with IVF - taken to OR 5/24 for urgent procedure in light of findings - subacute on acute Type A aortic dissection - postop cardiogenic shock - improved    1. CV  stable hemodynamics at this time  remains on low dose primacor and cardene  CI 3.4  anticipate wean off primacor today   Cardene for BP control - anticipate will require multiple drugs preOp for Bp control - coreg/catapress/norvasc/imdur/nifedipine on preOp   sinus rhythm   complete periop Abx prophylaxis   ASA    2. Pulm   serial ABG to optimize oxygenation and ventilation   monitor chest tube output  PS trials now - propofol off anticipating same  precedex may be required as reported aggressive agitation preOp  hope to wean to extubate and liberate from vent this am     3. Neuro  patient denied ETOH/drug use when asked at presentation   UTOx (-)  thiamine/folate/MVI presumptively placed on   PreOp neuro assessment noted  monitor exam closely   Neuro following     4. Endocrine   insulin infusion per protocol   /114  HgA1c 5.1    DVT and GI prophylaxis    d/w patient/staff and CTS     I have spent/provided stated minutes of critical care time to this patient: 2 hour  '

## 2022-05-26 LAB
ALBUMIN SERPL ELPH-MCNC: 3.6 G/DL — SIGNIFICANT CHANGE UP (ref 3.3–5)
ALBUMIN SERPL ELPH-MCNC: 4 G/DL — SIGNIFICANT CHANGE UP (ref 3.3–5)
ALP SERPL-CCNC: 65 U/L — SIGNIFICANT CHANGE UP (ref 40–120)
ALP SERPL-CCNC: 71 U/L — SIGNIFICANT CHANGE UP (ref 40–120)
ALT FLD-CCNC: 6 U/L — LOW (ref 10–45)
ALT FLD-CCNC: 7 U/L — LOW (ref 10–45)
ANION GAP SERPL CALC-SCNC: 15 MMOL/L — SIGNIFICANT CHANGE UP (ref 5–17)
ANION GAP SERPL CALC-SCNC: 16 MMOL/L — SIGNIFICANT CHANGE UP (ref 5–17)
APTT BLD: 27.3 SEC — LOW (ref 27.5–35.5)
APTT BLD: 27.8 SEC — SIGNIFICANT CHANGE UP (ref 27.5–35.5)
AST SERPL-CCNC: 27 U/L — SIGNIFICANT CHANGE UP (ref 10–40)
AST SERPL-CCNC: 30 U/L — SIGNIFICANT CHANGE UP (ref 10–40)
BILIRUB SERPL-MCNC: 1 MG/DL — SIGNIFICANT CHANGE UP (ref 0.2–1.2)
BILIRUB SERPL-MCNC: 1.1 MG/DL — SIGNIFICANT CHANGE UP (ref 0.2–1.2)
BUN SERPL-MCNC: 28 MG/DL — HIGH (ref 7–23)
BUN SERPL-MCNC: 37 MG/DL — HIGH (ref 7–23)
CALCIUM SERPL-MCNC: 8.3 MG/DL — LOW (ref 8.4–10.5)
CALCIUM SERPL-MCNC: 8.9 MG/DL — SIGNIFICANT CHANGE UP (ref 8.4–10.5)
CHLORIDE SERPL-SCNC: 109 MMOL/L — HIGH (ref 96–108)
CHLORIDE SERPL-SCNC: 112 MMOL/L — HIGH (ref 96–108)
CO2 SERPL-SCNC: 18 MMOL/L — LOW (ref 22–31)
CO2 SERPL-SCNC: 19 MMOL/L — LOW (ref 22–31)
CREAT SERPL-MCNC: 2.43 MG/DL — HIGH (ref 0.5–1.3)
CREAT SERPL-MCNC: 2.87 MG/DL — HIGH (ref 0.5–1.3)
EGFR: 24 ML/MIN/1.73M2 — LOW
EGFR: 30 ML/MIN/1.73M2 — LOW
GAS PNL BLDA: SIGNIFICANT CHANGE UP
GAS PNL BLDA: SIGNIFICANT CHANGE UP
GLUCOSE BLDC GLUCOMTR-MCNC: 135 MG/DL — HIGH (ref 70–99)
GLUCOSE BLDC GLUCOMTR-MCNC: 169 MG/DL — HIGH (ref 70–99)
GLUCOSE BLDC GLUCOMTR-MCNC: 172 MG/DL — HIGH (ref 70–99)
GLUCOSE BLDC GLUCOMTR-MCNC: 190 MG/DL — HIGH (ref 70–99)
GLUCOSE SERPL-MCNC: 151 MG/DL — HIGH (ref 70–99)
GLUCOSE SERPL-MCNC: 190 MG/DL — HIGH (ref 70–99)
HCT VFR BLD CALC: 26.5 % — LOW (ref 39–50)
HCT VFR BLD CALC: 27.3 % — LOW (ref 39–50)
HGB BLD-MCNC: 8.9 G/DL — LOW (ref 13–17)
HGB BLD-MCNC: 9.3 G/DL — LOW (ref 13–17)
INR BLD: 1.42 — HIGH (ref 0.88–1.16)
INR BLD: 1.46 — HIGH (ref 0.88–1.16)
LACTATE SERPL-SCNC: 1.3 MMOL/L — SIGNIFICANT CHANGE UP (ref 0.5–2)
MAGNESIUM SERPL-MCNC: 2.6 MG/DL — SIGNIFICANT CHANGE UP (ref 1.6–2.6)
MAGNESIUM SERPL-MCNC: 2.8 MG/DL — HIGH (ref 1.6–2.6)
MCHC RBC-ENTMCNC: 29.4 PG — SIGNIFICANT CHANGE UP (ref 27–34)
MCHC RBC-ENTMCNC: 29.5 PG — SIGNIFICANT CHANGE UP (ref 27–34)
MCHC RBC-ENTMCNC: 33.6 GM/DL — SIGNIFICANT CHANGE UP (ref 32–36)
MCHC RBC-ENTMCNC: 34.1 GM/DL — SIGNIFICANT CHANGE UP (ref 32–36)
MCV RBC AUTO: 86.7 FL — SIGNIFICANT CHANGE UP (ref 80–100)
MCV RBC AUTO: 87.5 FL — SIGNIFICANT CHANGE UP (ref 80–100)
NRBC # BLD: 0 /100 WBCS — SIGNIFICANT CHANGE UP (ref 0–0)
NRBC # BLD: 0 /100 WBCS — SIGNIFICANT CHANGE UP (ref 0–0)
PHOSPHATE SERPL-MCNC: 3.5 MG/DL — SIGNIFICANT CHANGE UP (ref 2.5–4.5)
PHOSPHATE SERPL-MCNC: 4.3 MG/DL — SIGNIFICANT CHANGE UP (ref 2.5–4.5)
PLATELET # BLD AUTO: 180 K/UL — SIGNIFICANT CHANGE UP (ref 150–400)
PLATELET # BLD AUTO: 225 K/UL — SIGNIFICANT CHANGE UP (ref 150–400)
POTASSIUM SERPL-MCNC: 3.9 MMOL/L — SIGNIFICANT CHANGE UP (ref 3.5–5.3)
POTASSIUM SERPL-MCNC: 4.4 MMOL/L — SIGNIFICANT CHANGE UP (ref 3.5–5.3)
POTASSIUM SERPL-SCNC: 3.9 MMOL/L — SIGNIFICANT CHANGE UP (ref 3.5–5.3)
POTASSIUM SERPL-SCNC: 4.4 MMOL/L — SIGNIFICANT CHANGE UP (ref 3.5–5.3)
PROT SERPL-MCNC: 6.8 G/DL — SIGNIFICANT CHANGE UP (ref 6–8.3)
PROT SERPL-MCNC: 7.4 G/DL — SIGNIFICANT CHANGE UP (ref 6–8.3)
PROTHROM AB SERPL-ACNC: 16.9 SEC — HIGH (ref 10.5–13.4)
PROTHROM AB SERPL-ACNC: 17.4 SEC — HIGH (ref 10.5–13.4)
RBC # BLD: 3.03 M/UL — LOW (ref 4.2–5.8)
RBC # BLD: 3.15 M/UL — LOW (ref 4.2–5.8)
RBC # FLD: 14.4 % — SIGNIFICANT CHANGE UP (ref 10.3–14.5)
RBC # FLD: 14.5 % — SIGNIFICANT CHANGE UP (ref 10.3–14.5)
SODIUM SERPL-SCNC: 143 MMOL/L — SIGNIFICANT CHANGE UP (ref 135–145)
SODIUM SERPL-SCNC: 146 MMOL/L — HIGH (ref 135–145)
WBC # BLD: 12.51 K/UL — HIGH (ref 3.8–10.5)
WBC # BLD: 16.41 K/UL — HIGH (ref 3.8–10.5)
WBC # FLD AUTO: 12.51 K/UL — HIGH (ref 3.8–10.5)
WBC # FLD AUTO: 16.41 K/UL — HIGH (ref 3.8–10.5)

## 2022-05-26 PROCEDURE — 71045 X-RAY EXAM CHEST 1 VIEW: CPT | Mod: 26

## 2022-05-26 PROCEDURE — 99292 CRITICAL CARE ADDL 30 MIN: CPT

## 2022-05-26 PROCEDURE — 99291 CRITICAL CARE FIRST HOUR: CPT

## 2022-05-26 RX ORDER — SODIUM CHLORIDE 9 MG/ML
1000 INJECTION, SOLUTION INTRAVENOUS
Refills: 0 | Status: DISCONTINUED | OUTPATIENT
Start: 2022-05-26 | End: 2022-05-31

## 2022-05-26 RX ORDER — POTASSIUM CHLORIDE 20 MEQ
20 PACKET (EA) ORAL ONCE
Refills: 0 | Status: COMPLETED | OUTPATIENT
Start: 2022-05-26 | End: 2022-05-26

## 2022-05-26 RX ORDER — DEXTROSE 50 % IN WATER 50 %
25 SYRINGE (ML) INTRAVENOUS ONCE
Refills: 0 | Status: DISCONTINUED | OUTPATIENT
Start: 2022-05-26 | End: 2022-06-06

## 2022-05-26 RX ORDER — LABETALOL HCL 100 MG
10 TABLET ORAL
Refills: 0 | Status: DISCONTINUED | OUTPATIENT
Start: 2022-05-26 | End: 2022-05-27

## 2022-05-26 RX ORDER — ESMOLOL HCL 100MG/10ML
50 VIAL (ML) INTRAVENOUS
Qty: 2500 | Refills: 0 | Status: DISCONTINUED | OUTPATIENT
Start: 2022-05-26 | End: 2022-05-28

## 2022-05-26 RX ORDER — GLUCAGON INJECTION, SOLUTION 0.5 MG/.1ML
1 INJECTION, SOLUTION SUBCUTANEOUS ONCE
Refills: 0 | Status: DISCONTINUED | OUTPATIENT
Start: 2022-05-26 | End: 2022-06-06

## 2022-05-26 RX ORDER — DEXTROSE 50 % IN WATER 50 %
12.5 SYRINGE (ML) INTRAVENOUS ONCE
Refills: 0 | Status: DISCONTINUED | OUTPATIENT
Start: 2022-05-26 | End: 2022-06-06

## 2022-05-26 RX ORDER — INSULIN LISPRO 100/ML
VIAL (ML) SUBCUTANEOUS
Refills: 0 | Status: DISCONTINUED | OUTPATIENT
Start: 2022-05-26 | End: 2022-06-06

## 2022-05-26 RX ORDER — DEXTROSE 50 % IN WATER 50 %
15 SYRINGE (ML) INTRAVENOUS ONCE
Refills: 0 | Status: DISCONTINUED | OUTPATIENT
Start: 2022-05-26 | End: 2022-06-06

## 2022-05-26 RX ORDER — PANTOPRAZOLE SODIUM 20 MG/1
40 TABLET, DELAYED RELEASE ORAL
Refills: 0 | Status: DISCONTINUED | OUTPATIENT
Start: 2022-05-26 | End: 2022-05-27

## 2022-05-26 RX ORDER — SODIUM BICARBONATE 1 MEQ/ML
50 SYRINGE (ML) INTRAVENOUS ONCE
Refills: 0 | Status: COMPLETED | OUTPATIENT
Start: 2022-05-26 | End: 2022-05-26

## 2022-05-26 RX ORDER — CALCIUM GLUCONATE 100 MG/ML
2 VIAL (ML) INTRAVENOUS ONCE
Refills: 0 | Status: COMPLETED | OUTPATIENT
Start: 2022-05-26 | End: 2022-05-26

## 2022-05-26 RX ADMIN — Medication 2: at 08:24

## 2022-05-26 RX ADMIN — SODIUM CHLORIDE 3 MILLILITER(S): 9 INJECTION INTRAMUSCULAR; INTRAVENOUS; SUBCUTANEOUS at 05:00

## 2022-05-26 RX ADMIN — FENTANYL CITRATE 25 MICROGRAM(S): 50 INJECTION INTRAVENOUS at 00:29

## 2022-05-26 RX ADMIN — Medication 100 MILLIGRAM(S): at 01:30

## 2022-05-26 RX ADMIN — Medication 24.5 MICROGRAM(S)/KG/MIN: at 22:17

## 2022-05-26 RX ADMIN — FENTANYL CITRATE 25 MICROGRAM(S): 50 INJECTION INTRAVENOUS at 06:53

## 2022-05-26 RX ADMIN — Medication 60 MILLIGRAM(S): at 06:47

## 2022-05-26 RX ADMIN — HEPARIN SODIUM 5000 UNIT(S): 5000 INJECTION INTRAVENOUS; SUBCUTANEOUS at 21:07

## 2022-05-26 RX ADMIN — NICARDIPINE HYDROCHLORIDE 25 MG/HR: 30 CAPSULE, EXTENDED RELEASE ORAL at 12:12

## 2022-05-26 RX ADMIN — Medication 2: at 11:52

## 2022-05-26 RX ADMIN — NICARDIPINE HYDROCHLORIDE 25 MG/HR: 30 CAPSULE, EXTENDED RELEASE ORAL at 21:51

## 2022-05-26 RX ADMIN — Medication 200 GRAM(S): at 06:00

## 2022-05-26 RX ADMIN — Medication 50 MILLIEQUIVALENT(S): at 18:03

## 2022-05-26 RX ADMIN — FENTANYL CITRATE 25 MICROGRAM(S): 50 INJECTION INTRAVENOUS at 06:56

## 2022-05-26 RX ADMIN — Medication 2: at 16:43

## 2022-05-26 RX ADMIN — Medication 100 MILLIGRAM(S): at 01:37

## 2022-05-26 RX ADMIN — MILRINONE LACTATE 3.06 MICROGRAM(S)/KG/MIN: 1 INJECTION, SOLUTION INTRAVENOUS at 18:55

## 2022-05-26 RX ADMIN — NICARDIPINE HYDROCHLORIDE 25 MG/HR: 30 CAPSULE, EXTENDED RELEASE ORAL at 17:29

## 2022-05-26 RX ADMIN — HEPARIN SODIUM 5000 UNIT(S): 5000 INJECTION INTRAVENOUS; SUBCUTANEOUS at 06:52

## 2022-05-26 RX ADMIN — FENTANYL CITRATE 25 MICROGRAM(S): 50 INJECTION INTRAVENOUS at 00:45

## 2022-05-26 RX ADMIN — SODIUM CHLORIDE 3 MILLILITER(S): 9 INJECTION INTRAMUSCULAR; INTRAVENOUS; SUBCUTANEOUS at 21:24

## 2022-05-26 RX ADMIN — SODIUM CHLORIDE 3 MILLILITER(S): 9 INJECTION INTRAMUSCULAR; INTRAVENOUS; SUBCUTANEOUS at 14:00

## 2022-05-26 RX ADMIN — HEPARIN SODIUM 5000 UNIT(S): 5000 INJECTION INTRAVENOUS; SUBCUTANEOUS at 15:14

## 2022-05-26 RX ADMIN — Medication 100 MILLIEQUIVALENT(S): at 06:56

## 2022-05-26 RX ADMIN — Medication 1 PATCH: at 18:52

## 2022-05-26 RX ADMIN — NICARDIPINE HYDROCHLORIDE 25 MG/HR: 30 CAPSULE, EXTENDED RELEASE ORAL at 06:51

## 2022-05-26 RX ADMIN — Medication 1 PATCH: at 06:25

## 2022-05-26 NOTE — DIETITIAN INITIAL EVALUATION ADULT - PERTINENT LABORATORY DATA
05-26    143  |  109<H>  |  28<H>  ----------------------------<  151<H>  3.9   |  18<L>  |  2.43<H>    Ca    8.3<L>      26 May 2022 03:35  Phos  4.3     05-26  Mg     2.6     05-26    TPro  6.8  /  Alb  3.6  /  TBili  1.0  /  DBili  x   /  AST  30  /  ALT  6<L>  /  AlkPhos  65  05-26  POCT Blood Glucose.: 190 mg/dL (05-26-22 @ 11:26)  A1C with Estimated Average Glucose Result: 5.1 % (05-23-22 @ 03:35)  A1C with Estimated Average Glucose Result: 4.9 % (05-22-22 @ 22:36)

## 2022-05-26 NOTE — DIETITIAN INITIAL EVALUATION ADULT - NSFNSGIIOFT_GEN_A_CORE
05-25-22 @ 07:01  -  05-26-22 @ 07:00  --------------------------------------------------------  OUT:    Chest Tube (mL): 570 mL  Total OUT: 570 mL    Total NET: -570 mL      05-26-22 @ 07:01  -  05-26-22 @ 14:49  --------------------------------------------------------  OUT:    Chest Tube (mL): 160 mL  Total OUT: 160 mL    Total NET: -160 mL

## 2022-05-26 NOTE — PROGRESS NOTE ADULT - SUBJECTIVE AND OBJECTIVE BOX
CTICU  CRITICAL  CARE  attending     Hand off received 					   Pertinent clinical, laboratory, radiographic, hemodynamic, echocardiographic, respiratory data, microbiologic data and chart were reviewed and analyzed frequently throughout the course of the day and night  Patient seen and examined with CTS/ SH attending at bedside  Pt is a 61y , Male, HEALTH ISSUES - PROBLEM Dx:      , FAMILY HISTORY:  No pertinent family history in first degree relatives    PAST MEDICAL & SURGICAL HISTORY:  HTN (hypertension)      HLD (hyperlipidemia)      Smoker      No significant past surgical history        Patient is a 61y old  Male who presents with a chief complaint of Intramural hematoma (27 May 2022 11:39)      14 system review was unremarkable    Vital signs, hemodynamic and respiratory parameters were reviewed from the bedside nursing flowsheet.  ICU Vital Signs Last 24 Hrs  T(C): 36.9 (27 May 2022 18:52), Max: 37.5 (27 May 2022 05:01)  T(F): 98.4 (27 May 2022 18:52), Max: 99.5 (27 May 2022 05:01)  HR: 65 (27 May 2022 18:00) (59 - 73)  BP: 146/78 (27 May 2022 17:00) (115/66 - 149/70)  BP(mean): 101 (27 May 2022 17:00) (85 - 101)  ABP: 148/70 (27 May 2022 18:00) (119/66 - 189/75)  ABP(mean): 93 (27 May 2022 18:00) (78 - 104)  RR: 16 (27 May 2022 18:00) (16 - 22)  SpO2: 99% (27 May 2022 18:00) (89% - 100%)    Adult Advanced Hemodynamics Last 24 Hrs  CVP(mm Hg): 16 (27 May 2022 18:00) (3 - 276)  CVP(cm H2O): --  CO: --  CI: --  PA: --  PA(mean): --  PCWP: --  SVR: --  SVRI: --  PVR: --  PVRI: --, ABG - ( 27 May 2022 17:48 )  pH, Arterial: 7.45  pH, Blood: x     /  pCO2: 33    /  pO2: 114   / HCO3: 23    / Base Excess: -0.4  /  SaO2: 100.0               Intake and output was reviewed and the fluid balance was calculated  Daily     Daily   I&O's Summary    26 May 2022 07:01  -  27 May 2022 07:00  --------------------------------------------------------  IN: 2286 mL / OUT: 2990 mL / NET: -704 mL    27 May 2022 07:01  -  27 May 2022 18:53  --------------------------------------------------------  IN: 552 mL / OUT: 1845 mL / NET: -1293 mL        All lines and drain sites were assessed  [facility]      POCT Blood Glucose.: 131 mg/dL (27 May 2022 17:58)    No acute change in mental status  Auscultation of the chest reveals equal bs  Abdomen is soft  Extremities are warm and well perfused  Wounds appear clean and unremarkable  Antibiotics are periop    labs  CBC Full  -  ( 27 May 2022 17:55 )  WBC Count : 11.17 K/uL  RBC Count : 2.85 M/uL  Hemoglobin : 8.6 g/dL  Hematocrit : 25.3 %  Platelet Count - Automated : 224 K/uL  Mean Cell Volume : 88.8 fl  Mean Cell Hemoglobin : 30.2 pg  Mean Cell Hemoglobin Concentration : 34.0 gm/dL  Auto Neutrophil # : x  Auto Lymphocyte # : x  Auto Monocyte # : x  Auto Eosinophil # : x  Auto Basophil # : x  Auto Neutrophil % : x  Auto Lymphocyte % : x  Auto Monocyte % : x  Auto Eosinophil % : x  Auto Basophil % : x    05-27    146<H>  |  113<H>  |  39<H>  ----------------------------<  131<H>  3.7   |  22  |  2.58<H>    Ca    8.4      27 May 2022 17:55  Phos  3.8     05-27  Mg     2.5     05-27    TPro  6.4  /  Alb  3.3  /  TBili  0.8  /  DBili  x   /  AST  21  /  ALT  7<L>  /  AlkPhos  67  05-27    PT/INR - ( 27 May 2022 17:55 )   PT: 17.7 sec;   INR: 1.48          PTT - ( 27 May 2022 17:55 )  PTT:25.8 sec  The current medications were reviewed   MEDICATIONS  (STANDING):  aspirin  chewable 81 milliGRAM(s) Oral daily  chlorhexidine 2% Cloths 1 Application(s) Topical daily  dextrose 5%. 1000 milliLiter(s) (100 mL/Hr) IV Continuous <Continuous>  dextrose 5%. 1000 milliLiter(s) (50 mL/Hr) IV Continuous <Continuous>  dextrose 50% Injectable 25 Gram(s) IV Push once  dextrose 50% Injectable 25 Gram(s) IV Push once  dextrose 50% Injectable 12.5 Gram(s) IV Push once  esMOLOL  Infusion 50 MICROgram(s)/kG/Min (24.5 mL/Hr) IV Continuous <Continuous>  folic acid 1 milliGRAM(s) Oral daily  furosemide   Injectable 60 milliGRAM(s) IV Push daily  glucagon  Injectable 1 milliGRAM(s) IntraMuscular once  heparin   Injectable 5000 Unit(s) SubCutaneous every 8 hours  insulin lispro (ADMELOG) corrective regimen sliding scale   SubCutaneous Before meals and at bedtime  labetalol 300 milliGRAM(s) Oral every 6 hours  milrinone Infusion 0.125 MICROgram(s)/kG/Min (3.06 mL/Hr) IV Continuous <Continuous>  multivitamin 1 Tablet(s) Oral daily  niCARdipine Infusion 5 mG/Hr (25 mL/Hr) IV Continuous <Continuous>  pantoprazole    Tablet 40 milliGRAM(s) Oral before breakfast  sodium chloride 0.9% lock flush 3 milliLiter(s) IV Push every 8 hours  sodium chloride 0.9%. 1000 milliLiter(s) (10 mL/Hr) IV Continuous <Continuous>  thiamine 100 milliGRAM(s) Oral daily    MEDICATIONS  (PRN):  acetaminophen     Tablet .. 650 milliGRAM(s) Oral every 6 hours PRN Mild Pain (1 - 3)  dextrose Oral Gel 15 Gram(s) Oral once PRN Blood Glucose LESS THAN 70 milliGRAM(s)/deciliter  fentaNYL    Injectable 25 MICROGram(s) IV Push every 3 hours PRN Severe Pain (7 - 10)       PROBLEM LIST/ ASSESSMENT:  HEALTH ISSUES - PROBLEM Dx:      ,   Patient is a 61y old  Male who presents with a chief complaint of Intramural hematoma (27 May 2022 11:39)     s/p cardiac surgery    Acute respiratory failure ruled in due to hypoxemia, O2 sats < 91% on RA treated with HFNC      Toxic metabolic encephalopathy ; sundowning due to anesthesia pain medications    Acidosis evidenced by anion gap and negative base excess              My plan includes :  close hemodynamic, ventilatory and drain monitoring and management per post op routine    Monitor for arrhythmias and monitor parameters for organ perfusion  beta blockade not administered due to hemodynamic instability and bradycardia  monitor neurologic status  Head of the bed should remain elevated to 45 deg .   chest PT and IS will be encouraged  monitor adequacy of oxygenation and ventilation and attempt to wean oxygen  antibiotic regimen will be tailored to the clinical, laboratory and microbiologic data  Nutritional goals will be met using po eventually , ensure adequate caloric intake and montior the same  Stress ulcer and VTE prophylaxis will be achieved    Glycemic control is satisfactory  Electrolytes have been repleted as necessary and wound care has been carried out. Pain control has been achieved.   agressive physical therapy and early mobility and ambulation goals will be met   The family was updated about the course and plan  CRITICAL CARE TIME personally provided by me  in evaluation and management, reassessments, review and interpretation of labs and x-rays, ventilator and hemodynamic management, formulating a plan and coordinating care: ___120____ MIN.  Time does not include procedural time.  CTICU ATTENDING     					    Rj Prado MD

## 2022-05-26 NOTE — SWALLOW FEES ASSESSMENT ADULT - ADDITIONAL RECOMMENDATIONS
Do not anticipate a significant change in swallow function until pt is stronger overall. Will follow-up as appropriate. Please reconsult if pt appears much improved prior to our [facility] [place].

## 2022-05-26 NOTE — OCCUPATIONAL THERAPY INITIAL EVALUATION ADULT - ADDITIONAL COMMENTS
Pt is a unreliable historian, sandra Pt is an unreliable historian, as pt would require frequent re-direction to questioning as pt would answer some and then stop responding. Pt stated that he lives alone in an apartment, has stairs to enter but unable to recall how many. Reports that he was independent in ADLs, when asked if patient uses cane or walker, pt stated "yes." When asked if he doesn't need cane pt also stated "Yes" Pt is an unreliable historian, as pt would require frequent re-direction to questioning as pt would answer some and then appear distracted and stop responding. Pt stated that he lives alone in an apartment, has stairs to enter but unable to recall how many. Reports that he was independent in ADLs, when asked if patient uses cane or walker, pt stated "yes." When asked if he doesn't need cane pt also stated "Yes"

## 2022-05-26 NOTE — OCCUPATIONAL THERAPY INITIAL EVALUATION ADULT - PERTINENT HX OF CURRENT PROBLEM, REHAB EVAL
Patient is a 61 year old male admitted to outside hospital with CP on 5/16/2022 , cardiac cath shows non-obstructive CAD, EF without valvulopathy and normal EF, upon discharge planning (5/21/2022) patient found to be febrile and underwent infectious work-up.  CT chest shows thoracic aortic changes. Patient transferred to [facility] under the care of Dr. Ford for surgical evaluation.

## 2022-05-26 NOTE — OCCUPATIONAL THERAPY INITIAL EVALUATION ADULT - MODIFIED CLINICAL TEST OF SENSORY INTEGRATION IN BALANCE TEST
Pt requiring minx2 assist to ambulate with RW ~15', with chair follow +additional person for line management, pt demo flexed head down posture and decreased step length.

## 2022-05-26 NOTE — SWALLOW FEES ASSESSMENT ADULT - MODE OF PRESENTATION
~3oz of thin liq by spoon/cup, ~3oz of mildly thick liquids, 3 tsp of puree, 3 tsp of soft-and-bite-sized solids

## 2022-05-26 NOTE — SWALLOW FEES ASSESSMENT ADULT - PHARYNGEAL PHASE COMMENTS
With thin liquids, swallow was triggered after the bolus reached the pyriforms. Timeliness, coordination and strength of pharyngeal squeeze worsened as trials progressed. This resulted in SILENT aspiration during the swallow with thin liquids. Cued cough was not strong enough to clear the aspiration. Inconsistent trace penetration with other consistencies.

## 2022-05-26 NOTE — OCCUPATIONAL THERAPY INITIAL EVALUATION ADULT - MODALITIES TREATMENT COMMENTS
Unable to assess CNs, as pt having difficulty attending to Unable to assess CNs, as pt having difficulty attending and following directions. Pt was able to turn head R<>L, shrug B shoulders, no facial droop noted, symmetrical smile with pt able to demo tongue protrusion midline. Unable to follow directions to accurately assess vision.

## 2022-05-26 NOTE — SWALLOW FEES ASSESSMENT ADULT - DIAGNOSTIC IMPRESSIONS
Pt p/w oropharyngeal dysphagia characterized by anterior loss, worsening coordination, decreased sensation, and decreased pharyngeal squeeze as pt became increasingly fatigued as the study progressed, resulting in SILENT aspiration w thin liquids and worsening post-deglutitive residue w chewable solids. Although no yazmin aspiration was noted during this study w puree and mildly thick liquids, given downtrending O2 saturation while on nasal cannula, there is concern w ability to tolerate PO over the course of a meal. Discussed case w PA. Pt p/w oropharyngeal dysphagia characterized by anterior loss, worsening coordination, decreased sensation, and decreased pharyngeal squeeze as pt became increasingly fatigued as the study progressed, resulting in SILENT aspiration w thin liquids and worsening post-deglutitive residue (worst with chewable solids). Although no yazmin aspiration was noted during this study w puree and mildly thick liquids, given downtrending O2 saturation while on nasal cannula, significant fatigue even with the limited amounts of PO presented during this study, and observed decreased coordination as fatigue worsened, there is concern w ability to tolerate PO over the course of a meal. Discussed case w PA.

## 2022-05-26 NOTE — SWALLOW FEES ASSESSMENT ADULT - COMMENTS
FEES order received. PA provided verbal clearance to proceed w study. Pt was received sitting in chair at bedside w HFNC ( w O2 sat at 95%). RN changed HFNC to 6L of O2 via nasal cannula based on MD directive for this study. O2 was noted to decrease to 93-94%. As study progressed, pt appeared increasingly fatigued and was not able to keep his head up. O2 sat decreased to 90-91%. Full visualization of entire length of TVFs was limited during this study, but B/L TVF movement noted on phonation.

## 2022-05-26 NOTE — DIETITIAN INITIAL EVALUATION ADULT - PERTINENT MEDS FT
MEDICATIONS  (STANDING):  aspirin  chewable 81 milliGRAM(s) Oral daily  chlorhexidine 2% Cloths 1 Application(s) Topical daily  dextrose 5%. 1000 milliLiter(s) (100 mL/Hr) IV Continuous <Continuous>  dextrose 5%. 1000 milliLiter(s) (50 mL/Hr) IV Continuous <Continuous>  dextrose 50% Injectable 25 Gram(s) IV Push once  dextrose 50% Injectable 12.5 Gram(s) IV Push once  dextrose 50% Injectable 25 Gram(s) IV Push once  folic acid 1 milliGRAM(s) Oral daily  furosemide   Injectable 60 milliGRAM(s) IV Push daily  glucagon  Injectable 1 milliGRAM(s) IntraMuscular once  heparin   Injectable 5000 Unit(s) SubCutaneous every 8 hours  insulin lispro (ADMELOG) corrective regimen sliding scale   SubCutaneous Before meals and at bedtime  milrinone Infusion 0.125 MICROgram(s)/kG/Min (3.06 mL/Hr) IV Continuous <Continuous>  multivitamin 1 Tablet(s) Oral daily  niCARdipine Infusion 5 mG/Hr (25 mL/Hr) IV Continuous <Continuous>  pantoprazole    Tablet 40 milliGRAM(s) Oral before breakfast  sodium chloride 0.9% lock flush 3 milliLiter(s) IV Push every 8 hours  sodium chloride 0.9%. 1000 milliLiter(s) (10 mL/Hr) IV Continuous <Continuous>  thiamine 100 milliGRAM(s) Oral daily    MEDICATIONS  (PRN):  acetaminophen     Tablet .. 650 milliGRAM(s) Oral every 6 hours PRN Mild Pain (1 - 3)  dextrose Oral Gel 15 Gram(s) Oral once PRN Blood Glucose LESS THAN 70 milliGRAM(s)/deciliter  fentaNYL    Injectable 25 MICROGram(s) IV Push every 3 hours PRN Severe Pain (7 - 10)

## 2022-05-26 NOTE — DIETITIAN INITIAL EVALUATION ADULT - ADD RECOMMEND
1. Continue NPO pending FEES; once cleared for PO diet, recommend DASH/TLC Diet 2. Encourage pt to meet nutritional needs as able 3. RD to provide diet ed as able 4. Pain and bowel regimen per team 5. Continue micronutrient supplementation as able

## 2022-05-26 NOTE — OCCUPATIONAL THERAPY INITIAL EVALUATION ADULT - RUE MMT, REHAB EVAL
sternal precautions, demo ~3/5 for shoulder flex/ext, elbow flex/ext, and ~4/5 for  strength/grossly assessed due to

## 2022-05-26 NOTE — OCCUPATIONAL THERAPY INITIAL EVALUATION ADULT - GENERAL OBSERVATIONS, REHAB EVAL
Pt received semi-supine in bed, +tele, +central line, +TPM, +6L O2 NC, +blakes x3, +britt, +CT (to suction), in NAD and agreeable to OT. Cleared by TORI Grissom and MD Prado to see. Pt received seated in chair, +tele, +central line, +TPM, +6L O2 NC, +blakes x3, +britt, +CT (to suction), in NAD and agreeable to OT. Cleared by TORI Grissom and MD Prado to see.

## 2022-05-26 NOTE — OCCUPATIONAL THERAPY INITIAL EVALUATION ADULT - DIAGNOSIS, OT EVAL
Pt is s/p Reconstruction of aortic root and Replacement of ascending aorta and hemiarch (5/24) presents with impaired balance, decreased activity tolerance, generalized deconditioning, and cognitive deficits (attention, recall, safety) impacting overall ease of completing ADLs and functional mobility tasks.

## 2022-05-26 NOTE — SWALLOW FEES ASSESSMENT ADULT - RECOMMENDED CONSISTENCY
If pt's respiratory status is tenuous and GOC are to avoid all risk of prandial aspiration, recommend NPO w short-term alternative means of nutrition/hydration/medication. Recommend allowance of ice-chips for swallow practice when breathing comfortably, sitting upright, and after oral care has been provided.     If pt is deemed by medical team to be able to tolerate some risk of prandial aspiration, can consider SMALL tastes of puree/mildly thick liquids when on nasal cannula for comfort and swallow practice. Please employ STRICT aspiration precautions. However, pt will likely be unable to meet body's caloric needs by mouth given the degree of fatigue exhibited during this study, and will still require a short-term alternative means of nutrition/hydration/medication.

## 2022-05-26 NOTE — DIETITIAN INITIAL EVALUATION ADULT - OTHER INFO
61 year old male with PMHx of HTN, HLD, Current smoker (30 pack year history), admitted to outside hospital with CP on 5/16/2022 , cardiac cath shows non-obstructive CAD, EF without valvulopathy and normal EF, upon discharge (5/21/2022) patient found to be febrile and underwent infectious work-up. CT chest shows thoracic aortic changes with follow up CTA chest showing chronic thrombosed dissection versus intramural hematoma along the thoracic aorta, small pericardial effusion and left pleural effusion vs hemothorax. Patient transferred to [facility] under the care of Dr. Ford for surgical evaluation.  Upon arrival patient in no acute distress and denies all pain, patient states only has history of HTN but has been off medication for 6 months as he was unable to refill his medications.  Now s/p AVR 5/24.       Pt seen at bedside for initial assessment- pt on NC w/ humidification. Last documented bowel movement 5/21. Confirms NKFA. Denies change in appetite PTA; endorses 3 meals/day at home Dosing weight 179 pounds. Pt verbalizes no recent weight loss. No edema documented at this time. No pressure ulcers documented at this time. MSI incision per chart. Crispin score=15. Labs reviewed 5/26; elevated BUN/Cr. Fingersticks 5/25-5/26:  mg/dL; ISS ordered. Observed pt with no overt signs of muscle or fat wasting. Based on ASPEN guidelines, pt does not meet criteria for malnutrition at this time. RD unable to provide diet ed at this time as pt reported feeling difficulty breathing thus limited interest in RD interview. Of note, pt failed SLP evaluation and SLP unable to complete FEES today due to difficulty w/ oxygenation. Made aware RD remains available. RD to follow up per protocol. See nutrition recommendations below.

## 2022-05-27 LAB
ALBUMIN SERPL ELPH-MCNC: 3.3 G/DL — SIGNIFICANT CHANGE UP (ref 3.3–5)
ALP SERPL-CCNC: 61 U/L — SIGNIFICANT CHANGE UP (ref 40–120)
ALP SERPL-CCNC: 64 U/L — SIGNIFICANT CHANGE UP (ref 40–120)
ALP SERPL-CCNC: 67 U/L — SIGNIFICANT CHANGE UP (ref 40–120)
ALT FLD-CCNC: 6 U/L — LOW (ref 10–45)
ALT FLD-CCNC: 6 U/L — LOW (ref 10–45)
ALT FLD-CCNC: 7 U/L — LOW (ref 10–45)
ANION GAP SERPL CALC-SCNC: 11 MMOL/L — SIGNIFICANT CHANGE UP (ref 5–17)
ANION GAP SERPL CALC-SCNC: 11 MMOL/L — SIGNIFICANT CHANGE UP (ref 5–17)
ANION GAP SERPL CALC-SCNC: 12 MMOL/L — SIGNIFICANT CHANGE UP (ref 5–17)
APTT BLD: 25.8 SEC — LOW (ref 27.5–35.5)
APTT BLD: 26.8 SEC — LOW (ref 27.5–35.5)
APTT BLD: 27.8 SEC — SIGNIFICANT CHANGE UP (ref 27.5–35.5)
AST SERPL-CCNC: 21 U/L — SIGNIFICANT CHANGE UP (ref 10–40)
AST SERPL-CCNC: 23 U/L — SIGNIFICANT CHANGE UP (ref 10–40)
AST SERPL-CCNC: 25 U/L — SIGNIFICANT CHANGE UP (ref 10–40)
BILIRUB SERPL-MCNC: 0.8 MG/DL — SIGNIFICANT CHANGE UP (ref 0.2–1.2)
BILIRUB SERPL-MCNC: 0.9 MG/DL — SIGNIFICANT CHANGE UP (ref 0.2–1.2)
BILIRUB SERPL-MCNC: 0.9 MG/DL — SIGNIFICANT CHANGE UP (ref 0.2–1.2)
BUN SERPL-MCNC: 39 MG/DL — HIGH (ref 7–23)
BUN SERPL-MCNC: 40 MG/DL — HIGH (ref 7–23)
BUN SERPL-MCNC: 43 MG/DL — HIGH (ref 7–23)
CALCIUM SERPL-MCNC: 8.4 MG/DL — SIGNIFICANT CHANGE UP (ref 8.4–10.5)
CHLORIDE SERPL-SCNC: 113 MMOL/L — HIGH (ref 96–108)
CHLORIDE SERPL-SCNC: 114 MMOL/L — HIGH (ref 96–108)
CHLORIDE SERPL-SCNC: 115 MMOL/L — HIGH (ref 96–108)
CO2 SERPL-SCNC: 22 MMOL/L — SIGNIFICANT CHANGE UP (ref 22–31)
CREAT SERPL-MCNC: 2.58 MG/DL — HIGH (ref 0.5–1.3)
CREAT SERPL-MCNC: 2.77 MG/DL — HIGH (ref 0.5–1.3)
CREAT SERPL-MCNC: 2.81 MG/DL — HIGH (ref 0.5–1.3)
EGFR: 25 ML/MIN/1.73M2 — LOW
EGFR: 25 ML/MIN/1.73M2 — LOW
EGFR: 27 ML/MIN/1.73M2 — LOW
GAS PNL BLDA: SIGNIFICANT CHANGE UP
GLUCOSE BLDC GLUCOMTR-MCNC: 122 MG/DL — HIGH (ref 70–99)
GLUCOSE BLDC GLUCOMTR-MCNC: 131 MG/DL — HIGH (ref 70–99)
GLUCOSE BLDC GLUCOMTR-MCNC: 157 MG/DL — HIGH (ref 70–99)
GLUCOSE SERPL-MCNC: 131 MG/DL — HIGH (ref 70–99)
GLUCOSE SERPL-MCNC: 149 MG/DL — HIGH (ref 70–99)
GLUCOSE SERPL-MCNC: 149 MG/DL — HIGH (ref 70–99)
HCT VFR BLD CALC: 24.6 % — LOW (ref 39–50)
HCT VFR BLD CALC: 24.8 % — LOW (ref 39–50)
HCT VFR BLD CALC: 25.3 % — LOW (ref 39–50)
HGB BLD-MCNC: 8.4 G/DL — LOW (ref 13–17)
HGB BLD-MCNC: 8.4 G/DL — LOW (ref 13–17)
HGB BLD-MCNC: 8.6 G/DL — LOW (ref 13–17)
INR BLD: 1.48 — HIGH (ref 0.88–1.16)
INR BLD: 1.51 — HIGH (ref 0.88–1.16)
INR BLD: 1.51 — HIGH (ref 0.88–1.16)
MAGNESIUM SERPL-MCNC: 2.5 MG/DL — SIGNIFICANT CHANGE UP (ref 1.6–2.6)
MAGNESIUM SERPL-MCNC: 2.6 MG/DL — SIGNIFICANT CHANGE UP (ref 1.6–2.6)
MAGNESIUM SERPL-MCNC: 2.6 MG/DL — SIGNIFICANT CHANGE UP (ref 1.6–2.6)
MCHC RBC-ENTMCNC: 29.9 PG — SIGNIFICANT CHANGE UP (ref 27–34)
MCHC RBC-ENTMCNC: 30 PG — SIGNIFICANT CHANGE UP (ref 27–34)
MCHC RBC-ENTMCNC: 30.2 PG — SIGNIFICANT CHANGE UP (ref 27–34)
MCHC RBC-ENTMCNC: 33.9 GM/DL — SIGNIFICANT CHANGE UP (ref 32–36)
MCHC RBC-ENTMCNC: 34 GM/DL — SIGNIFICANT CHANGE UP (ref 32–36)
MCHC RBC-ENTMCNC: 34.1 GM/DL — SIGNIFICANT CHANGE UP (ref 32–36)
MCV RBC AUTO: 87.9 FL — SIGNIFICANT CHANGE UP (ref 80–100)
MCV RBC AUTO: 88.3 FL — SIGNIFICANT CHANGE UP (ref 80–100)
MCV RBC AUTO: 88.8 FL — SIGNIFICANT CHANGE UP (ref 80–100)
NRBC # BLD: 0 /100 WBCS — SIGNIFICANT CHANGE UP (ref 0–0)
PHOSPHATE SERPL-MCNC: 3.3 MG/DL — SIGNIFICANT CHANGE UP (ref 2.5–4.5)
PHOSPHATE SERPL-MCNC: 3.4 MG/DL — SIGNIFICANT CHANGE UP (ref 2.5–4.5)
PHOSPHATE SERPL-MCNC: 3.8 MG/DL — SIGNIFICANT CHANGE UP (ref 2.5–4.5)
PLATELET # BLD AUTO: 191 K/UL — SIGNIFICANT CHANGE UP (ref 150–400)
PLATELET # BLD AUTO: 192 K/UL — SIGNIFICANT CHANGE UP (ref 150–400)
PLATELET # BLD AUTO: 224 K/UL — SIGNIFICANT CHANGE UP (ref 150–400)
POTASSIUM SERPL-MCNC: 3.7 MMOL/L — SIGNIFICANT CHANGE UP (ref 3.5–5.3)
POTASSIUM SERPL-MCNC: 4 MMOL/L — SIGNIFICANT CHANGE UP (ref 3.5–5.3)
POTASSIUM SERPL-MCNC: 4 MMOL/L — SIGNIFICANT CHANGE UP (ref 3.5–5.3)
POTASSIUM SERPL-SCNC: 3.7 MMOL/L — SIGNIFICANT CHANGE UP (ref 3.5–5.3)
POTASSIUM SERPL-SCNC: 4 MMOL/L — SIGNIFICANT CHANGE UP (ref 3.5–5.3)
POTASSIUM SERPL-SCNC: 4 MMOL/L — SIGNIFICANT CHANGE UP (ref 3.5–5.3)
PROT SERPL-MCNC: 6.3 G/DL — SIGNIFICANT CHANGE UP (ref 6–8.3)
PROT SERPL-MCNC: 6.4 G/DL — SIGNIFICANT CHANGE UP (ref 6–8.3)
PROT SERPL-MCNC: 6.4 G/DL — SIGNIFICANT CHANGE UP (ref 6–8.3)
PROTHROM AB SERPL-ACNC: 17.7 SEC — HIGH (ref 10.5–13.4)
PROTHROM AB SERPL-ACNC: 18 SEC — HIGH (ref 10.5–13.4)
PROTHROM AB SERPL-ACNC: 18.1 SEC — HIGH (ref 10.5–13.4)
RBC # BLD: 2.8 M/UL — LOW (ref 4.2–5.8)
RBC # BLD: 2.81 M/UL — LOW (ref 4.2–5.8)
RBC # BLD: 2.85 M/UL — LOW (ref 4.2–5.8)
RBC # FLD: 14.2 % — SIGNIFICANT CHANGE UP (ref 10.3–14.5)
RBC # FLD: 14.2 % — SIGNIFICANT CHANGE UP (ref 10.3–14.5)
RBC # FLD: 14.5 % — SIGNIFICANT CHANGE UP (ref 10.3–14.5)
SODIUM SERPL-SCNC: 146 MMOL/L — HIGH (ref 135–145)
SODIUM SERPL-SCNC: 148 MMOL/L — HIGH (ref 135–145)
SODIUM SERPL-SCNC: 148 MMOL/L — HIGH (ref 135–145)
WBC # BLD: 11.17 K/UL — HIGH (ref 3.8–10.5)
WBC # BLD: 11.76 K/UL — HIGH (ref 3.8–10.5)
WBC # BLD: 12.6 K/UL — HIGH (ref 3.8–10.5)
WBC # FLD AUTO: 11.17 K/UL — HIGH (ref 3.8–10.5)
WBC # FLD AUTO: 11.76 K/UL — HIGH (ref 3.8–10.5)
WBC # FLD AUTO: 12.6 K/UL — HIGH (ref 3.8–10.5)

## 2022-05-27 PROCEDURE — 71045 X-RAY EXAM CHEST 1 VIEW: CPT | Mod: 26,77

## 2022-05-27 PROCEDURE — 99291 CRITICAL CARE FIRST HOUR: CPT

## 2022-05-27 PROCEDURE — 71045 X-RAY EXAM CHEST 1 VIEW: CPT | Mod: 26

## 2022-05-27 PROCEDURE — 99233 SBSQ HOSP IP/OBS HIGH 50: CPT

## 2022-05-27 RX ORDER — HALOPERIDOL DECANOATE 100 MG/ML
5 INJECTION INTRAMUSCULAR ONCE
Refills: 0 | Status: COMPLETED | OUTPATIENT
Start: 2022-05-27 | End: 2022-05-27

## 2022-05-27 RX ORDER — PANTOPRAZOLE SODIUM 20 MG/1
40 TABLET, DELAYED RELEASE ORAL DAILY
Refills: 0 | Status: DISCONTINUED | OUTPATIENT
Start: 2022-05-27 | End: 2022-05-30

## 2022-05-27 RX ORDER — LABETALOL HCL 100 MG
300 TABLET ORAL EVERY 6 HOURS
Refills: 0 | Status: DISCONTINUED | OUTPATIENT
Start: 2022-05-27 | End: 2022-05-28

## 2022-05-27 RX ADMIN — SODIUM CHLORIDE 3 MILLILITER(S): 9 INJECTION INTRAMUSCULAR; INTRAVENOUS; SUBCUTANEOUS at 21:30

## 2022-05-27 RX ADMIN — Medication 10 MILLIGRAM(S): at 02:31

## 2022-05-27 RX ADMIN — Medication 81 MILLIGRAM(S): at 10:50

## 2022-05-27 RX ADMIN — SODIUM CHLORIDE 3 MILLILITER(S): 9 INJECTION INTRAMUSCULAR; INTRAVENOUS; SUBCUTANEOUS at 14:25

## 2022-05-27 RX ADMIN — Medication 300 MILLIGRAM(S): at 10:44

## 2022-05-27 RX ADMIN — Medication 1 PATCH: at 18:02

## 2022-05-27 RX ADMIN — Medication 1 PATCH: at 07:03

## 2022-05-27 RX ADMIN — Medication 60 MILLIGRAM(S): at 05:38

## 2022-05-27 RX ADMIN — NICARDIPINE HYDROCHLORIDE 25 MG/HR: 30 CAPSULE, EXTENDED RELEASE ORAL at 00:23

## 2022-05-27 RX ADMIN — NICARDIPINE HYDROCHLORIDE 25 MG/HR: 30 CAPSULE, EXTENDED RELEASE ORAL at 05:38

## 2022-05-27 RX ADMIN — Medication 300 MILLIGRAM(S): at 17:14

## 2022-05-27 RX ADMIN — Medication 2: at 07:11

## 2022-05-27 RX ADMIN — HEPARIN SODIUM 5000 UNIT(S): 5000 INJECTION INTRAVENOUS; SUBCUTANEOUS at 16:24

## 2022-05-27 RX ADMIN — Medication 1 TABLET(S): at 10:44

## 2022-05-27 RX ADMIN — Medication 10 MILLIGRAM(S): at 08:01

## 2022-05-27 RX ADMIN — Medication 24.5 MICROGRAM(S)/KG/MIN: at 07:54

## 2022-05-27 RX ADMIN — HALOPERIDOL DECANOATE 5 MILLIGRAM(S): 100 INJECTION INTRAMUSCULAR at 00:26

## 2022-05-27 RX ADMIN — Medication 100 MILLIGRAM(S): at 17:14

## 2022-05-27 RX ADMIN — SODIUM CHLORIDE 3 MILLILITER(S): 9 INJECTION INTRAMUSCULAR; INTRAVENOUS; SUBCUTANEOUS at 06:12

## 2022-05-27 RX ADMIN — HEPARIN SODIUM 5000 UNIT(S): 5000 INJECTION INTRAVENOUS; SUBCUTANEOUS at 05:39

## 2022-05-27 RX ADMIN — NICARDIPINE HYDROCHLORIDE 25 MG/HR: 30 CAPSULE, EXTENDED RELEASE ORAL at 07:58

## 2022-05-27 RX ADMIN — Medication 10 MILLIGRAM(S): at 06:11

## 2022-05-27 RX ADMIN — Medication 1 MILLIGRAM(S): at 10:45

## 2022-05-27 NOTE — PROGRESS NOTE ADULT - SUBJECTIVE AND OBJECTIVE BOX
CTICU  CRITICAL  CARE  attending     Hand off received 					   Pertinent clinical, laboratory, radiographic, hemodynamic, echocardiographic, respiratory data, microbiologic data and chart were reviewed and analyzed frequently throughout the course of the day and night      61 year old male with HTN, HLD, Current smoker (30 pack year history), admitted to outside hospital with CP on 5/16/2022 ,   Cardiac cath showed non-obstructive CAD, EF without valvulopathy and normal EF.  On the day of discharge (5/21/2022) patient found to be febrile and underwent infectious work-up.   CT chest shows thoracic aortic changes with follow up CTA chest showing chronic thrombosed dissection versus intramural hematoma along the thoracic aorta.   There is small pericardial effusion and left pleural effusion vs hemothorax.  The patient was transferred to [facility] under the care of Dr. Ford for surgical evaluation.    S/P Aortic Root Reconstruction.  S/P Replacement of AA and Hemiarch.       FAMILY HISTORY:  No pertinent family history in first degree relatives    PAST MEDICAL & SURGICAL HISTORY:  HTN (hypertension)  HLD (hyperlipidemia)  Smoker  No significant past surgical history            14 system review was unremarkable    Vital signs, hemodynamic and respiratory parameters were reviewed from the bedside nursing flow sheet.  ICU Vital Signs Last 24 Hrs  T(C): 36.3 (27 May 2022 21:24), Max: 37.5 (27 May 2022 05:01)  T(F): 97.4 (27 May 2022 21:24), Max: 99.5 (27 May 2022 05:01)  HR: 67 (27 May 2022 21:00) (59 - 68)  BP: 134/72 (27 May 2022 21:00) (115/66 - 146/78)  BP(mean): 98 (27 May 2022 21:00) (85 - 105)  ABP: 167/77 (27 May 2022 21:00) (119/66 - 167/77)  ABP(mean): 103 (27 May 2022 21:00) (78 - 118)  RR: 18 (27 May 2022 21:00) (16 - 20)  SpO2: 96% (27 May 2022 21:00) (89% - 100%)    Adult Advanced Hemodynamics Last 24 Hrs  CVP(mm Hg): 19 (27 May 2022 21:00) (3 - 42)  CVP(cm H2O): --  CO: --  CI: --  PA: --  PA(mean): --  PCWP: --  SVR: --  SVRI: --  PVR: --  PVRI: --, ABG - ( 27 May 2022 17:48 )  pH, Arterial: 7.45  pH, Blood: x     /  pCO2: 33    /  pO2: 114   / HCO3: 23    / Base Excess: -0.4  /  SaO2: 100.0               Intake and output was reviewed and the fluid balance was calculated  Daily     Daily   I&O's Summary    26 May 2022 07:01  -  27 May 2022 07:00  --------------------------------------------------------  IN: 2286 mL / OUT: 2990 mL / NET: -704 mL    27 May 2022 07:01  -  27 May 2022 22:13  --------------------------------------------------------  IN: 632 mL / OUT: 2085 mL / NET: -1453 mL        All lines and drain sites were assessed    Neuro: No change in the mental status from the baseline. Follows commands. Moves all 4 extremities.  Neck: No JVD.  CVS: S1, S2, No S3.  Lungs: Good air entry bilaterally.  Abd: Soft. No tenderness. + Bowel sounds.  Vascular: + DP/PT.  Extremities: No edema.  Lymphatic: Normal.  Skin: No abnormalities.      labs  CBC Full  -  ( 27 May 2022 17:55 )  WBC Count : 11.17 K/uL  RBC Count : 2.85 M/uL  Hemoglobin : 8.6 g/dL  Hematocrit : 25.3 %  Platelet Count - Automated : 224 K/uL  Mean Cell Volume : 88.8 fl  Mean Cell Hemoglobin : 30.2 pg  Mean Cell Hemoglobin Concentration : 34.0 gm/dL  Auto Neutrophil # : x  Auto Lymphocyte # : x  Auto Monocyte # : x  Auto Eosinophil # : x  Auto Basophil # : x  Auto Neutrophil % : x  Auto Lymphocyte % : x  Auto Monocyte % : x  Auto Eosinophil % : x  Auto Basophil % : x    05-27    146<H>  |  113<H>  |  39<H>  ----------------------------<  131<H>  3.7   |  22  |  2.58<H>    Ca    8.4      27 May 2022 17:55  Phos  3.8     05-27  Mg     2.5     05-27    TPro  6.4  /  Alb  3.3  /  TBili  0.8  /  DBili  x   /  AST  21  /  ALT  7<L>  /  AlkPhos  67  05-27    PT/INR - ( 27 May 2022 17:55 )   PT: 17.7 sec;   INR: 1.48          PTT - ( 27 May 2022 17:55 )  PTT:25.8 sec  The current medications were reviewed   MEDICATIONS  (STANDING):  aspirin  chewable 81 milliGRAM(s) Oral daily  chlorhexidine 2% Cloths 1 Application(s) Topical daily  dextrose 5%. 1000 milliLiter(s) (100 mL/Hr) IV Continuous <Continuous>  dextrose 5%. 1000 milliLiter(s) (50 mL/Hr) IV Continuous <Continuous>  dextrose 50% Injectable 25 Gram(s) IV Push once  dextrose 50% Injectable 25 Gram(s) IV Push once  dextrose 50% Injectable 12.5 Gram(s) IV Push once  esMOLOL  Infusion 50 MICROgram(s)/kG/Min (24.5 mL/Hr) IV Continuous <Continuous>  folic acid 1 milliGRAM(s) Oral daily  furosemide   Injectable 60 milliGRAM(s) IV Push daily  glucagon  Injectable 1 milliGRAM(s) IntraMuscular once  heparin   Injectable 5000 Unit(s) SubCutaneous every 8 hours  insulin lispro (ADMELOG) corrective regimen sliding scale   SubCutaneous Before meals and at bedtime  labetalol 300 milliGRAM(s) Oral every 6 hours  milrinone Infusion 0.125 MICROgram(s)/kG/Min (3.06 mL/Hr) IV Continuous <Continuous>  multivitamin 1 Tablet(s) Oral daily  niCARdipine Infusion 5 mG/Hr (25 mL/Hr) IV Continuous <Continuous>  pantoprazole    Tablet 40 milliGRAM(s) Oral before breakfast  sodium chloride 0.9% lock flush 3 milliLiter(s) IV Push every 8 hours  sodium chloride 0.9%. 1000 milliLiter(s) (10 mL/Hr) IV Continuous <Continuous>  thiamine 100 milliGRAM(s) Oral daily    MEDICATIONS  (PRN):  acetaminophen     Tablet .. 650 milliGRAM(s) Oral every 6 hours PRN Mild Pain (1 - 3)  dextrose Oral Gel 15 Gram(s) Oral once PRN Blood Glucose LESS THAN 70 milliGRAM(s)/deciliter  fentaNYL    Injectable 25 MICROGram(s) IV Push every 3 hours PRN Severe Pain (7 - 10)        61y old  Male admitted with aortic dissection.  S/P aortic root reconstruction.  S/P Replacement of AA and Hemiarch.   Hemodynamically stable.  Good oxygenation.  Fair urine out put.        My plan includes :  IV nicardipine.  Gentle diuresis.  Statin and Betablocker.  Close hemodynamic, ventilatory and drain monitoring and management  Monitor for arrhythmias and monitor parameters for organ perfusion  Monitor neurologic status  Monitor renal function.  Head of the bed should remain elevated to 45 deg .   Chest PT and IS will be encouraged  Monitor adequacy of oxygenation and ventilation and attempt to wean oxygen  Nutritional goals will be met using po eventually , ensure adequate caloric intake and monitor the same  Stress ulcer and VTE prophylaxis will be achieved    Glycemic control is satisfactory  Electrolytes have been repleted as necessary and wound care has been carried out. Pain control has been achieved.   Aggressive physical therapy and early mobility and ambulation goals will be met   The family was updated about the course and plan  CRITICAL CARE TIME SPENT in evaluation and management, reassessments, review and interpretation of labs and x-rays, ventilator and hemodynamic management, formulating a plan and coordinating care: ___90____ MIN.  Time does not include procedural time.  CTICU ATTENDING     					    Deshaun Muller MD

## 2022-05-27 NOTE — PROGRESS NOTE ADULT - SUBJECTIVE AND OBJECTIVE BOX
CRITICAL CARE ATTENDING - CTICU    MEDICATIONS  (STANDING):  aspirin  chewable 81 milliGRAM(s) Oral daily  chlorhexidine 2% Cloths 1 Application(s) Topical daily  dextrose 5%. 1000 milliLiter(s) (50 mL/Hr) IV Continuous <Continuous>  dextrose 5%. 1000 milliLiter(s) (100 mL/Hr) IV Continuous <Continuous>  dextrose 50% Injectable 25 Gram(s) IV Push once  dextrose 50% Injectable 12.5 Gram(s) IV Push once  dextrose 50% Injectable 25 Gram(s) IV Push once  esMOLOL  Infusion 50 MICROgram(s)/kG/Min (24.5 mL/Hr) IV Continuous <Continuous>  folic acid 1 milliGRAM(s) Oral daily  furosemide   Injectable 60 milliGRAM(s) IV Push daily  glucagon  Injectable 1 milliGRAM(s) IntraMuscular once  heparin   Injectable 5000 Unit(s) SubCutaneous every 8 hours  insulin lispro (ADMELOG) corrective regimen sliding scale   SubCutaneous Before meals and at bedtime  labetalol 300 milliGRAM(s) Oral every 6 hours  milrinone Infusion 0.125 MICROgram(s)/kG/Min (3.06 mL/Hr) IV Continuous <Continuous>  multivitamin 1 Tablet(s) Oral daily  niCARdipine Infusion 5 mG/Hr (25 mL/Hr) IV Continuous <Continuous>  pantoprazole    Tablet 40 milliGRAM(s) Oral before breakfast  sodium chloride 0.9% lock flush 3 milliLiter(s) IV Push every 8 hours  sodium chloride 0.9%. 1000 milliLiter(s) (10 mL/Hr) IV Continuous <Continuous>  thiamine 100 milliGRAM(s) Oral daily                                    8.4    11.76 )-----------( 192      ( 27 May 2022 02:50 )             24.8       05-27    148<H>  |  115<H>  |  43<H>  ----------------------------<  149<H>  4.0   |  22  |  2.77<H>    Ca    8.4      27 May 2022 02:50  Phos  3.4     05-27  Mg     2.6     05-27    TPro  6.4  /  Alb  3.3  /  TBili  0.9  /  DBili  x   /  AST  23  /  ALT  6<L>  /  AlkPhos  64  05-27      PT/INR - ( 27 May 2022 02:50 )   PT: 18.1 sec;   INR: 1.51          PTT - ( 27 May 2022 02:50 )  PTT:26.8 sec        Daily     Daily       05-26 @ 07:01 - 05-27 @ 07:00  --------------------------------------------------------  IN: 2286 mL / OUT: 2990 mL / NET: -704 mL    05-27 @ 07:01 - 05-27 @ 11:40  --------------------------------------------------------  IN: 328.2 mL / OUT: 665 mL / NET: -336.8 mL        Critically Ill patient  : [ ] preoperative ,   [ x] post operative    Requires :  [ x] Arterial Line   [ x] Central Line  [ ] PA catheter  [ ] IABP  [ ] ECMO  [ ] LVAD  [ ] Ventilator  [ ] pacemaker [ ] Impella.                      [ ] ABG's     [ ] Pulse Oxymetry Monitoring  Bedside evaluation , monitoring , treatment of hemodynamics , fluids , IVP/ IVCD meds.        Diagnosis:     Type A aortic Dissection - subacute    POD 3 - AVR / Ascendind - Leonid Arch replacement     Hypertension     Hemodynamic lability,  instability. Requires IVCD [ ] vasopressors [x ] inotropes  [ x] vasodilator  [ ]IVSS fluid  to maintain MAP, perfusion, C.I.     Conversion to P.O. Labetalol    Agitation last night     Hypernatremia     Renal Failure - Acute Kidney Injury     Smoker     Requires bedside physical therapy, mobilization and total alf care.     Hypoxia - HF O2                             Discussed with CT surgeon, Physician's Assistant - Nurse Practitioner- Critical care medicine team.   Discussed at  AM / PM rounds.   Chart, labs , films reviewed.    Cumulative Critical Care Time Given Today : 30 min

## 2022-05-28 LAB
ALBUMIN SERPL ELPH-MCNC: 3.3 G/DL — SIGNIFICANT CHANGE UP (ref 3.3–5)
ALP SERPL-CCNC: 79 U/L — SIGNIFICANT CHANGE UP (ref 40–120)
ALT FLD-CCNC: 12 U/L — SIGNIFICANT CHANGE UP (ref 10–45)
ANION GAP SERPL CALC-SCNC: 10 MMOL/L — SIGNIFICANT CHANGE UP (ref 5–17)
ANION GAP SERPL CALC-SCNC: 10 MMOL/L — SIGNIFICANT CHANGE UP (ref 5–17)
APTT BLD: 26 SEC — LOW (ref 27.5–35.5)
APTT BLD: 26.9 SEC — LOW (ref 27.5–35.5)
AST SERPL-CCNC: 19 U/L — SIGNIFICANT CHANGE UP (ref 10–40)
BILIRUB SERPL-MCNC: 0.7 MG/DL — SIGNIFICANT CHANGE UP (ref 0.2–1.2)
BLD GP AB SCN SERPL QL: NEGATIVE — SIGNIFICANT CHANGE UP
BUN SERPL-MCNC: 40 MG/DL — HIGH (ref 7–23)
BUN SERPL-MCNC: 40 MG/DL — HIGH (ref 7–23)
CALCIUM SERPL-MCNC: 8.4 MG/DL — SIGNIFICANT CHANGE UP (ref 8.4–10.5)
CALCIUM SERPL-MCNC: 8.8 MG/DL — SIGNIFICANT CHANGE UP (ref 8.4–10.5)
CHLORIDE SERPL-SCNC: 114 MMOL/L — HIGH (ref 96–108)
CHLORIDE SERPL-SCNC: 114 MMOL/L — HIGH (ref 96–108)
CO2 SERPL-SCNC: 23 MMOL/L — SIGNIFICANT CHANGE UP (ref 22–31)
CO2 SERPL-SCNC: 23 MMOL/L — SIGNIFICANT CHANGE UP (ref 22–31)
CREAT SERPL-MCNC: 2.21 MG/DL — HIGH (ref 0.5–1.3)
CREAT SERPL-MCNC: 2.22 MG/DL — HIGH (ref 0.5–1.3)
EGFR: 33 ML/MIN/1.73M2 — LOW
EGFR: 33 ML/MIN/1.73M2 — LOW
GAS PNL BLDA: SIGNIFICANT CHANGE UP
GAS PNL BLDA: SIGNIFICANT CHANGE UP
GLUCOSE BLDC GLUCOMTR-MCNC: 126 MG/DL — HIGH (ref 70–99)
GLUCOSE BLDC GLUCOMTR-MCNC: 126 MG/DL — HIGH (ref 70–99)
GLUCOSE BLDC GLUCOMTR-MCNC: 136 MG/DL — HIGH (ref 70–99)
GLUCOSE SERPL-MCNC: 121 MG/DL — HIGH (ref 70–99)
GLUCOSE SERPL-MCNC: 122 MG/DL — HIGH (ref 70–99)
HCT VFR BLD CALC: 25.5 % — LOW (ref 39–50)
HCT VFR BLD CALC: 26.7 % — LOW (ref 39–50)
HGB BLD-MCNC: 8.4 G/DL — LOW (ref 13–17)
HGB BLD-MCNC: 8.8 G/DL — LOW (ref 13–17)
INR BLD: 1.44 — HIGH (ref 0.88–1.16)
INR BLD: 1.48 — HIGH (ref 0.88–1.16)
MAGNESIUM SERPL-MCNC: 2.4 MG/DL — SIGNIFICANT CHANGE UP (ref 1.6–2.6)
MAGNESIUM SERPL-MCNC: 2.6 MG/DL — SIGNIFICANT CHANGE UP (ref 1.6–2.6)
MCHC RBC-ENTMCNC: 29.5 PG — SIGNIFICANT CHANGE UP (ref 27–34)
MCHC RBC-ENTMCNC: 29.6 PG — SIGNIFICANT CHANGE UP (ref 27–34)
MCHC RBC-ENTMCNC: 32.9 GM/DL — SIGNIFICANT CHANGE UP (ref 32–36)
MCHC RBC-ENTMCNC: 33 GM/DL — SIGNIFICANT CHANGE UP (ref 32–36)
MCV RBC AUTO: 89.5 FL — SIGNIFICANT CHANGE UP (ref 80–100)
MCV RBC AUTO: 89.9 FL — SIGNIFICANT CHANGE UP (ref 80–100)
NRBC # BLD: 0 /100 WBCS — SIGNIFICANT CHANGE UP (ref 0–0)
NRBC # BLD: 0 /100 WBCS — SIGNIFICANT CHANGE UP (ref 0–0)
PHOSPHATE SERPL-MCNC: 3.5 MG/DL — SIGNIFICANT CHANGE UP (ref 2.5–4.5)
PLATELET # BLD AUTO: 217 K/UL — SIGNIFICANT CHANGE UP (ref 150–400)
PLATELET # BLD AUTO: 243 K/UL — SIGNIFICANT CHANGE UP (ref 150–400)
POTASSIUM SERPL-MCNC: 4 MMOL/L — SIGNIFICANT CHANGE UP (ref 3.5–5.3)
POTASSIUM SERPL-MCNC: 4 MMOL/L — SIGNIFICANT CHANGE UP (ref 3.5–5.3)
POTASSIUM SERPL-SCNC: 4 MMOL/L — SIGNIFICANT CHANGE UP (ref 3.5–5.3)
POTASSIUM SERPL-SCNC: 4 MMOL/L — SIGNIFICANT CHANGE UP (ref 3.5–5.3)
PROT SERPL-MCNC: 6.6 G/DL — SIGNIFICANT CHANGE UP (ref 6–8.3)
PROTHROM AB SERPL-ACNC: 17.2 SEC — HIGH (ref 10.5–13.4)
PROTHROM AB SERPL-ACNC: 17.7 SEC — HIGH (ref 10.5–13.4)
RBC # BLD: 2.85 M/UL — LOW (ref 4.2–5.8)
RBC # BLD: 2.97 M/UL — LOW (ref 4.2–5.8)
RBC # FLD: 14.3 % — SIGNIFICANT CHANGE UP (ref 10.3–14.5)
RBC # FLD: 14.6 % — HIGH (ref 10.3–14.5)
RH IG SCN BLD-IMP: POSITIVE — SIGNIFICANT CHANGE UP
SODIUM SERPL-SCNC: 147 MMOL/L — HIGH (ref 135–145)
SODIUM SERPL-SCNC: 147 MMOL/L — HIGH (ref 135–145)
WBC # BLD: 8.96 K/UL — SIGNIFICANT CHANGE UP (ref 3.8–10.5)
WBC # BLD: 9.54 K/UL — SIGNIFICANT CHANGE UP (ref 3.8–10.5)
WBC # FLD AUTO: 8.96 K/UL — SIGNIFICANT CHANGE UP (ref 3.8–10.5)
WBC # FLD AUTO: 9.54 K/UL — SIGNIFICANT CHANGE UP (ref 3.8–10.5)

## 2022-05-28 PROCEDURE — 99291 CRITICAL CARE FIRST HOUR: CPT

## 2022-05-28 PROCEDURE — 71045 X-RAY EXAM CHEST 1 VIEW: CPT | Mod: 26

## 2022-05-28 PROCEDURE — 99292 CRITICAL CARE ADDL 30 MIN: CPT

## 2022-05-28 PROCEDURE — 99232 SBSQ HOSP IP/OBS MODERATE 35: CPT

## 2022-05-28 RX ORDER — LABETALOL HCL 100 MG
600 TABLET ORAL EVERY 6 HOURS
Refills: 0 | Status: DISCONTINUED | OUTPATIENT
Start: 2022-05-28 | End: 2022-05-30

## 2022-05-28 RX ORDER — MODAFINIL 200 MG/1
200 TABLET ORAL DAILY
Refills: 0 | Status: DISCONTINUED | OUTPATIENT
Start: 2022-05-28 | End: 2022-05-30

## 2022-05-28 RX ORDER — SERTRALINE 25 MG/1
25 TABLET, FILM COATED ORAL DAILY
Refills: 0 | Status: DISCONTINUED | OUTPATIENT
Start: 2022-05-28 | End: 2022-06-06

## 2022-05-28 RX ADMIN — Medication 1 PATCH: at 07:10

## 2022-05-28 RX ADMIN — SERTRALINE 25 MILLIGRAM(S): 25 TABLET, FILM COATED ORAL at 17:28

## 2022-05-28 RX ADMIN — Medication 300 MILLIGRAM(S): at 06:44

## 2022-05-28 RX ADMIN — Medication 650 MILLIGRAM(S): at 17:29

## 2022-05-28 RX ADMIN — HEPARIN SODIUM 5000 UNIT(S): 5000 INJECTION INTRAVENOUS; SUBCUTANEOUS at 14:21

## 2022-05-28 RX ADMIN — HEPARIN SODIUM 5000 UNIT(S): 5000 INJECTION INTRAVENOUS; SUBCUTANEOUS at 06:38

## 2022-05-28 RX ADMIN — Medication 81 MILLIGRAM(S): at 10:29

## 2022-05-28 RX ADMIN — SODIUM CHLORIDE 3 MILLILITER(S): 9 INJECTION INTRAMUSCULAR; INTRAVENOUS; SUBCUTANEOUS at 21:43

## 2022-05-28 RX ADMIN — Medication 300 MILLIGRAM(S): at 00:42

## 2022-05-28 RX ADMIN — NICARDIPINE HYDROCHLORIDE 25 MG/HR: 30 CAPSULE, EXTENDED RELEASE ORAL at 02:37

## 2022-05-28 RX ADMIN — Medication 600 MILLIGRAM(S): at 17:20

## 2022-05-28 RX ADMIN — PANTOPRAZOLE SODIUM 40 MILLIGRAM(S): 20 TABLET, DELAYED RELEASE ORAL at 10:30

## 2022-05-28 RX ADMIN — Medication 1 TABLET(S): at 10:29

## 2022-05-28 RX ADMIN — Medication 4 MILLIGRAM(S): at 13:19

## 2022-05-28 RX ADMIN — Medication 650 MILLIGRAM(S): at 10:32

## 2022-05-28 RX ADMIN — Medication 4 MILLIGRAM(S): at 17:29

## 2022-05-28 RX ADMIN — SODIUM CHLORIDE 3 MILLILITER(S): 9 INJECTION INTRAMUSCULAR; INTRAVENOUS; SUBCUTANEOUS at 06:30

## 2022-05-28 RX ADMIN — HEPARIN SODIUM 5000 UNIT(S): 5000 INJECTION INTRAVENOUS; SUBCUTANEOUS at 21:30

## 2022-05-28 RX ADMIN — MODAFINIL 200 MILLIGRAM(S): 200 TABLET ORAL at 17:28

## 2022-05-28 RX ADMIN — Medication 1 PATCH: at 17:29

## 2022-05-28 RX ADMIN — Medication 650 MILLIGRAM(S): at 17:20

## 2022-05-28 RX ADMIN — CHLORHEXIDINE GLUCONATE 1 APPLICATION(S): 213 SOLUTION TOPICAL at 15:38

## 2022-05-28 RX ADMIN — Medication 60 MILLIGRAM(S): at 06:39

## 2022-05-28 RX ADMIN — Medication 100 MILLIGRAM(S): at 10:30

## 2022-05-28 RX ADMIN — Medication 650 MILLIGRAM(S): at 10:31

## 2022-05-28 RX ADMIN — SODIUM CHLORIDE 3 MILLILITER(S): 9 INJECTION INTRAMUSCULAR; INTRAVENOUS; SUBCUTANEOUS at 17:20

## 2022-05-28 RX ADMIN — Medication 1 MILLIGRAM(S): at 10:30

## 2022-05-28 RX ADMIN — Medication 600 MILLIGRAM(S): at 13:07

## 2022-05-28 NOTE — PROGRESS NOTE ADULT - SUBJECTIVE AND OBJECTIVE BOX
CTICU  CRITICAL  CARE  attending     Hand off received 					   Pertinent clinical, laboratory, radiographic, hemodynamic, echocardiographic, respiratory data, microbiologic data and chart were reviewed and analyzed frequently throughout the course of the day and night        61 year old male with HTN, HLD, Current smoker (30 pack year history), admitted to outside hospital with CP on 5/16/2022 ,   Cardiac cath showed non-obstructive CAD, EF without valvulopathy and normal EF.  On the day of discharge (5/21/2022) patient found to be febrile and underwent infectious work-up.   CT chest shows thoracic aortic changes with follow up CTA chest showing chronic thrombosed dissection versus intramural hematoma along the thoracic aorta.   There is small pericardial effusion and left pleural effusion vs hemothorax.  The patient was transferred to [facility] under the care of Dr. Ford for surgical evaluation.    S/P Aortic Root Reconstruction.  S/P Replacement of AA and Hemiarch.         FAMILY HISTORY:  No pertinent family history in first degree relatives    PAST MEDICAL & SURGICAL HISTORY:  HTN (hypertension)  HLD (hyperlipidemia)  Smoker  No significant past surgical history          14 system review was unremarkable    Vital signs, hemodynamic and respiratory parameters were reviewed from the bedside nursing flow sheet.  ICU Vital Signs Last 24 Hrs  T(C): 36.2 (28 May 2022 18:31), Max: 36.9 (28 May 2022 05:01)  T(F): 97.1 (28 May 2022 18:31), Max: 98.5 (28 May 2022 05:01)  HR: 58 (28 May 2022 18:00) (58 - 69)  BP: 111/68 (28 May 2022 18:00) (111/68 - 152/90)  BP(mean): 84 (28 May 2022 18:00) (84 - 115)  ABP: 121/63 (28 May 2022 18:00) (121/63 - 178/78)  ABP(mean): 81 (28 May 2022 18:00) (81 - 107)  RR: 18 (28 May 2022 18:00) (14 - 20)  SpO2: 99% (28 May 2022 18:00) (94% - 100%)    Adult Advanced Hemodynamics Last 24 Hrs  CVP(mm Hg): 5 (28 May 2022 14:00) (5 - 22)  CVP(cm H2O): --  CO: --  CI: --  PA: --  PA(mean): --  PCWP: --  SVR: --  SVRI: --  PVR: --  PVRI: --, ABG - ( 28 May 2022 11:59 )  pH, Arterial: 7.44  pH, Blood: x     /  pCO2: 35    /  pO2: 171   / HCO3: 24    / Base Excess: 0.1   /  SaO2: 100.0               Intake and output was reviewed and the fluid balance was calculated  Daily     Daily   I&O's Summary    27 May 2022 07:01  -  28 May 2022 07:00  --------------------------------------------------------  IN: 982 mL / OUT: 3795 mL / NET: -2813 mL    28 May 2022 07:01  -  28 May 2022 20:04  --------------------------------------------------------  IN: 505 mL / OUT: 2410 mL / NET: -1905 mL        All lines and drain sites were assessed    Neuro: No focal motor deficit seen post op.  Neck: No JVD.  CVS: S1, S2, No S3.  Lungs: Good air entry bilaterally.  Abd: Soft. No tenderness. + Bowel sounds.  Vascular: + DP/PT.  Extremities: No edema.  Lymphatic: Normal.  Skin: No abnormalities.      labs  CBC Full  -  ( 28 May 2022 12:00 )  WBC Count : 8.96 K/uL  RBC Count : 2.97 M/uL  Hemoglobin : 8.8 g/dL  Hematocrit : 26.7 %  Platelet Count - Automated : 243 K/uL  Mean Cell Volume : 89.9 fl  Mean Cell Hemoglobin : 29.6 pg  Mean Cell Hemoglobin Concentration : 33.0 gm/dL  Auto Neutrophil # : x  Auto Lymphocyte # : x  Auto Monocyte # : x  Auto Eosinophil # : x  Auto Basophil # : x  Auto Neutrophil % : x  Auto Lymphocyte % : x  Auto Monocyte % : x  Auto Eosinophil % : x  Auto Basophil % : x    05-28    147<H>  |  114<H>  |  40<H>  ----------------------------<  122<H>  4.0   |  23  |  2.22<H>    Ca    8.8      28 May 2022 12:00  Phos  3.5     05-28  Mg     2.6     05-28    TPro  6.6  /  Alb  3.3  /  TBili  0.7  /  DBili  x   /  AST  19  /  ALT  12  /  AlkPhos  79  05-28    PT/INR - ( 28 May 2022 12:00 )   PT: 17.2 sec;   INR: 1.44          PTT - ( 28 May 2022 12:00 )  PTT:26.9 sec  The current medications were reviewed   MEDICATIONS  (STANDING):  aspirin  chewable 81 milliGRAM(s) Oral daily  chlorhexidine 2% Cloths 1 Application(s) Topical daily  dextrose 5%. 1000 milliLiter(s) (50 mL/Hr) IV Continuous <Continuous>  dextrose 5%. 1000 milliLiter(s) (100 mL/Hr) IV Continuous <Continuous>  dextrose 50% Injectable 25 Gram(s) IV Push once  dextrose 50% Injectable 12.5 Gram(s) IV Push once  dextrose 50% Injectable 25 Gram(s) IV Push once  folic acid 1 milliGRAM(s) Oral daily  furosemide   Injectable 60 milliGRAM(s) IV Push daily  glucagon  Injectable 1 milliGRAM(s) IntraMuscular once  heparin   Injectable 5000 Unit(s) SubCutaneous every 8 hours  insulin lispro (ADMELOG) corrective regimen sliding scale   SubCutaneous Before meals and at bedtime  labetalol 600 milliGRAM(s) Oral every 6 hours  modafinil 200 milliGRAM(s) Oral daily  multivitamin 1 Tablet(s) Oral daily  niCARdipine Infusion 5 mG/Hr (25 mL/Hr) IV Continuous <Continuous>  pantoprazole  Injectable 40 milliGRAM(s) IV Push daily  sertraline 25 milliGRAM(s) Oral daily  sodium chloride 0.9% lock flush 3 milliLiter(s) IV Push every 8 hours  sodium chloride 0.9%. 1000 milliLiter(s) (10 mL/Hr) IV Continuous <Continuous>  testosterone patch 4 mG/24 Hr(s) 4 milliGRAM(s) Transdermal daily  thiamine 100 milliGRAM(s) Oral daily    MEDICATIONS  (PRN):  acetaminophen     Tablet .. 650 milliGRAM(s) Oral every 6 hours PRN Mild Pain (1 - 3)  dextrose Oral Gel 15 Gram(s) Oral once PRN Blood Glucose LESS THAN 70 milliGRAM(s)/deciliter  fentaNYL    Injectable 25 MICROGram(s) IV Push every 3 hours PRN Severe Pain (7 - 10)            61y old  Male admitted with aortic dissection.  S/P aortic root reconstruction.  S/P Replacement of AA and Hemiarch.   Renal failure  Hypernatremia   Metabolic acidosis.  Hemodynamically stable.  Good oxygenation.  Fair urine out put.        My plan includes :  Statin and Betablocker.  Close hemodynamic, ventilatory and drain monitoring and management  Monitor for arrhythmias and monitor parameters for organ perfusion  Monitor neurologic status  Monitor renal function.  Head of the bed should remain elevated to 45 deg .   Chest PT and IS will be encouraged  Monitor adequacy of oxygenation and ventilation and attempt to wean oxygen  Nutritional goals will be met using po eventually , ensure adequate caloric intake and monitor the same  Stress ulcer and VTE prophylaxis will be achieved    Glycemic control is satisfactory  Electrolytes have been repleted as necessary and wound care has been carried out. Pain control has been achieved.   Aggressive physical therapy and early mobility and ambulation goals will be met   The family was updated about the course and plan  CRITICAL CARE TIME SPENT in evaluation and management, reassessments, review and interpretation of labs and x-rays, ventilator and hemodynamic management, formulating a plan and coordinating care: ___30____ MIN.  Time does not include procedural time.  CTICU ATTENDING     					    Deshaun Muller MD

## 2022-05-28 NOTE — PROGRESS NOTE ADULT - SUBJECTIVE AND OBJECTIVE BOX
CTICU  CRITICAL  CARE  attending     Hand off received 					   Pertinent clinical, laboratory, radiographic, hemodynamic, echocardiographic, respiratory data, microbiologic data and chart were reviewed and analyzed frequently throughout the course of the day and night  Patient seen and examined with CTS/ SH attending at bedside  Pt is a 61y , Male, HEALTH ISSUES - PROBLEM Dx:      , FAMILY HISTORY:  No pertinent family history in first degree relatives    PAST MEDICAL & SURGICAL HISTORY:  HTN (hypertension)      HLD (hyperlipidemia)      Smoker      No significant past surgical history        Patient is a 61y old  Male who presents with a chief complaint of Intramural hematoma (28 May 2022 20:04)      14 system review was unremarkable    Vital signs, hemodynamic and respiratory parameters were reviewed from the bedside nursing flowsheet.  ICU Vital Signs Last 24 Hrs  T(C): 36.2 (28 May 2022 18:31), Max: 36.9 (28 May 2022 05:01)  T(F): 97.1 (28 May 2022 18:31), Max: 98.5 (28 May 2022 05:01)  HR: 61 (28 May 2022 20:00) (58 - 69)  BP: 130/74 (28 May 2022 20:00) (111/68 - 152/90)  BP(mean): 97 (28 May 2022 20:00) (84 - 115)  ABP: 147/76 (28 May 2022 20:00) (121/63 - 178/78)  ABP(mean): 100 (28 May 2022 20:00) (81 - 107)  RR: 18 (28 May 2022 18:00) (14 - 18)  SpO2: 99% (28 May 2022 20:00) (96% - 100%)    Adult Advanced Hemodynamics Last 24 Hrs  CVP(mm Hg): 5 (28 May 2022 14:00) (5 - 22)  CVP(cm H2O): --  CO: --  CI: --  PA: --  PA(mean): --  PCWP: --  SVR: --  SVRI: --  PVR: --  PVRI: --, ABG - ( 28 May 2022 11:59 )  pH, Arterial: 7.44  pH, Blood: x     /  pCO2: 35    /  pO2: 171   / HCO3: 24    / Base Excess: 0.1   /  SaO2: 100.0               Intake and output was reviewed and the fluid balance was calculated  Daily     Daily   I&O's Summary    27 May 2022 07:01  -  28 May 2022 07:00  --------------------------------------------------------  IN: 982 mL / OUT: 3795 mL / NET: -2813 mL    28 May 2022 07:01  -  28 May 2022 20:18  --------------------------------------------------------  IN: 505 mL / OUT: 2410 mL / NET: -1905 mL        All lines and drain sites were assessed  [facility]      POCT Blood Glucose.: 126 mg/dL (28 May 2022 11:22)    No acute change in mental status  Auscultation of the chest reveals equal bs  Abdomen is soft  Extremities are warm and well perfused  Wounds appear clean and unremarkable  Antibiotics are periop    labs  CBC Full  -  ( 28 May 2022 12:00 )  WBC Count : 8.96 K/uL  RBC Count : 2.97 M/uL  Hemoglobin : 8.8 g/dL  Hematocrit : 26.7 %  Platelet Count - Automated : 243 K/uL  Mean Cell Volume : 89.9 fl  Mean Cell Hemoglobin : 29.6 pg  Mean Cell Hemoglobin Concentration : 33.0 gm/dL  Auto Neutrophil # : x  Auto Lymphocyte # : x  Auto Monocyte # : x  Auto Eosinophil # : x  Auto Basophil # : x  Auto Neutrophil % : x  Auto Lymphocyte % : x  Auto Monocyte % : x  Auto Eosinophil % : x  Auto Basophil % : x    05-28    147<H>  |  114<H>  |  40<H>  ----------------------------<  122<H>  4.0   |  23  |  2.22<H>    Ca    8.8      28 May 2022 12:00  Phos  3.5     05-28  Mg     2.6     05-28    TPro  6.6  /  Alb  3.3  /  TBili  0.7  /  DBili  x   /  AST  19  /  ALT  12  /  AlkPhos  79  05-28    PT/INR - ( 28 May 2022 12:00 )   PT: 17.2 sec;   INR: 1.44          PTT - ( 28 May 2022 12:00 )  PTT:26.9 sec  The current medications were reviewed   MEDICATIONS  (STANDING):  aspirin  chewable 81 milliGRAM(s) Oral daily  chlorhexidine 2% Cloths 1 Application(s) Topical daily  dextrose 5%. 1000 milliLiter(s) (50 mL/Hr) IV Continuous <Continuous>  dextrose 5%. 1000 milliLiter(s) (100 mL/Hr) IV Continuous <Continuous>  dextrose 50% Injectable 25 Gram(s) IV Push once  dextrose 50% Injectable 12.5 Gram(s) IV Push once  dextrose 50% Injectable 25 Gram(s) IV Push once  folic acid 1 milliGRAM(s) Oral daily  furosemide   Injectable 60 milliGRAM(s) IV Push daily  glucagon  Injectable 1 milliGRAM(s) IntraMuscular once  heparin   Injectable 5000 Unit(s) SubCutaneous every 8 hours  insulin lispro (ADMELOG) corrective regimen sliding scale   SubCutaneous Before meals and at bedtime  labetalol 600 milliGRAM(s) Oral every 6 hours  modafinil 200 milliGRAM(s) Oral daily  multivitamin 1 Tablet(s) Oral daily  niCARdipine Infusion 5 mG/Hr (25 mL/Hr) IV Continuous <Continuous>  pantoprazole  Injectable 40 milliGRAM(s) IV Push daily  sertraline 25 milliGRAM(s) Oral daily  sodium chloride 0.9% lock flush 3 milliLiter(s) IV Push every 8 hours  sodium chloride 0.9%. 1000 milliLiter(s) (10 mL/Hr) IV Continuous <Continuous>  testosterone patch 4 mG/24 Hr(s) 4 milliGRAM(s) Transdermal daily  thiamine 100 milliGRAM(s) Oral daily    MEDICATIONS  (PRN):  acetaminophen     Tablet .. 650 milliGRAM(s) Oral every 6 hours PRN Mild Pain (1 - 3)  dextrose Oral Gel 15 Gram(s) Oral once PRN Blood Glucose LESS THAN 70 milliGRAM(s)/deciliter  fentaNYL    Injectable 25 MICROGram(s) IV Push every 3 hours PRN Severe Pain (7 - 10)       PROBLEM LIST/ ASSESSMENT:  HEALTH ISSUES - PROBLEM Dx:      ,   Patient is a 61y old  Male who presents with a chief complaint of Intramural hematoma (28 May 2022 20:04)     s/p cardiac surgery      Acute respiratory failure ruled in due to hypoxemia, O2 sats < 91% on RA treated with HFNC    Acidosis evidenced by anion gap and negative base excess    Acute kidney injury - creatinine > 0.3 due to combined prerenal and intrarenal factors can presume ATN          My plan includes :    increase labetalol   close hemodynamic, ventilatory and drain monitoring and management per post op routine    Monitor for arrhythmias and monitor parameters for organ perfusion  beta blockade not administered due to hemodynamic instability and bradycardia  monitor neurologic status  Head of the bed should remain elevated to 45 deg .   chest PT and IS will be encouraged  monitor adequacy of oxygenation and ventilation and attempt to wean oxygen  antibiotic regimen will be tailored to the clinical, laboratory and microbiologic data  Nutritional goals will be met using po eventually , ensure adequate caloric intake and montior the same  Stress ulcer and VTE prophylaxis will be achieved    Glycemic control is satisfactory  Electrolytes have been repleted as necessary and wound care has been carried out. Pain control has been achieved.   agressive physical therapy and early mobility and ambulation goals will be met   The family was updated about the course and plan  CRITICAL CARE TIME personally provided by me  in evaluation and management, reassessments, review and interpretation of labs and x-rays, ventilator and hemodynamic management, formulating a plan and coordinating care: ___90____ MIN.  Time does not include procedural time.  CTICU ATTENDING     					    Rj Prado MD

## 2022-05-29 LAB
ALBUMIN SERPL ELPH-MCNC: 3.2 G/DL — LOW (ref 3.3–5)
ALBUMIN SERPL ELPH-MCNC: 3.4 G/DL — SIGNIFICANT CHANGE UP (ref 3.3–5)
ALP SERPL-CCNC: 104 U/L — SIGNIFICANT CHANGE UP (ref 40–120)
ALP SERPL-CCNC: 110 U/L — SIGNIFICANT CHANGE UP (ref 40–120)
ALT FLD-CCNC: 28 U/L — SIGNIFICANT CHANGE UP (ref 10–45)
ALT FLD-CCNC: 28 U/L — SIGNIFICANT CHANGE UP (ref 10–45)
ANION GAP SERPL CALC-SCNC: 11 MMOL/L — SIGNIFICANT CHANGE UP (ref 5–17)
ANION GAP SERPL CALC-SCNC: 9 MMOL/L — SIGNIFICANT CHANGE UP (ref 5–17)
APTT BLD: 27.7 SEC — SIGNIFICANT CHANGE UP (ref 27.5–35.5)
APTT BLD: 28 SEC — SIGNIFICANT CHANGE UP (ref 27.5–35.5)
AST SERPL-CCNC: 36 U/L — SIGNIFICANT CHANGE UP (ref 10–40)
AST SERPL-CCNC: 54 U/L — HIGH (ref 10–40)
BILIRUB SERPL-MCNC: 0.6 MG/DL — SIGNIFICANT CHANGE UP (ref 0.2–1.2)
BILIRUB SERPL-MCNC: 0.6 MG/DL — SIGNIFICANT CHANGE UP (ref 0.2–1.2)
BUN SERPL-MCNC: 37 MG/DL — HIGH (ref 7–23)
BUN SERPL-MCNC: 39 MG/DL — HIGH (ref 7–23)
CALCIUM SERPL-MCNC: 8.7 MG/DL — SIGNIFICANT CHANGE UP (ref 8.4–10.5)
CALCIUM SERPL-MCNC: 8.8 MG/DL — SIGNIFICANT CHANGE UP (ref 8.4–10.5)
CHLORIDE SERPL-SCNC: 112 MMOL/L — HIGH (ref 96–108)
CHLORIDE SERPL-SCNC: 114 MMOL/L — HIGH (ref 96–108)
CO2 SERPL-SCNC: 23 MMOL/L — SIGNIFICANT CHANGE UP (ref 22–31)
CO2 SERPL-SCNC: 23 MMOL/L — SIGNIFICANT CHANGE UP (ref 22–31)
CREAT SERPL-MCNC: 2.16 MG/DL — HIGH (ref 0.5–1.3)
CREAT SERPL-MCNC: 2.21 MG/DL — HIGH (ref 0.5–1.3)
EGFR: 33 ML/MIN/1.73M2 — LOW
EGFR: 34 ML/MIN/1.73M2 — LOW
GAS PNL BLDA: SIGNIFICANT CHANGE UP
GAS PNL BLDA: SIGNIFICANT CHANGE UP
GLUCOSE BLDC GLUCOMTR-MCNC: 128 MG/DL — HIGH (ref 70–99)
GLUCOSE BLDC GLUCOMTR-MCNC: 144 MG/DL — HIGH (ref 70–99)
GLUCOSE BLDC GLUCOMTR-MCNC: 146 MG/DL — HIGH (ref 70–99)
GLUCOSE SERPL-MCNC: 150 MG/DL — HIGH (ref 70–99)
GLUCOSE SERPL-MCNC: 161 MG/DL — HIGH (ref 70–99)
HCT VFR BLD CALC: 27.2 % — LOW (ref 39–50)
HCT VFR BLD CALC: 28.4 % — LOW (ref 39–50)
HGB BLD-MCNC: 8.8 G/DL — LOW (ref 13–17)
HGB BLD-MCNC: 9.2 G/DL — LOW (ref 13–17)
INR BLD: 1.42 — HIGH (ref 0.88–1.16)
INR BLD: 1.44 — HIGH (ref 0.88–1.16)
MAGNESIUM SERPL-MCNC: 2.4 MG/DL — SIGNIFICANT CHANGE UP (ref 1.6–2.6)
MAGNESIUM SERPL-MCNC: 2.4 MG/DL — SIGNIFICANT CHANGE UP (ref 1.6–2.6)
MCHC RBC-ENTMCNC: 29.3 PG — SIGNIFICANT CHANGE UP (ref 27–34)
MCHC RBC-ENTMCNC: 29.5 PG — SIGNIFICANT CHANGE UP (ref 27–34)
MCHC RBC-ENTMCNC: 32.4 GM/DL — SIGNIFICANT CHANGE UP (ref 32–36)
MCHC RBC-ENTMCNC: 32.4 GM/DL — SIGNIFICANT CHANGE UP (ref 32–36)
MCV RBC AUTO: 90.7 FL — SIGNIFICANT CHANGE UP (ref 80–100)
MCV RBC AUTO: 91 FL — SIGNIFICANT CHANGE UP (ref 80–100)
NRBC # BLD: 0 /100 WBCS — SIGNIFICANT CHANGE UP (ref 0–0)
NRBC # BLD: 0 /100 WBCS — SIGNIFICANT CHANGE UP (ref 0–0)
PHOSPHATE SERPL-MCNC: 3.5 MG/DL — SIGNIFICANT CHANGE UP (ref 2.5–4.5)
PLATELET # BLD AUTO: 244 K/UL — SIGNIFICANT CHANGE UP (ref 150–400)
PLATELET # BLD AUTO: 270 K/UL — SIGNIFICANT CHANGE UP (ref 150–400)
POTASSIUM SERPL-MCNC: 3.8 MMOL/L — SIGNIFICANT CHANGE UP (ref 3.5–5.3)
POTASSIUM SERPL-MCNC: 3.9 MMOL/L — SIGNIFICANT CHANGE UP (ref 3.5–5.3)
POTASSIUM SERPL-SCNC: 3.8 MMOL/L — SIGNIFICANT CHANGE UP (ref 3.5–5.3)
POTASSIUM SERPL-SCNC: 3.9 MMOL/L — SIGNIFICANT CHANGE UP (ref 3.5–5.3)
PROT SERPL-MCNC: 6.3 G/DL — SIGNIFICANT CHANGE UP (ref 6–8.3)
PROT SERPL-MCNC: 6.9 G/DL — SIGNIFICANT CHANGE UP (ref 6–8.3)
PROTHROM AB SERPL-ACNC: 17 SEC — HIGH (ref 10.5–13.4)
PROTHROM AB SERPL-ACNC: 17.2 SEC — HIGH (ref 10.5–13.4)
RBC # BLD: 3 M/UL — LOW (ref 4.2–5.8)
RBC # BLD: 3.12 M/UL — LOW (ref 4.2–5.8)
RBC # FLD: 14.7 % — HIGH (ref 10.3–14.5)
RBC # FLD: 14.7 % — HIGH (ref 10.3–14.5)
SARS-COV-2 RNA SPEC QL NAA+PROBE: SIGNIFICANT CHANGE UP
SODIUM SERPL-SCNC: 146 MMOL/L — HIGH (ref 135–145)
SODIUM SERPL-SCNC: 146 MMOL/L — HIGH (ref 135–145)
WBC # BLD: 7.93 K/UL — SIGNIFICANT CHANGE UP (ref 3.8–10.5)
WBC # BLD: 8.31 K/UL — SIGNIFICANT CHANGE UP (ref 3.8–10.5)
WBC # FLD AUTO: 7.93 K/UL — SIGNIFICANT CHANGE UP (ref 3.8–10.5)
WBC # FLD AUTO: 8.31 K/UL — SIGNIFICANT CHANGE UP (ref 3.8–10.5)

## 2022-05-29 PROCEDURE — 99292 CRITICAL CARE ADDL 30 MIN: CPT

## 2022-05-29 PROCEDURE — 71045 X-RAY EXAM CHEST 1 VIEW: CPT | Mod: 26

## 2022-05-29 PROCEDURE — 99291 CRITICAL CARE FIRST HOUR: CPT

## 2022-05-29 RX ORDER — HALOPERIDOL DECANOATE 100 MG/ML
5 INJECTION INTRAMUSCULAR ONCE
Refills: 0 | Status: COMPLETED | OUTPATIENT
Start: 2022-05-29 | End: 2022-05-29

## 2022-05-29 RX ORDER — DEXMEDETOMIDINE HYDROCHLORIDE IN 0.9% SODIUM CHLORIDE 4 UG/ML
0.1 INJECTION INTRAVENOUS
Qty: 400 | Refills: 0 | Status: DISCONTINUED | OUTPATIENT
Start: 2022-05-29 | End: 2022-05-30

## 2022-05-29 RX ORDER — AMLODIPINE BESYLATE 2.5 MG/1
2.5 TABLET ORAL DAILY
Refills: 0 | Status: DISCONTINUED | OUTPATIENT
Start: 2022-05-29 | End: 2022-05-30

## 2022-05-29 RX ADMIN — Medication 60 MILLIGRAM(S): at 06:07

## 2022-05-29 RX ADMIN — Medication 1 PATCH: at 07:06

## 2022-05-29 RX ADMIN — HALOPERIDOL DECANOATE 5 MILLIGRAM(S): 100 INJECTION INTRAMUSCULAR at 22:30

## 2022-05-29 RX ADMIN — Medication 600 MILLIGRAM(S): at 18:49

## 2022-05-29 RX ADMIN — Medication 1 TABLET(S): at 10:35

## 2022-05-29 RX ADMIN — Medication 100 MILLIGRAM(S): at 10:47

## 2022-05-29 RX ADMIN — CHLORHEXIDINE GLUCONATE 1 APPLICATION(S): 213 SOLUTION TOPICAL at 15:58

## 2022-05-29 RX ADMIN — SODIUM CHLORIDE 3 MILLILITER(S): 9 INJECTION INTRAMUSCULAR; INTRAVENOUS; SUBCUTANEOUS at 06:28

## 2022-05-29 RX ADMIN — HEPARIN SODIUM 5000 UNIT(S): 5000 INJECTION INTRAVENOUS; SUBCUTANEOUS at 14:29

## 2022-05-29 RX ADMIN — SODIUM CHLORIDE 3 MILLILITER(S): 9 INJECTION INTRAMUSCULAR; INTRAVENOUS; SUBCUTANEOUS at 15:58

## 2022-05-29 RX ADMIN — Medication 81 MILLIGRAM(S): at 10:34

## 2022-05-29 RX ADMIN — SODIUM CHLORIDE 3 MILLILITER(S): 9 INJECTION INTRAMUSCULAR; INTRAVENOUS; SUBCUTANEOUS at 22:00

## 2022-05-29 RX ADMIN — Medication 4 MILLIGRAM(S): at 19:00

## 2022-05-29 RX ADMIN — Medication 600 MILLIGRAM(S): at 11:44

## 2022-05-29 RX ADMIN — Medication 4 MILLIGRAM(S): at 11:39

## 2022-05-29 RX ADMIN — HEPARIN SODIUM 5000 UNIT(S): 5000 INJECTION INTRAVENOUS; SUBCUTANEOUS at 06:07

## 2022-05-29 RX ADMIN — NICARDIPINE HYDROCHLORIDE 25 MG/HR: 30 CAPSULE, EXTENDED RELEASE ORAL at 05:06

## 2022-05-29 RX ADMIN — HEPARIN SODIUM 5000 UNIT(S): 5000 INJECTION INTRAVENOUS; SUBCUTANEOUS at 21:28

## 2022-05-29 RX ADMIN — Medication 1 MILLIGRAM(S): at 10:35

## 2022-05-29 RX ADMIN — Medication 600 MILLIGRAM(S): at 06:07

## 2022-05-29 RX ADMIN — Medication 4 MILLIGRAM(S): at 11:00

## 2022-05-29 RX ADMIN — DEXMEDETOMIDINE HYDROCHLORIDE IN 0.9% SODIUM CHLORIDE 2.04 MICROGRAM(S)/KG/HR: 4 INJECTION INTRAVENOUS at 23:25

## 2022-05-29 RX ADMIN — SERTRALINE 25 MILLIGRAM(S): 25 TABLET, FILM COATED ORAL at 10:47

## 2022-05-29 RX ADMIN — Medication 4 MILLIGRAM(S): at 07:06

## 2022-05-29 RX ADMIN — MODAFINIL 200 MILLIGRAM(S): 200 TABLET ORAL at 11:38

## 2022-05-29 RX ADMIN — PANTOPRAZOLE SODIUM 40 MILLIGRAM(S): 20 TABLET, DELAYED RELEASE ORAL at 10:53

## 2022-05-29 NOTE — PROGRESS NOTE ADULT - SUBJECTIVE AND OBJECTIVE BOX
CTICU  CRITICAL  CARE  attending     Hand off received 					   Pertinent clinical, laboratory, radiographic, hemodynamic, echocardiographic, respiratory data, microbiologic data and chart were reviewed and analyzed frequently throughout the course of the day and night      61 year old male with HTN, HLD, Current smoker (30 pack year history), admitted to outside hospital with CP on 5/16/2022 ,   Cardiac cath showed non-obstructive CAD, EF without valvulopathy and normal EF.  On the day of discharge (5/21/2022) patient found to be febrile and underwent infectious work-up.   CT chest shows thoracic aortic changes with follow up CTA chest showing chronic thrombosed dissection versus intramural hematoma along the thoracic aorta.   There is small pericardial effusion and left pleural effusion vs hemothorax.  The patient was transferred [facility] under the care of Dr. Ford for surgical evaluation.    S/P Aortic Root Reconstruction.  S/P Replacement of AA and Hemiarch.      FAMILY HISTORY:  No pertinent family history in first degree relatives    PAST MEDICAL & SURGICAL HISTORY:  HTN (hypertension)  HLD (hyperlipidemia)  Smoker  No significant past surgical history            14 system review was unremarkable    Vital signs, hemodynamic and respiratory parameters were reviewed from the bedside nursing flow sheet.  ICU Vital Signs Last 24 Hrs  T(C): 36.4 (29 May 2022 17:25), Max: 36.6 (29 May 2022 01:01)  T(F): 97.6 (29 May 2022 17:25), Max: 97.9 (29 May 2022 01:01)  HR: 59 (29 May 2022 13:00) (58 - 61)  BP: 146/84 (29 May 2022 13:00) (116/64 - 150/76)  BP(mean): 109 (29 May 2022 13:00) (85 - 109)  ABP: 145/64 (29 May 2022 13:00) (139/62 - 166/79)  ABP(mean): 91 (29 May 2022 13:00) (87 - 107)  RR: 16 (29 May 2022 13:00) (14 - 20)  SpO2: 97% (29 May 2022 13:00) (94% - 100%)    Adult Advanced Hemodynamics Last 24 Hrs  CVP(mm Hg): --  CVP(cm H2O): --  CO: --  CI: --  PA: --  PA(mean): --  PCWP: --  SVR: --  SVRI: --  PVR: --  PVRI: --, ABG - ( 29 May 2022 12:24 )  pH, Arterial: 7.46  pH, Blood: x     /  pCO2: 34    /  pO2: 80    / HCO3: 24    / Base Excess: 0.8   /  SaO2: 99.0                Intake and output was reviewed and the fluid balance was calculated  Daily     Daily   I&O's Summary    28 May 2022 07:01  -  29 May 2022 07:00  --------------------------------------------------------  IN: 1325 mL / OUT: 3880 mL / NET: -2555 mL    29 May 2022 07:01  -  29 May 2022 18:19  --------------------------------------------------------  IN: 275 mL / OUT: 1010 mL / NET: -735 mL        All lines and drain sites were assessed    Neuro: Follows commands. Moves all 4 extremities.  Neck: No JVD.  CVS: S1, S2, No S3.  Lungs: Good air entry bilaterally.  Abd: Soft. No tenderness. + Bowel sounds.  Vascular: + DP/PT.  Extremities: No edema.  Lymphatic: Normal.  Skin: No abnormalities.      labs  CBC Full  -  ( 29 May 2022 12:26 )  WBC Count : 8.31 K/uL  RBC Count : 3.12 M/uL  Hemoglobin : 9.2 g/dL  Hematocrit : 28.4 %  Platelet Count - Automated : 270 K/uL  Mean Cell Volume : 91.0 fl  Mean Cell Hemoglobin : 29.5 pg  Mean Cell Hemoglobin Concentration : 32.4 gm/dL  Auto Neutrophil # : x  Auto Lymphocyte # : x  Auto Monocyte # : x  Auto Eosinophil # : x  Auto Basophil # : x  Auto Neutrophil % : x  Auto Lymphocyte % : x  Auto Monocyte % : x  Auto Eosinophil % : x  Auto Basophil % : x    05-29    146<H>  |  112<H>  |  37<H>  ----------------------------<  161<H>  3.8   |  23  |  2.16<H>    Ca    8.7      29 May 2022 12:26  Phos  3.5     05-29  Mg     2.4     05-29    TPro  6.9  /  Alb  3.4  /  TBili  0.6  /  DBili  x   /  AST  36  /  ALT  28  /  AlkPhos  110  05-29    PT/INR - ( 29 May 2022 12:26 )   PT: 17.2 sec;   INR: 1.44          PTT - ( 29 May 2022 12:26 )  PTT:27.7 sec  The current medications were reviewed   MEDICATIONS  (STANDING):  aspirin  chewable 81 milliGRAM(s) Oral daily  chlorhexidine 2% Cloths 1 Application(s) Topical daily  dextrose 5%. 1000 milliLiter(s) (50 mL/Hr) IV Continuous <Continuous>  dextrose 5%. 1000 milliLiter(s) (100 mL/Hr) IV Continuous <Continuous>  dextrose 50% Injectable 25 Gram(s) IV Push once  dextrose 50% Injectable 12.5 Gram(s) IV Push once  dextrose 50% Injectable 25 Gram(s) IV Push once  folic acid 1 milliGRAM(s) Oral daily  furosemide   Injectable 60 milliGRAM(s) IV Push daily  glucagon  Injectable 1 milliGRAM(s) IntraMuscular once  heparin   Injectable 5000 Unit(s) SubCutaneous every 8 hours  insulin lispro (ADMELOG) corrective regimen sliding scale   SubCutaneous Before meals and at bedtime  labetalol 600 milliGRAM(s) Oral every 6 hours  modafinil 200 milliGRAM(s) Oral daily  multivitamin 1 Tablet(s) Oral daily  niCARdipine Infusion 5 mG/Hr (25 mL/Hr) IV Continuous <Continuous>  pantoprazole  Injectable 40 milliGRAM(s) IV Push daily  sertraline 25 milliGRAM(s) Oral daily  sodium chloride 0.9% lock flush 3 milliLiter(s) IV Push every 8 hours  sodium chloride 0.9%. 1000 milliLiter(s) (10 mL/Hr) IV Continuous <Continuous>  testosterone patch 4 mG/24 Hr(s) 4 milliGRAM(s) Transdermal daily  thiamine 100 milliGRAM(s) Oral daily    MEDICATIONS  (PRN):  dextrose Oral Gel 15 Gram(s) Oral once PRN Blood Glucose LESS THAN 70 milliGRAM(s)/deciliter  fentaNYL    Injectable 25 MICROGram(s) IV Push every 3 hours PRN Severe Pain (7 - 10)          61y old  Male admitted with aortic dissection.  S/P aortic root reconstruction.  S/P Replacement of AA and Hemiarch.   Renal failure is getting better.  Hypernatremia  Hemodynamically stable.  Good oxygenation.  Fair urine out put.        My plan includes :  Statin Rx.  PO labetalol.    Close hemodynamic, ventilatory and drain monitoring and management  Monitor for arrhythmias and monitor parameters for organ perfusion  Monitor neurologic status  Monitor renal function.  Head of the bed should remain elevated to 45 deg .   Chest PT and IS will be encouraged  Monitor adequacy of oxygenation and ventilation and attempt to wean oxygen  Nutritional goals will be met using po eventually , ensure adequate caloric intake and monitor the same  Stress ulcer and VTE prophylaxis will be achieved    Glycemic control is satisfactory  Electrolytes have been repleted as necessary and wound care has been carried out. Pain control has been achieved.   Aggressive physical therapy and early mobility and ambulation goals will be met   The family was updated about the course and plan  CRITICAL CARE TIME SPENT in evaluation and management, reassessments, review and interpretation of labs and x-rays, ventilator and hemodynamic management, formulating a plan and coordinating care: ___90____ MIN.  Time does not include procedural time.  CTICU ATTENDING     					    Deshaun Muller MD

## 2022-05-29 NOTE — PROGRESS NOTE ADULT - SUBJECTIVE AND OBJECTIVE BOX
CTICU  CRITICAL  CARE  attending     Hand off received 					   Pertinent clinical, laboratory, radiographic, hemodynamic, echocardiographic, respiratory data, microbiologic data and chart were reviewed and analyzed frequently throughout the course of the day and night    61 year old male with HTN, HLD, Current smoker (30 pack year history), admitted to outside hospital with CP on 5/16/2022 ,   Cardiac cath showed non-obstructive CAD, EF without valvulopathy and normal EF.  On the day of discharge (5/21/2022) patient found to be febrile and underwent infectious work-up.   CT chest shows thoracic aortic changes with follow up CTA chest showing chronic thrombosed dissection versus intramural hematoma along the thoracic aorta.   There is small pericardial effusion and left pleural effusion vs hemothorax.  The patient was transferred to [facility] under the care of Dr. Ford for surgical evaluation.    S/P Aortic Root Reconstruction.  S/P Replacement of AA and Hemiarch.      FAMILY HISTORY:  No pertinent family history in first degree relatives    PAST MEDICAL & SURGICAL HISTORY:  HTN (hypertension)  HLD (hyperlipidemia)  Smoker  No significant past surgical history          14 system review was unremarkable    Vital signs, hemodynamic and respiratory parameters were reviewed from the bedside nursing flow sheet.  ICU Vital Signs Last 24 Hrs  T(C): 36.4 (29 May 2022 17:25), Max: 36.6 (29 May 2022 01:01)  T(F): 97.6 (29 May 2022 17:25), Max: 97.9 (29 May 2022 01:01)  HR: 63 (29 May 2022 18:00) (58 - 63)  BP: 141/65 (29 May 2022 18:00) (116/64 - 164/93)  BP(mean): 95 (29 May 2022 18:00) (85 - 122)  ABP: 149/68 (29 May 2022 18:00) (139/62 - 166/79)  ABP(mean): 97 (29 May 2022 18:00) (87 - 107)  RR: 15 (29 May 2022 18:00) (14 - 20)  SpO2: 95% (29 May 2022 18:00) (94% - 100%)    Adult Advanced Hemodynamics Last 24 Hrs  CVP(mm Hg): --  CVP(cm H2O): --  CO: --  CI: --  PA: --  PA(mean): --  PCWP: --  SVR: --  SVRI: --  PVR: --  PVRI: --, ABG - ( 29 May 2022 12:24 )  pH, Arterial: 7.46  pH, Blood: x     /  pCO2: 34    /  pO2: 80    / HCO3: 24    / Base Excess: 0.8   /  SaO2: 99.0                Intake and output was reviewed and the fluid balance was calculated  Daily     Daily   I&O's Summary    28 May 2022 07:01  -  29 May 2022 07:00  --------------------------------------------------------  IN: 1325 mL / OUT: 3880 mL / NET: -2555 mL    29 May 2022 07:01  -  29 May 2022 19:31  --------------------------------------------------------  IN: 700 mL / OUT: 2310 mL / NET: -1610 mL        All lines and drain sites were assessed    Neuro: No focal motor deficit.   Neck: No JVD.  CVS: S1, S2, No S3.  Lungs: Good air entry bilaterally.  Abd: Soft. No tenderness. + Bowel sounds.  Vascular: + DP/PT.  Extremities: No edema.  Lymphatic: Normal.  Skin: No abnormalities.      labs  CBC Full  -  ( 29 May 2022 12:26 )  WBC Count : 8.31 K/uL  RBC Count : 3.12 M/uL  Hemoglobin : 9.2 g/dL  Hematocrit : 28.4 %  Platelet Count - Automated : 270 K/uL  Mean Cell Volume : 91.0 fl  Mean Cell Hemoglobin : 29.5 pg  Mean Cell Hemoglobin Concentration : 32.4 gm/dL  Auto Neutrophil # : x  Auto Lymphocyte # : x  Auto Monocyte # : x  Auto Eosinophil # : x  Auto Basophil # : x  Auto Neutrophil % : x  Auto Lymphocyte % : x  Auto Monocyte % : x  Auto Eosinophil % : x  Auto Basophil % : x    05-29    146<H>  |  112<H>  |  37<H>  ----------------------------<  161<H>  3.8   |  23  |  2.16<H>    Ca    8.7      29 May 2022 12:26  Phos  3.5     05-29  Mg     2.4     05-29    TPro  6.9  /  Alb  3.4  /  TBili  0.6  /  DBili  x   /  AST  36  /  ALT  28  /  AlkPhos  110  05-29    PT/INR - ( 29 May 2022 12:26 )   PT: 17.2 sec;   INR: 1.44          PTT - ( 29 May 2022 12:26 )  PTT:27.7 sec  The current medications were reviewed   MEDICATIONS  (STANDING):  aspirin  chewable 81 milliGRAM(s) Oral daily  chlorhexidine 2% Cloths 1 Application(s) Topical daily  dextrose 5%. 1000 milliLiter(s) (50 mL/Hr) IV Continuous <Continuous>  dextrose 5%. 1000 milliLiter(s) (100 mL/Hr) IV Continuous <Continuous>  dextrose 50% Injectable 25 Gram(s) IV Push once  dextrose 50% Injectable 12.5 Gram(s) IV Push once  dextrose 50% Injectable 25 Gram(s) IV Push once  folic acid 1 milliGRAM(s) Oral daily  furosemide   Injectable 60 milliGRAM(s) IV Push daily  glucagon  Injectable 1 milliGRAM(s) IntraMuscular once  heparin   Injectable 5000 Unit(s) SubCutaneous every 8 hours  insulin lispro (ADMELOG) corrective regimen sliding scale   SubCutaneous Before meals and at bedtime  labetalol 600 milliGRAM(s) Oral every 6 hours  modafinil 200 milliGRAM(s) Oral daily  multivitamin 1 Tablet(s) Oral daily  niCARdipine Infusion 5 mG/Hr (25 mL/Hr) IV Continuous <Continuous>  pantoprazole  Injectable 40 milliGRAM(s) IV Push daily  sertraline 25 milliGRAM(s) Oral daily  sodium chloride 0.9% lock flush 3 milliLiter(s) IV Push every 8 hours  sodium chloride 0.9%. 1000 milliLiter(s) (10 mL/Hr) IV Continuous <Continuous>  testosterone patch 4 mG/24 Hr(s) 4 milliGRAM(s) Transdermal daily  thiamine 100 milliGRAM(s) Oral daily    MEDICATIONS  (PRN):  dextrose Oral Gel 15 Gram(s) Oral once PRN Blood Glucose LESS THAN 70 milliGRAM(s)/deciliter  fentaNYL    Injectable 25 MICROGram(s) IV Push every 3 hours PRN Severe Pain (7 - 10)              61y old  Male admitted with aortic dissection.  S/P aortic root reconstruction.  S/P Replacement of AA and Hemiarch.   Improving Renal function.  Hemodynamically stable.  Good oxygenation.  Fair urine out put.  Overall doing well.      My plan includes :  Speech and Swallow evaluation.  Statin Rx.  PO labetalol.  Close hemodynamic, ventilatory and drain monitoring and management  Monitor for arrhythmias and monitor parameters for organ perfusion  Monitor neurologic status  Monitor renal function.  Head of the bed should remain elevated to 45 deg .   Chest PT and IS will be encouraged  Monitor adequacy of oxygenation and ventilation and attempt to wean oxygen  Nutritional goals will be met using po eventually , ensure adequate caloric intake and monitor the same  Stress ulcer and VTE prophylaxis will be achieved    Glycemic control is satisfactory  Electrolytes have been repleted as necessary and wound care has been carried out. Pain control has been achieved.   Aggressive physical therapy and early mobility and ambulation goals will be met   The family was updated about the course and plan  CRITICAL CARE TIME SPENT in evaluation and management, reassessments, review and interpretation of labs and x-rays, ventilator and hemodynamic management, formulating a plan and coordinating care: ___90____ MIN.  Time does not include procedural time.  CTICU ATTENDING     					    Deshaun Muller MD

## 2022-05-30 LAB
ALBUMIN SERPL ELPH-MCNC: 3.1 G/DL — LOW (ref 3.3–5)
ALBUMIN SERPL ELPH-MCNC: 3.3 G/DL — SIGNIFICANT CHANGE UP (ref 3.3–5)
ALBUMIN SERPL ELPH-MCNC: 3.6 G/DL — SIGNIFICANT CHANGE UP (ref 3.3–5)
ALP SERPL-CCNC: 113 U/L — SIGNIFICANT CHANGE UP (ref 40–120)
ALP SERPL-CCNC: 91 U/L — SIGNIFICANT CHANGE UP (ref 40–120)
ALP SERPL-CCNC: 99 U/L — SIGNIFICANT CHANGE UP (ref 40–120)
ALT FLD-CCNC: 22 U/L — SIGNIFICANT CHANGE UP (ref 10–45)
ALT FLD-CCNC: 25 U/L — SIGNIFICANT CHANGE UP (ref 10–45)
ALT FLD-CCNC: 28 U/L — SIGNIFICANT CHANGE UP (ref 10–45)
ANION GAP SERPL CALC-SCNC: 10 MMOL/L — SIGNIFICANT CHANGE UP (ref 5–17)
ANION GAP SERPL CALC-SCNC: 12 MMOL/L — SIGNIFICANT CHANGE UP (ref 5–17)
ANION GAP SERPL CALC-SCNC: 14 MMOL/L — SIGNIFICANT CHANGE UP (ref 5–17)
APTT BLD: 27.9 SEC — SIGNIFICANT CHANGE UP (ref 27.5–35.5)
APTT BLD: 28.7 SEC — SIGNIFICANT CHANGE UP (ref 27.5–35.5)
AST SERPL-CCNC: 22 U/L — SIGNIFICANT CHANGE UP (ref 10–40)
AST SERPL-CCNC: 24 U/L — SIGNIFICANT CHANGE UP (ref 10–40)
AST SERPL-CCNC: 30 U/L — SIGNIFICANT CHANGE UP (ref 10–40)
BILIRUB SERPL-MCNC: 0.6 MG/DL — SIGNIFICANT CHANGE UP (ref 0.2–1.2)
BILIRUB SERPL-MCNC: 0.6 MG/DL — SIGNIFICANT CHANGE UP (ref 0.2–1.2)
BILIRUB SERPL-MCNC: 0.7 MG/DL — SIGNIFICANT CHANGE UP (ref 0.2–1.2)
BUN SERPL-MCNC: 29 MG/DL — HIGH (ref 7–23)
BUN SERPL-MCNC: 30 MG/DL — HIGH (ref 7–23)
BUN SERPL-MCNC: 31 MG/DL — HIGH (ref 7–23)
CALCIUM SERPL-MCNC: 8.4 MG/DL — SIGNIFICANT CHANGE UP (ref 8.4–10.5)
CALCIUM SERPL-MCNC: 8.5 MG/DL — SIGNIFICANT CHANGE UP (ref 8.4–10.5)
CALCIUM SERPL-MCNC: 8.9 MG/DL — SIGNIFICANT CHANGE UP (ref 8.4–10.5)
CHLORIDE SERPL-SCNC: 105 MMOL/L — SIGNIFICANT CHANGE UP (ref 96–108)
CHLORIDE SERPL-SCNC: 106 MMOL/L — SIGNIFICANT CHANGE UP (ref 96–108)
CHLORIDE SERPL-SCNC: 113 MMOL/L — HIGH (ref 96–108)
CO2 SERPL-SCNC: 23 MMOL/L — SIGNIFICANT CHANGE UP (ref 22–31)
CO2 SERPL-SCNC: 24 MMOL/L — SIGNIFICANT CHANGE UP (ref 22–31)
CO2 SERPL-SCNC: 24 MMOL/L — SIGNIFICANT CHANGE UP (ref 22–31)
CREAT SERPL-MCNC: 2.04 MG/DL — HIGH (ref 0.5–1.3)
CREAT SERPL-MCNC: 2.28 MG/DL — HIGH (ref 0.5–1.3)
CREAT SERPL-MCNC: 2.45 MG/DL — HIGH (ref 0.5–1.3)
EGFR: 29 ML/MIN/1.73M2 — LOW
EGFR: 32 ML/MIN/1.73M2 — LOW
EGFR: 36 ML/MIN/1.73M2 — LOW
GAS PNL BLDA: SIGNIFICANT CHANGE UP
GLUCOSE BLDC GLUCOMTR-MCNC: 130 MG/DL — HIGH (ref 70–99)
GLUCOSE BLDC GLUCOMTR-MCNC: 133 MG/DL — HIGH (ref 70–99)
GLUCOSE BLDC GLUCOMTR-MCNC: 140 MG/DL — HIGH (ref 70–99)
GLUCOSE BLDC GLUCOMTR-MCNC: 93 MG/DL — SIGNIFICANT CHANGE UP (ref 70–99)
GLUCOSE SERPL-MCNC: 111 MG/DL — HIGH (ref 70–99)
GLUCOSE SERPL-MCNC: 122 MG/DL — HIGH (ref 70–99)
GLUCOSE SERPL-MCNC: 138 MG/DL — HIGH (ref 70–99)
HCT VFR BLD CALC: 26.3 % — LOW (ref 39–50)
HCT VFR BLD CALC: 28.5 % — LOW (ref 39–50)
HGB BLD-MCNC: 8.6 G/DL — LOW (ref 13–17)
HGB BLD-MCNC: 9.2 G/DL — LOW (ref 13–17)
INR BLD: 1.42 — HIGH (ref 0.88–1.16)
INR BLD: 1.44 — HIGH (ref 0.88–1.16)
MAGNESIUM SERPL-MCNC: 2 MG/DL — SIGNIFICANT CHANGE UP (ref 1.6–2.6)
MAGNESIUM SERPL-MCNC: 2 MG/DL — SIGNIFICANT CHANGE UP (ref 1.6–2.6)
MAGNESIUM SERPL-MCNC: 2.2 MG/DL — SIGNIFICANT CHANGE UP (ref 1.6–2.6)
MCHC RBC-ENTMCNC: 29.2 PG — SIGNIFICANT CHANGE UP (ref 27–34)
MCHC RBC-ENTMCNC: 29.7 PG — SIGNIFICANT CHANGE UP (ref 27–34)
MCHC RBC-ENTMCNC: 32.3 GM/DL — SIGNIFICANT CHANGE UP (ref 32–36)
MCHC RBC-ENTMCNC: 32.7 GM/DL — SIGNIFICANT CHANGE UP (ref 32–36)
MCV RBC AUTO: 90.5 FL — SIGNIFICANT CHANGE UP (ref 80–100)
MCV RBC AUTO: 90.7 FL — SIGNIFICANT CHANGE UP (ref 80–100)
NRBC # BLD: 0 /100 WBCS — SIGNIFICANT CHANGE UP (ref 0–0)
NRBC # BLD: 0 /100 WBCS — SIGNIFICANT CHANGE UP (ref 0–0)
PHOSPHATE SERPL-MCNC: 2.4 MG/DL — LOW (ref 2.5–4.5)
PHOSPHATE SERPL-MCNC: 2.6 MG/DL — SIGNIFICANT CHANGE UP (ref 2.5–4.5)
PHOSPHATE SERPL-MCNC: 2.9 MG/DL — SIGNIFICANT CHANGE UP (ref 2.5–4.5)
PLATELET # BLD AUTO: 235 K/UL — SIGNIFICANT CHANGE UP (ref 150–400)
PLATELET # BLD AUTO: 251 K/UL — SIGNIFICANT CHANGE UP (ref 150–400)
POTASSIUM SERPL-MCNC: 3.5 MMOL/L — SIGNIFICANT CHANGE UP (ref 3.5–5.3)
POTASSIUM SERPL-MCNC: 3.6 MMOL/L — SIGNIFICANT CHANGE UP (ref 3.5–5.3)
POTASSIUM SERPL-MCNC: 3.8 MMOL/L — SIGNIFICANT CHANGE UP (ref 3.5–5.3)
POTASSIUM SERPL-SCNC: 3.5 MMOL/L — SIGNIFICANT CHANGE UP (ref 3.5–5.3)
POTASSIUM SERPL-SCNC: 3.6 MMOL/L — SIGNIFICANT CHANGE UP (ref 3.5–5.3)
POTASSIUM SERPL-SCNC: 3.8 MMOL/L — SIGNIFICANT CHANGE UP (ref 3.5–5.3)
PROT SERPL-MCNC: 6.1 G/DL — SIGNIFICANT CHANGE UP (ref 6–8.3)
PROT SERPL-MCNC: 6.8 G/DL — SIGNIFICANT CHANGE UP (ref 6–8.3)
PROT SERPL-MCNC: 7.2 G/DL — SIGNIFICANT CHANGE UP (ref 6–8.3)
PROTHROM AB SERPL-ACNC: 16.9 SEC — HIGH (ref 10.5–13.4)
PROTHROM AB SERPL-ACNC: 17.2 SEC — HIGH (ref 10.5–13.4)
RBC # BLD: 2.9 M/UL — LOW (ref 4.2–5.8)
RBC # BLD: 3.15 M/UL — LOW (ref 4.2–5.8)
RBC # FLD: 14.6 % — HIGH (ref 10.3–14.5)
RBC # FLD: 15 % — HIGH (ref 10.3–14.5)
SODIUM SERPL-SCNC: 142 MMOL/L — SIGNIFICANT CHANGE UP (ref 135–145)
SODIUM SERPL-SCNC: 142 MMOL/L — SIGNIFICANT CHANGE UP (ref 135–145)
SODIUM SERPL-SCNC: 147 MMOL/L — HIGH (ref 135–145)
WBC # BLD: 8.09 K/UL — SIGNIFICANT CHANGE UP (ref 3.8–10.5)
WBC # BLD: 8.88 K/UL — SIGNIFICANT CHANGE UP (ref 3.8–10.5)
WBC # FLD AUTO: 8.09 K/UL — SIGNIFICANT CHANGE UP (ref 3.8–10.5)
WBC # FLD AUTO: 8.88 K/UL — SIGNIFICANT CHANGE UP (ref 3.8–10.5)

## 2022-05-30 PROCEDURE — 99232 SBSQ HOSP IP/OBS MODERATE 35: CPT

## 2022-05-30 PROCEDURE — 71045 X-RAY EXAM CHEST 1 VIEW: CPT | Mod: 26

## 2022-05-30 RX ORDER — PANTOPRAZOLE SODIUM 20 MG/1
40 TABLET, DELAYED RELEASE ORAL
Refills: 0 | Status: DISCONTINUED | OUTPATIENT
Start: 2022-05-30 | End: 2022-06-06

## 2022-05-30 RX ORDER — POTASSIUM CHLORIDE 20 MEQ
20 PACKET (EA) ORAL ONCE
Refills: 0 | Status: COMPLETED | OUTPATIENT
Start: 2022-05-30 | End: 2022-05-30

## 2022-05-30 RX ORDER — AMLODIPINE BESYLATE 2.5 MG/1
5 TABLET ORAL DAILY
Refills: 0 | Status: DISCONTINUED | OUTPATIENT
Start: 2022-05-30 | End: 2022-06-02

## 2022-05-30 RX ORDER — LABETALOL HCL 100 MG
600 TABLET ORAL EVERY 6 HOURS
Refills: 0 | Status: DISCONTINUED | OUTPATIENT
Start: 2022-05-30 | End: 2022-06-06

## 2022-05-30 RX ORDER — POTASSIUM CHLORIDE 20 MEQ
20 PACKET (EA) ORAL ONCE
Refills: 0 | Status: DISCONTINUED | OUTPATIENT
Start: 2022-05-30 | End: 2022-05-30

## 2022-05-30 RX ORDER — HYDRALAZINE HCL 50 MG
10 TABLET ORAL ONCE
Refills: 0 | Status: COMPLETED | OUTPATIENT
Start: 2022-05-30 | End: 2022-05-30

## 2022-05-30 RX ADMIN — AMLODIPINE BESYLATE 5 MILLIGRAM(S): 2.5 TABLET ORAL at 13:34

## 2022-05-30 RX ADMIN — HEPARIN SODIUM 5000 UNIT(S): 5000 INJECTION INTRAVENOUS; SUBCUTANEOUS at 22:27

## 2022-05-30 RX ADMIN — Medication 81 MILLIGRAM(S): at 13:34

## 2022-05-30 RX ADMIN — Medication 1 MILLIGRAM(S): at 13:35

## 2022-05-30 RX ADMIN — SERTRALINE 25 MILLIGRAM(S): 25 TABLET, FILM COATED ORAL at 13:48

## 2022-05-30 RX ADMIN — Medication 4 MILLIGRAM(S): at 13:41

## 2022-05-30 RX ADMIN — MODAFINIL 200 MILLIGRAM(S): 200 TABLET ORAL at 13:46

## 2022-05-30 RX ADMIN — SODIUM CHLORIDE 3 MILLILITER(S): 9 INJECTION INTRAMUSCULAR; INTRAVENOUS; SUBCUTANEOUS at 06:00

## 2022-05-30 RX ADMIN — Medication 100 MILLIGRAM(S): at 13:48

## 2022-05-30 RX ADMIN — HEPARIN SODIUM 5000 UNIT(S): 5000 INJECTION INTRAVENOUS; SUBCUTANEOUS at 14:33

## 2022-05-30 RX ADMIN — Medication 10 MILLIGRAM(S): at 18:30

## 2022-05-30 RX ADMIN — HEPARIN SODIUM 5000 UNIT(S): 5000 INJECTION INTRAVENOUS; SUBCUTANEOUS at 06:53

## 2022-05-30 RX ADMIN — CHLORHEXIDINE GLUCONATE 1 APPLICATION(S): 213 SOLUTION TOPICAL at 13:45

## 2022-05-30 RX ADMIN — SODIUM CHLORIDE 3 MILLILITER(S): 9 INJECTION INTRAMUSCULAR; INTRAVENOUS; SUBCUTANEOUS at 21:09

## 2022-05-30 RX ADMIN — Medication 4 MILLIGRAM(S): at 18:10

## 2022-05-30 RX ADMIN — PANTOPRAZOLE SODIUM 40 MILLIGRAM(S): 20 TABLET, DELAYED RELEASE ORAL at 13:48

## 2022-05-30 RX ADMIN — SODIUM CHLORIDE 3 MILLILITER(S): 9 INJECTION INTRAMUSCULAR; INTRAVENOUS; SUBCUTANEOUS at 13:49

## 2022-05-30 RX ADMIN — Medication 1 TABLET(S): at 13:39

## 2022-05-30 RX ADMIN — Medication 60 MILLIGRAM(S): at 06:52

## 2022-05-30 RX ADMIN — Medication 100 MILLIEQUIVALENT(S): at 06:53

## 2022-05-30 RX ADMIN — Medication 600 MILLIGRAM(S): at 14:51

## 2022-05-30 RX ADMIN — Medication 4 MILLIGRAM(S): at 07:32

## 2022-05-30 RX ADMIN — Medication 4 MILLIGRAM(S): at 13:40

## 2022-05-30 NOTE — SWALLOW BEDSIDE ASSESSMENT ADULT - SWALLOW EVAL: DIAGNOSIS
Clinical signs of pharyngeal phase dysphagia s/p recent endotracheal intubation. Given CT findings of chronic lacunar infarcts within the basal ganglia, it is possible that deficits were present at baseline. However, given current presentation, pt does appear to be at an increased risk for aspiration and its negative sequela. Recommend instrumental testing to further assess swallow anatomy and physiology prior to initiating PO diet.
Pt. was seen for a swallow evaluation given that pt. is no longer requiring RA and has reportedly had an improvement in medical status. Pt. presents with a grossly functional oropharyngeal swallow without clinical signs of airway protection deficits across exhaustive PO trials. Acknowledge that pt. was noted with silent aspiration with thin liquids on 5/26 FEES exam, however, pt. was administered the 3oz Welling swallow protocol which is sensitive to detecting silent aspiration given the large volume that is ingested sequentially and pt. was without signs of aspiration. Given performance on exam, clinical improvement and pt. no longer requires supplemental O2, pt. deemed appropriate to initiate a regular diet and thin liquids. Should pt. decompensate or medical status decline, maintain a low threshold for reverting to NPO. Reconsult PRN.

## 2022-05-30 NOTE — PROGRESS NOTE ADULT - SUBJECTIVE AND OBJECTIVE BOX
CTICU  CRITICAL  CARE  attending     Hand off received 					   Pertinent clinical, laboratory, radiographic, hemodynamic, echocardiographic, respiratory data, microbiologic data and chart were reviewed and analyzed frequently throughout the course of the day and night  Patient seen and examined with CTS/ SH attending at bedside      Pt is a 61y , Male, s/p acute type A dissection repair; with replacement of ascending aorta and indra arch; and root repair;    post op:    good recovery  off drips  titrating anti hypertensive meds  stable S Cr    today:    passed speech and swallow eval    issues:    episodic delirium with mild psychosis  needed a sitter last night for safety      , FAMILY HISTORY:  No pertinent family history in first degree relatives    PAST MEDICAL & SURGICAL HISTORY:  HTN (hypertension)      HLD (hyperlipidemia)      Smoker      No significant past surgical history        Patient is a 61y old  Male who presents with a chief complaint of Intramural hematoma (29 May 2022 18:19)      14 system review limited 2/2 post op morbidity    Vital signs, hemodynamic and respiratory parameters were reviewed from the bedside nursing flowsheet.  ICU Vital Signs Last 24 Hrs  T(C): 36.9 (30 May 2022 14:26), Max: 36.9 (30 May 2022 14:26)  T(F): 98.4 (30 May 2022 14:26), Max: 98.4 (30 May 2022 14:26)  HR: 71 (30 May 2022 18:00) (62 - 72)  BP: 154/100 (30 May 2022 18:00) (123/74 - 165/89)  BP(mean): 121 (30 May 2022 18:00) (94 - 121)  ABP: 148/64 (30 May 2022 11:30) (144/78 - 173/77)  ABP(mean): 88 (30 May 2022 11:30) (85 - 118)  RR: 20 (30 May 2022 19:00) (14 - 20)  SpO2: 97% (30 May 2022 18:00) (93% - 100%)    Adult Advanced Hemodynamics Last 24 Hrs  CVP(mm Hg): --  CVP(cm H2O): --  CO: --  CI: --  PA: --  PA(mean): --  PCWP: --  SVR: --  SVRI: --  PVR: --  PVRI: --, ABG - ( 30 May 2022 03:44 )  pH, Arterial: 7.47  pH, Blood: x     /  pCO2: 31    /  pO2: 101   / HCO3: 23    / Base Excess: -0.2  /  SaO2: 99.5                Intake and output was reviewed and the fluid balance was calculated  Daily     Daily   I&O's Summary    29 May 2022 07:01  -  30 May 2022 07:00  --------------------------------------------------------  IN: 1379.8 mL / OUT: 4470 mL / NET: -3090.2 mL    30 May 2022 07:01  -  30 May 2022 18:18  --------------------------------------------------------  IN: 260.5 mL / OUT: 1505 mL / NET: -1244.5 mL        All lines and drain sites were assessed  [facility]      POCT Blood Glucose.: 130 mg/dL (30 May 2022 15:45)    No acute change in mental status  Auscultation of the chest reveals equal bs  Abdomen is soft  Extremities are warm and well perfused  Wounds appear clean and unremarkable  Antibiotics are periop    labs  CBC Full  -  ( 30 May 2022 15:52 )  WBC Count : 8.88 K/uL  RBC Count : 3.15 M/uL  Hemoglobin : 9.2 g/dL  Hematocrit : 28.5 %  Platelet Count - Automated : 251 K/uL  Mean Cell Volume : 90.5 fl  Mean Cell Hemoglobin : 29.2 pg  Mean Cell Hemoglobin Concentration : 32.3 gm/dL  Auto Neutrophil # : x  Auto Lymphocyte # : x  Auto Monocyte # : x  Auto Eosinophil # : x  Auto Basophil # : x  Auto Neutrophil % : x  Auto Lymphocyte % : x  Auto Monocyte % : x  Auto Eosinophil % : x  Auto Basophil % : x    05-30    142  |  106  |  31<H>  ----------------------------<  138<H>  3.8   |  24  |  2.28<H>    Ca    8.5      30 May 2022 15:52  Phos  2.6     05-30  Mg     2.0     05-30    TPro  6.8  /  Alb  3.3  /  TBili  0.6  /  DBili  x   /  AST  24  /  ALT  25  /  AlkPhos  99  05-30    PT/INR - ( 30 May 2022 15:52 )   PT: 16.9 sec;   INR: 1.42          PTT - ( 30 May 2022 15:52 )  PTT:28.7 sec  The current medications were reviewed   MEDICATIONS  (STANDING):  amLODIPine   Tablet 5 milliGRAM(s) Oral daily  aspirin  chewable 81 milliGRAM(s) Oral daily  chlorhexidine 2% Cloths 1 Application(s) Topical daily  dextrose 5%. 1000 milliLiter(s) (50 mL/Hr) IV Continuous <Continuous>  dextrose 5%. 1000 milliLiter(s) (100 mL/Hr) IV Continuous <Continuous>  dextrose 50% Injectable 25 Gram(s) IV Push once  dextrose 50% Injectable 12.5 Gram(s) IV Push once  dextrose 50% Injectable 25 Gram(s) IV Push once  folic acid 1 milliGRAM(s) Oral daily  furosemide   Injectable 60 milliGRAM(s) IV Push daily  glucagon  Injectable 1 milliGRAM(s) IntraMuscular once  heparin   Injectable 5000 Unit(s) SubCutaneous every 8 hours  insulin lispro (ADMELOG) corrective regimen sliding scale   SubCutaneous Before meals and at bedtime  labetalol 600 milliGRAM(s) Oral every 6 hours  modafinil 200 milliGRAM(s) Oral daily  multivitamin 1 Tablet(s) Oral daily  niCARdipine Infusion 5 mG/Hr (25 mL/Hr) IV Continuous <Continuous>  pantoprazole    Tablet 40 milliGRAM(s) Oral before breakfast  sertraline 25 milliGRAM(s) Oral daily  sodium chloride 0.9% lock flush 3 milliLiter(s) IV Push every 8 hours  sodium chloride 0.9%. 1000 milliLiter(s) (10 mL/Hr) IV Continuous <Continuous>  testosterone patch 4 mG/24 Hr(s) 4 milliGRAM(s) Transdermal daily  thiamine 100 milliGRAM(s) Oral daily    MEDICATIONS  (PRN):  dextrose Oral Gel 15 Gram(s) Oral once PRN Blood Glucose LESS THAN 70 milliGRAM(s)/deciliter       PROBLEM LIST/ ASSESSMENT:  HEALTH ISSUES - PROBLEM Dx:      ,   Patient is a 61y old  Male who presents with a chief complaint of Intramural hematoma (29 May 2022 18:19)     s/p acute type A dissection repair; with replacement of ascending aorta and indra arch; and root repair;        My plan includes :    up titrate anti hypertensive meds as tolerated  avoid nephrotoxic agents    close hemodynamic, ventilatory and drain monitoring and management per post op routine    Monitor for arrhythmias and monitor parameters for organ perfusion  monitor neurologic status  Head of the bed should remain elevated to 45 deg .   chest PT and IS will be encouraged  monitor adequacy of oxygenation and ventilation and attempt to wean oxygen  Nutritional goals will be met using po eventually , ensure adequate caloric intake and montior the same  Stress ulcer and VTE prophylaxis will be achieved    Glycemic control is satisfactory  Electrolytes have been repleted as necessary and wound care has been carried out. Pain control has been achieved.   agressive physical therapy and early mobility and ambulation goals will be met   The family was updated about the course and plan  CRITICAL CARE TIME SPENT in evaluation and management, reassessments, review and interpretation of labs and x-rays, ventilator and hemodynamic management, formulating a plan and coordinating care: _25__ MIN.  Time does not include procedural time.  CTICU ATTENDING     					    Sharif Rodriguez MD

## 2022-05-30 NOTE — SWALLOW BEDSIDE ASSESSMENT ADULT - SWALLOW EVAL: RECOMMENDED DIET
NPO except for ice chips/tsp of water pending reassessment in ~24 hours + FEES if still warranted
Regular diet and thin liquids

## 2022-05-30 NOTE — SWALLOW BEDSIDE ASSESSMENT ADULT - SPECIFY REASON(S)
To assess swallow function for PO diet candidacy given pt no longer requires supplemental O2
consulted to assess swallowing s/p extubation

## 2022-05-30 NOTE — SWALLOW BEDSIDE ASSESSMENT ADULT - SLP GENERAL OBSERVATIONS
Received awake in bed, receiving 6L supplemental O2 via NC, voice is mildly dysphonic and wet at baseline; improved with a cued throat clear. A&Ox2. Initially responded with "[place]" as place of location; unclear if this was behavioral i/s/o increasing agitation. Response time was delayed and inconsistent which, again, may have been 2/2 behavior vs neurological.
Pt. received awake, alert, Ox2, follows commands, conversant

## 2022-05-30 NOTE — SWALLOW BEDSIDE ASSESSMENT ADULT - COMMENTS
Pt. is known to SLP service, most recent visit was FEES 5/26 where pt was recommended NPO with short-term alternative means of nutrition/hydration vs. small tastes of puree/mildly thick liquids when on nasal cannula for comfort feeds depending on GOC. Pt was noted with oropharyngeal dysphagia characterized by anterior loss, worsening coordination, decreased sensation, and decreased pharyngeal squeeze as pt became increasingly fatigued as the study progressed, resulting in SILENT aspiration w thin liquids and worsening post-deglutitive residue (worst with chewable solids).    Verbal clearance was obtained via PA and MD prior to evaluating the pt.

## 2022-05-30 NOTE — SWALLOW BEDSIDE ASSESSMENT ADULT - SLP PERTINENT HISTORY OF CURRENT PROBLEM
PMHx of HTN, HLD, Current smoker (30 pack year history), admitted to outside hospital with CP on 5/16/2022 , cardiac cath shows non-obstructive CAD, EF without valvulopathy and normal EF, upon discharge (5/21/2022) patient found to be febrile and underwent infectious work-up. CT chest shows thoracic aortic changes with follow up CTA chest showing chronic thrombosed dissection versus intramural hematoma along the thoracic aorta, small pericardial effusion and left pleural effusion vs hemothorax. Patient transferred to [facility] under the care of Dr. Ford for surgical evaluation. Now s/p aortic dissection repair on 5/24. Extubated earlier this morning. CT head revealed chronic Left basal ganglia lacunar infarct; chronic small vessel ischemia; no hemorrhage
61 year old male with HTN, HLD, Current smoker (30 pack year history), admitted to outside hospital with CP on 5/16/2022 , admitted with aortic dissection, S/P aortic root reconstruction, S/P Replacement of AA and Hemiarch.

## 2022-05-30 NOTE — SWALLOW BEDSIDE ASSESSMENT ADULT - CONSISTENCIES ADMINISTERED
Thin liquids via single cup sips and sequential cup sips x8oz (includes 3oz Solon), puree via tsp x2oz, easy to chew x2oz, regular x1.5 claudio crackers
thin liquid/mildly thick

## 2022-05-31 LAB
ALBUMIN SERPL ELPH-MCNC: 3.2 G/DL — LOW (ref 3.3–5)
ALP SERPL-CCNC: 99 U/L — SIGNIFICANT CHANGE UP (ref 40–120)
ALT FLD-CCNC: 24 U/L — SIGNIFICANT CHANGE UP (ref 10–45)
ANION GAP SERPL CALC-SCNC: 12 MMOL/L — SIGNIFICANT CHANGE UP (ref 5–17)
APTT BLD: 28.9 SEC — SIGNIFICANT CHANGE UP (ref 27.5–35.5)
AST SERPL-CCNC: 29 U/L — SIGNIFICANT CHANGE UP (ref 10–40)
BILIRUB SERPL-MCNC: 0.7 MG/DL — SIGNIFICANT CHANGE UP (ref 0.2–1.2)
BUN SERPL-MCNC: 31 MG/DL — HIGH (ref 7–23)
CALCIUM SERPL-MCNC: 8.5 MG/DL — SIGNIFICANT CHANGE UP (ref 8.4–10.5)
CHLORIDE SERPL-SCNC: 104 MMOL/L — SIGNIFICANT CHANGE UP (ref 96–108)
CO2 SERPL-SCNC: 23 MMOL/L — SIGNIFICANT CHANGE UP (ref 22–31)
CREAT SERPL-MCNC: 2.25 MG/DL — HIGH (ref 0.5–1.3)
EGFR: 32 ML/MIN/1.73M2 — LOW
GLUCOSE BLDC GLUCOMTR-MCNC: 102 MG/DL — HIGH (ref 70–99)
GLUCOSE BLDC GLUCOMTR-MCNC: 110 MG/DL — HIGH (ref 70–99)
GLUCOSE BLDC GLUCOMTR-MCNC: 118 MG/DL — HIGH (ref 70–99)
GLUCOSE BLDC GLUCOMTR-MCNC: 141 MG/DL — HIGH (ref 70–99)
GLUCOSE SERPL-MCNC: 107 MG/DL — HIGH (ref 70–99)
HCT VFR BLD CALC: 28 % — LOW (ref 39–50)
HGB BLD-MCNC: 9.2 G/DL — LOW (ref 13–17)
INR BLD: 1.36 — HIGH (ref 0.88–1.16)
MAGNESIUM SERPL-MCNC: 2 MG/DL — SIGNIFICANT CHANGE UP (ref 1.6–2.6)
MCHC RBC-ENTMCNC: 29.5 PG — SIGNIFICANT CHANGE UP (ref 27–34)
MCHC RBC-ENTMCNC: 32.9 GM/DL — SIGNIFICANT CHANGE UP (ref 32–36)
MCV RBC AUTO: 89.7 FL — SIGNIFICANT CHANGE UP (ref 80–100)
NRBC # BLD: 0 /100 WBCS — SIGNIFICANT CHANGE UP (ref 0–0)
PHOSPHATE SERPL-MCNC: 2.4 MG/DL — LOW (ref 2.5–4.5)
PLATELET # BLD AUTO: 250 K/UL — SIGNIFICANT CHANGE UP (ref 150–400)
POTASSIUM SERPL-MCNC: 3.9 MMOL/L — SIGNIFICANT CHANGE UP (ref 3.5–5.3)
POTASSIUM SERPL-SCNC: 3.9 MMOL/L — SIGNIFICANT CHANGE UP (ref 3.5–5.3)
PROT SERPL-MCNC: 6.4 G/DL — SIGNIFICANT CHANGE UP (ref 6–8.3)
PROTHROM AB SERPL-ACNC: 16.2 SEC — HIGH (ref 10.5–13.4)
RBC # BLD: 3.12 M/UL — LOW (ref 4.2–5.8)
RBC # FLD: 14.6 % — HIGH (ref 10.3–14.5)
SODIUM SERPL-SCNC: 139 MMOL/L — SIGNIFICANT CHANGE UP (ref 135–145)
WBC # BLD: 9.21 K/UL — SIGNIFICANT CHANGE UP (ref 3.8–10.5)
WBC # FLD AUTO: 9.21 K/UL — SIGNIFICANT CHANGE UP (ref 3.8–10.5)

## 2022-05-31 PROCEDURE — 71045 X-RAY EXAM CHEST 1 VIEW: CPT | Mod: 26,76

## 2022-05-31 RX ORDER — ACETAMINOPHEN 500 MG
650 TABLET ORAL EVERY 6 HOURS
Refills: 0 | Status: DISCONTINUED | OUTPATIENT
Start: 2022-05-31 | End: 2022-06-06

## 2022-05-31 RX ORDER — POTASSIUM CHLORIDE 20 MEQ
10 PACKET (EA) ORAL ONCE
Refills: 0 | Status: COMPLETED | OUTPATIENT
Start: 2022-05-31 | End: 2022-05-31

## 2022-05-31 RX ORDER — POTASSIUM CHLORIDE 20 MEQ
20 PACKET (EA) ORAL ONCE
Refills: 0 | Status: COMPLETED | OUTPATIENT
Start: 2022-05-31 | End: 2022-05-31

## 2022-05-31 RX ORDER — FUROSEMIDE 40 MG
40 TABLET ORAL EVERY 8 HOURS
Refills: 0 | Status: DISCONTINUED | OUTPATIENT
Start: 2022-05-31 | End: 2022-05-31

## 2022-05-31 RX ORDER — FUROSEMIDE 40 MG
40 TABLET ORAL ONCE
Refills: 0 | Status: DISCONTINUED | OUTPATIENT
Start: 2022-05-31 | End: 2022-05-31

## 2022-05-31 RX ADMIN — Medication 600 MILLIGRAM(S): at 06:21

## 2022-05-31 RX ADMIN — Medication 600 MILLIGRAM(S): at 00:06

## 2022-05-31 RX ADMIN — HEPARIN SODIUM 5000 UNIT(S): 5000 INJECTION INTRAVENOUS; SUBCUTANEOUS at 21:50

## 2022-05-31 RX ADMIN — Medication 600 MILLIGRAM(S): at 23:42

## 2022-05-31 RX ADMIN — Medication 100 MILLIEQUIVALENT(S): at 03:14

## 2022-05-31 RX ADMIN — HEPARIN SODIUM 5000 UNIT(S): 5000 INJECTION INTRAVENOUS; SUBCUTANEOUS at 06:22

## 2022-05-31 RX ADMIN — AMLODIPINE BESYLATE 5 MILLIGRAM(S): 2.5 TABLET ORAL at 06:22

## 2022-05-31 RX ADMIN — Medication 100 MILLIGRAM(S): at 11:44

## 2022-05-31 RX ADMIN — SODIUM CHLORIDE 3 MILLILITER(S): 9 INJECTION INTRAMUSCULAR; INTRAVENOUS; SUBCUTANEOUS at 14:06

## 2022-05-31 RX ADMIN — Medication 1 PATCH: at 07:16

## 2022-05-31 RX ADMIN — SERTRALINE 25 MILLIGRAM(S): 25 TABLET, FILM COATED ORAL at 11:44

## 2022-05-31 RX ADMIN — HEPARIN SODIUM 5000 UNIT(S): 5000 INJECTION INTRAVENOUS; SUBCUTANEOUS at 14:19

## 2022-05-31 RX ADMIN — Medication 1 PATCH: at 19:54

## 2022-05-31 RX ADMIN — Medication 100 MILLIEQUIVALENT(S): at 00:46

## 2022-05-31 RX ADMIN — Medication 1 TABLET(S): at 11:44

## 2022-05-31 RX ADMIN — Medication 4 MILLIGRAM(S): at 07:16

## 2022-05-31 RX ADMIN — Medication 600 MILLIGRAM(S): at 18:23

## 2022-05-31 RX ADMIN — SODIUM CHLORIDE 3 MILLILITER(S): 9 INJECTION INTRAMUSCULAR; INTRAVENOUS; SUBCUTANEOUS at 06:14

## 2022-05-31 RX ADMIN — Medication 1 MILLIGRAM(S): at 11:43

## 2022-05-31 RX ADMIN — Medication 4 MILLIGRAM(S): at 12:30

## 2022-05-31 RX ADMIN — Medication 600 MILLIGRAM(S): at 11:44

## 2022-05-31 RX ADMIN — Medication 4 MILLIGRAM(S): at 19:54

## 2022-05-31 RX ADMIN — Medication 81 MILLIGRAM(S): at 11:44

## 2022-05-31 RX ADMIN — PANTOPRAZOLE SODIUM 40 MILLIGRAM(S): 20 TABLET, DELAYED RELEASE ORAL at 06:22

## 2022-05-31 RX ADMIN — Medication 60 MILLIGRAM(S): at 06:22

## 2022-05-31 RX ADMIN — SODIUM CHLORIDE 3 MILLILITER(S): 9 INJECTION INTRAMUSCULAR; INTRAVENOUS; SUBCUTANEOUS at 21:27

## 2022-05-31 NOTE — PROGRESS NOTE ADULT - ASSESSMENT
Patient is a 60 y/o male, current smoker (30 pack-year history), with PMH of HTN & HLD who presented to an outside hospital on 5/16/22 with chest pain & uncontrolled HTN, ruled in for NSTEMI, & underwent a cardiac catheterization that revealed non-obstructive CAD, EF without valvulopathy and normal EF. On day of discharge, 5/21/22, patient was febrile & underwent an infectious disease workup. 5/22/22 CT of the chest revealed thoracic aorta changes and CTA chest showed chronic thrombosed dissection versus intramural hematoma along the thoracic aorta, small pericardial effusion and left pleural effusion & patient was transferred to [facility]. 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 CTH without acute findings. Neuro was consulted for a baseline neuro exam due to weakness and being tremulous and patient was cleared for surgery. CTH on 5/23 showed no acute findings. On 5/24 the patient underwent AVR, ascending/hemiarch replacement with Dr. Ford. In the OR, he received 2L IVF, 250cc albumin, 2 plt, 4FFP, 1000 FEIBA and was transferred to the CTICU on primacor, epi & vasopressin. On POD 1, he was extubated & was evaluated by speech & swallow, he passed but PO was deferred 2/2 increased WOB. On POD 2, he was stable & lopressor was started & primacor was d/c.. On POD 3, he had increased WOB, was diuresed. On POD 4, all drains, except for claudine, were removed, he ambulated & his labetolol was up-titrated. On POD 5, he experienced delirium at night and required enhanced supervision. On POD 6, he passed speech and swallow. Today, POD 9, he remained stable and was transferred to [facility].    Plan:    Neurovascular:   -No delirium.   -Pain regimen, PRN's:    Cardiovascular:   -POD  -Hemodynamically stable. HR controlled.  -Continue to monitor HR, BP, telemetry      Respiratory:   -POD  -Oxygenating well on RA   -Encourage coughing and use of IS 10x / hr while awake.  -CXR:    GI:   -PPX:  -PO Diet:  -Bowel regimen:    Renal / :  -Monitor renal function.  -Monitor I/O's.  -BUN/Cr:    Endocrine:    -A1c:  -TSH:    Hematologic:  -H/H:  -Coagulation Panel:    ID:  -Temperature:   -WBC:  -Observe for SIRS/Sepsis Syndrome.    Prophylaxis:  -DVT prophylaxis with 5000 SubQ Heparin q8h  -Continue with SCD's    Disposition:  -Discharge home when medically appropriate  Patient is a 62 y/o male, current smoker (30 pack-year history), with PMH of HTN & HLD who presented to an outside hospital on 5/16/22 with chest pain & uncontrolled HTN, ruled in for NSTEMI, & underwent a cardiac catheterization that revealed non-obstructive CAD, EF without valvulopathy and normal EF. On day of discharge, 5/21/22, patient was febrile & underwent an infectious disease workup. 5/22/22 CT of the chest revealed thoracic aorta changes and CTA chest showed chronic thrombosed dissection versus intramural hematoma along the thoracic aorta, small pericardial effusion and left pleural effusion & patient was transferred to [facility]. 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 CTH without acute findings. Neuro was consulted for a baseline neuro exam due to weakness and being tremulous and patient was cleared for surgery. CTH on 5/23 showed no acute findings. On 5/24 the patient underwent ascending/hemiarch replacement with Dr. Ford. In the OR, he received 2L IVF, 250cc albumin, 2 plt, 4FFP, 1000 FEIBA and was transferred to the CTICU on primacor, epi & vasopressin. On POD 1, he was extubated & was evaluated by speech & swallow, he passed but PO was deferred 2/2 increased WOB. On POD 2, he was stable & lopressor was started & primacor was d/c.. On POD 3, he had increased WOB, was diuresed, NGT placed & feeds started. On POD 4, all drains, except for claudine, were removed, he ambulated & his labetolol was up-titrated. On POD 5, he experienced delirium at night and required enhanced supervision. On POD 6, he passed speech and swallow. Today, POD 7, he remained stable and was transferred to [facility].    Plan:    Neurovascular:   -No delirium.   -Pain regimen, PRN's: Tylenol     Cardiovascular:   - Intramural hematoma: POD 7  Ascending/hemiarch reconstruction and replacement. EF 55-60%.   - Aspirin 81mg, Labetolol 600mg q6hr, Lasix 60mg IVP daily, amlodipine 5mg daily  -Hemodynamically stable. HR controlled.  -Continue to monitor HR, BP, telemetry      Respiratory:   -Oxygenating well on %  -Encourage coughing and use of IS 10x / hr while awake.  -CXR: bilateral pleural effusions/atelectasis     GI:   -PPX: Protonix  -PO Diet: DASH/TLC  - LBM: 5/31    Renal / :   - Acute vs chronic kidney injury - Unknown baseline Cr (2.29 on admission)   -Monitor renal function.  -Monitor I/O's.  -BUN/Cr: 31 & 2.25    Endocrine: No history of DM or thyroid disease   -A1c: 5.1  - Sliding scale lispro   -TSH: WNL    Hematologic:  -H/H: 9.2 & 28  -Coagulation Panel: aptt 28.9 PT 16.2 INR 1.36    ID:  -Temperature: afebrile 36.6  -WBC: 9.21  -Observe for SIRS/Sepsis Syndrome.    Prophylaxis:  -DVT prophylaxis with 5000 SubQ Heparin q8h  -Continue with SCD's    Disposition:  -Discharge home when medically appropriate

## 2022-05-31 NOTE — PROGRESS NOTE ADULT - SUBJECTIVE AND OBJECTIVE BOX
Patient discussed on morning rounds with Dr. Ford      Operation / Date: 5/24/22 Ascending/hemiarch reconstruction and replacement. EF 55-60%.     SUBJECTIVE ASSESSMENT:  61y Male         Vital Signs Last 24 Hrs  T(C): 36.8 (31 May 2022 17:12), Max: 37.3 (31 May 2022 01:38)  T(F): 98.2 (31 May 2022 17:12), Max: 99.2 (31 May 2022 01:38)  HR: 66 (31 May 2022 16:00) (62 - 78)  BP: 168/90 (31 May 2022 16:00) (120/69 - 171/95)  BP(mean): 123 (31 May 2022 16:00) (88 - 127)  RR: 18 (31 May 2022 16:00) (16 - 20)  SpO2: 98% (31 May 2022 16:00) (94% - 100%)  I&O's Detail    30 May 2022 07:01  -  31 May 2022 07:00  --------------------------------------------------------  IN:    Dexmedetomidine: 20.5 mL    IV PiggyBack: 100 mL    NiCARdipine: 150 mL    Oral Fluid: 855 mL    sodium chloride 0.9%: 90 mL  Total IN: 1215.5 mL    OUT:    Bulb (mL): 0 mL    Indwelling Catheter - Urethral (mL): 1325 mL    Voided (mL): 1580 mL  Total OUT: 2905 mL    Total NET: -1689.5 mL      31 May 2022 07:01  -  31 May 2022 17:18  --------------------------------------------------------  IN:    Oral Fluid: 340 mL  Total IN: 340 mL    OUT:    Bulb (mL): 30 mL    Voided (mL): 1310 mL  Total OUT: 1340 mL    Total NET: -1000 mL          CHEST TUBE:  No.   ALISA DRAIN:  Yes.  EPICARDIAL WIRES: Yes.  TIE DOWNS: Yes.  HAIR: No.    PHYSICAL EXAM:    Appearance: No acute distress.  Neurologic: AAOx3, no AMS or focal deficits.  Responds appropriately to verbal and physical stimuli; exhibits purposeful movement in all extremities.  HEENT:   MMM, PERRLA, EOMI	b/l  Neck: Supple, + JVD/ - JVD; +/- Carotid Bruit   Cardiovascular: RRR, S1 S2. No m/r/g.  Respiratory: No acute respiratory distress. CTA b/l, no w/r/r.   Gastrointestinal:  Soft, non-tender, non-distended, + BS.	  Skin: No rashes. No ecchymoses. No cyanosis.  Extremities: Exhibits normal range of motion, no clubbing, cyanosis or edema.  Vascular: Peripheral pulses palpable 2+ bilaterally.   Incision Sites:    LABS:                        9.2    9.21  )-----------( 250      ( 31 May 2022 02:33 )             28.0       COUMADIN:  No.     PT/INR - ( 31 May 2022 02:33 )   PT: 16.2 sec;   INR: 1.36          PTT - ( 31 May 2022 02:33 )  PTT:28.9 sec    05-31    139  |  104  |  31<H>  ----------------------------<  107<H>  3.9   |  23  |  2.25<H>    Ca    8.5      31 May 2022 02:33  Phos  2.4     05-31  Mg     2.0     05-31    TPro  6.4  /  Alb  3.2<L>  /  TBili  0.7  /  DBili  x   /  AST  29  /  ALT  24  /  AlkPhos  99  05-31          MEDICATIONS  (STANDING):  amLODIPine   Tablet 5 milliGRAM(s) Oral daily  aspirin  chewable 81 milliGRAM(s) Oral daily  dextrose 50% Injectable 25 Gram(s) IV Push once  dextrose 50% Injectable 12.5 Gram(s) IV Push once  dextrose 50% Injectable 25 Gram(s) IV Push once  folic acid 1 milliGRAM(s) Oral daily  furosemide   Injectable 60 milliGRAM(s) IV Push daily  glucagon  Injectable 1 milliGRAM(s) IntraMuscular once  heparin   Injectable 5000 Unit(s) SubCutaneous every 8 hours  insulin lispro (ADMELOG) corrective regimen sliding scale   SubCutaneous Before meals and at bedtime  labetalol 600 milliGRAM(s) Oral every 6 hours  multivitamin 1 Tablet(s) Oral daily  pantoprazole    Tablet 40 milliGRAM(s) Oral before breakfast  sertraline 25 milliGRAM(s) Oral daily  sodium chloride 0.9% lock flush 3 milliLiter(s) IV Push every 8 hours  testosterone patch 4 mG/24 Hr(s) 4 milliGRAM(s) Transdermal daily  thiamine 100 milliGRAM(s) Oral daily    MEDICATIONS  (PRN):  dextrose Oral Gel 15 Gram(s) Oral once PRN Blood Glucose LESS THAN 70 milliGRAM(s)/deciliter        RADIOLOGY & ADDITIONAL TESTS:  < from: Xray Chest 1 View- PORTABLE-Routine (Xray Chest 1 View- PORTABLE-Routine in AM.) (05.30.22 @ 05:59) >    Singlefrontal view of the chest demonstrates status post NG tube   removal. Right-sided central venous catheter sheath is unchanged. Small   bilateral effusions/atelectasis, unchanged. The cardiomediastinal   silhouette is large. Mediastinal sternotomy wires. No acute osseous   abnormalities. Overlying EKG leads and wires are noted. Mediastinal chest   tube is redemonstrated    IMPRESSION: Status post NG tube removal.    < end of copied text >       Patient discussed on morning rounds with Dr. Ford      Operation / Date: 5/24/22 Ascending/hemiarch reconstruction and replacement. EF 55-60%.     SUBJECTIVE ASSESSMENT:  61y Male assessed at bedside. Ambulated from 9E to 9La, tolerating PO. Endorses mild incisional discomfort at sternotomy site. Denies chest tightness, shortness of breath, n/v, fever/chills.        Vital Signs Last 24 Hrs  T(C): 36.8 (31 May 2022 17:12), Max: 37.3 (31 May 2022 01:38)  T(F): 98.2 (31 May 2022 17:12), Max: 99.2 (31 May 2022 01:38)  HR: 66 (31 May 2022 16:00) (62 - 78)  BP: 168/90 (31 May 2022 16:00) (120/69 - 171/95)  BP(mean): 123 (31 May 2022 16:00) (88 - 127)  RR: 18 (31 May 2022 16:00) (16 - 20)  SpO2: 98% (31 May 2022 16:00) (94% - 100%)  I&O's Detail    30 May 2022 07:01  -  31 May 2022 07:00  --------------------------------------------------------  IN:    Dexmedetomidine: 20.5 mL    IV PiggyBack: 100 mL    NiCARdipine: 150 mL    Oral Fluid: 855 mL    sodium chloride 0.9%: 90 mL  Total IN: 1215.5 mL    OUT:    Bulb (mL): 0 mL    Indwelling Catheter - Urethral (mL): 1325 mL    Voided (mL): 1580 mL  Total OUT: 2905 mL    Total NET: -1689.5 mL      31 May 2022 07:01  -  31 May 2022 17:18  --------------------------------------------------------  IN:    Oral Fluid: 340 mL  Total IN: 340 mL    OUT:    Bulb (mL): 30 mL    Voided (mL): 1310 mL  Total OUT: 1340 mL    Total NET: -1000 mL          CHEST TUBE:  No.   ALISA DRAIN:  Yes.  EPICARDIAL WIRES: Yes.  TIE DOWNS: Yes.  HAIR: No.    PHYSICAL EXAM:    Appearance: No acute distress.  Neurologic: AAOx3, no AMS or focal deficits.  Responds appropriately to verbal and physical stimuli; exhibits purposeful movement in all extremities.  Cardiovascular: RRR, S1 S2. No m/r/g.  Respiratory: No acute respiratory distress. CTA b/l, no w/r/r.   Gastrointestinal:  Soft, non-tender, non-distended, + BS.	  Skin: No rashes. No ecchymoses. No cyanosis.  Extremities: Exhibits normal range of motion, no clubbing, cyanosis or edema.  Vascular: Peripheral pulses palpable 2+ bilaterally.   Incision Sites: Midline sternotomy incision c/d/i.     LABS:                        9.2    9.21  )-----------( 250      ( 31 May 2022 02:33 )             28.0       COUMADIN:  No.     PT/INR - ( 31 May 2022 02:33 )   PT: 16.2 sec;   INR: 1.36          PTT - ( 31 May 2022 02:33 )  PTT:28.9 sec    05-31    139  |  104  |  31<H>  ----------------------------<  107<H>  3.9   |  23  |  2.25<H>    Ca    8.5      31 May 2022 02:33  Phos  2.4     05-31  Mg     2.0     05-31    TPro  6.4  /  Alb  3.2<L>  /  TBili  0.7  /  DBili  x   /  AST  29  /  ALT  24  /  AlkPhos  99  05-31          MEDICATIONS  (STANDING):  amLODIPine   Tablet 5 milliGRAM(s) Oral daily  aspirin  chewable 81 milliGRAM(s) Oral daily  dextrose 50% Injectable 25 Gram(s) IV Push once  dextrose 50% Injectable 12.5 Gram(s) IV Push once  dextrose 50% Injectable 25 Gram(s) IV Push once  folic acid 1 milliGRAM(s) Oral daily  furosemide   Injectable 60 milliGRAM(s) IV Push daily  glucagon  Injectable 1 milliGRAM(s) IntraMuscular once  heparin   Injectable 5000 Unit(s) SubCutaneous every 8 hours  insulin lispro (ADMELOG) corrective regimen sliding scale   SubCutaneous Before meals and at bedtime  labetalol 600 milliGRAM(s) Oral every 6 hours  multivitamin 1 Tablet(s) Oral daily  pantoprazole    Tablet 40 milliGRAM(s) Oral before breakfast  sertraline 25 milliGRAM(s) Oral daily  sodium chloride 0.9% lock flush 3 milliLiter(s) IV Push every 8 hours  testosterone patch 4 mG/24 Hr(s) 4 milliGRAM(s) Transdermal daily  thiamine 100 milliGRAM(s) Oral daily    MEDICATIONS  (PRN):  dextrose Oral Gel 15 Gram(s) Oral once PRN Blood Glucose LESS THAN 70 milliGRAM(s)/deciliter        RADIOLOGY & ADDITIONAL TESTS:  < from: Xray Chest 1 View- PORTABLE-Routine (Xray Chest 1 View- PORTABLE-Routine in AM.) (05.30.22 @ 05:59) >    Singlefrontal view of the chest demonstrates status post NG tube   removal. Right-sided central venous catheter sheath is unchanged. Small   bilateral effusions/atelectasis, unchanged. The cardiomediastinal   silhouette is large. Mediastinal sternotomy wires. No acute osseous   abnormalities. Overlying EKG leads and wires are noted. Mediastinal chest   tube is redemonstrated    IMPRESSION: Status post NG tube removal.    < end of copied text >

## 2022-05-31 NOTE — CHART NOTE - NSCHARTNOTEFT_GEN_A_CORE
Admitting Diagnosis:   Patient is a 61y old  Male who presents with a chief complaint of Intramural hematoma (30 May 2022 18:18)      PAST MEDICAL & SURGICAL HISTORY:  HTN (hypertension)      HLD (hyperlipidemia)      Smoker      No significant past surgical history    Current Nutrition Order: DASH/TLC diet    PO Intake: Good (%) [   ]  Fair (50-75%) [   ] Poor (<25%) [ x  ]    GI Issues: No nausea/vomiting documented at this time. Last documented bowel movement 5/28.    Pain: No noted pain at time of RD interview.    Skin Integrity: Generalized edema 1+. MSI surgical incision per chart. Crispin score: 20.    Labs:   05-31    139  |  104  |  31<H>  ----------------------------<  107<H>  3.9   |  23  |  2.25<H>    Ca    8.5      31 May 2022 02:33  Phos  2.4     05-31  Mg     2.0     05-31    TPro  6.4  /  Alb  3.2<L>  /  TBili  0.7  /  DBili  x   /  AST  29  /  ALT  24  /  AlkPhos  99  05-31    CAPILLARY BLOOD GLUCOSE      POCT Blood Glucose.: 110 mg/dL (31 May 2022 11:11)  POCT Blood Glucose.: 102 mg/dL (31 May 2022 07:12)  POCT Blood Glucose.: 93 mg/dL (30 May 2022 22:18)  POCT Blood Glucose.: 130 mg/dL (30 May 2022 15:45)      Medications:  MEDICATIONS  (STANDING):  amLODIPine   Tablet 5 milliGRAM(s) Oral daily  aspirin  chewable 81 milliGRAM(s) Oral daily  dextrose 50% Injectable 25 Gram(s) IV Push once  dextrose 50% Injectable 12.5 Gram(s) IV Push once  dextrose 50% Injectable 25 Gram(s) IV Push once  folic acid 1 milliGRAM(s) Oral daily  furosemide   Injectable 60 milliGRAM(s) IV Push daily  glucagon  Injectable 1 milliGRAM(s) IntraMuscular once  heparin   Injectable 5000 Unit(s) SubCutaneous every 8 hours  insulin lispro (ADMELOG) corrective regimen sliding scale   SubCutaneous Before meals and at bedtime  labetalol 600 milliGRAM(s) Oral every 6 hours  multivitamin 1 Tablet(s) Oral daily  pantoprazole    Tablet 40 milliGRAM(s) Oral before breakfast  sertraline 25 milliGRAM(s) Oral daily  sodium chloride 0.9% lock flush 3 milliLiter(s) IV Push every 8 hours  testosterone patch 4 mG/24 Hr(s) 4 milliGRAM(s) Transdermal daily  thiamine 100 milliGRAM(s) Oral daily    MEDICATIONS  (PRN):  dextrose Oral Gel 15 Gram(s) Oral once PRN Blood Glucose LESS THAN 70 milliGRAM(s)/deciliter    Dosing Anthropometrics:  Height for BMI (FEET)	5 Feet  Height for BMI (INCHES)	11 Inch(s)  Height for BMI (CENTIMETERS)	180.34 Centimeter(s)  Weight for BMI (lbs)	179 lb  Weight for BMI (kg)	81.2 kg  Body Mass Index	24.9  IBW: 172 pounds 104% IBW    Weight Change: No new documented weights in EMR; obtain biweekly weights to assess changes/trends.    Estimated energy needs: Based on Standards of Care pt within % IBW thus actual body weight used for all calculations. Needs adjusted for post op healing.  Estimated Energy Needs From (nasim/kg) 27  Estimated Energy Needs To (nasim/kg)	32  Estimated Energy Needs Calculated From (nasim/kg) 2189  Estimated Energy Needs Calculated To (nasim/kg)	2595    Estimated Protein Needs From (g/kg)	1  Estimated Protein Needs To (g/kg)	1.2  Estimated Protein Needs Calculated From (g/kg) 81.1  Estimated Protein Needs Calculated To (g/kg)	97.32    Estimated Fluid Needs From (ml/kg)	27  Estimated Fluid Needs To (ml/kg)	32  Estimated Fluid Needs Calculated From (ml/kg)	2189  Estimated Fluid Needs Calculated To (ml/kg)	2595    Subjective: 61 year old male with PMHx of HTN, HLD, Current smoker (30 pack year history), admitted to outside hospital with CP on 5/16/2022 , cardiac cath shows non-obstructive CAD, EF without valvulopathy and normal EF, upon discharge (5/21/2022) patient found to be febrile and underwent infectious work-up. CT chest shows thoracic aortic changes with follow up CTA chest showing chronic thrombosed dissection versus intramural hematoma along the thoracic aorta, small pericardial effusion and left pleural effusion vs hemothorax. Patient transferred to [facility] under the care of Dr. Ford for surgical evaluation.  Upon arrival patient in no acute distress and denies all pain, patient states only has history of HTN but has been off medication for 6 months as he was unable to refill his medications.  Now s/p AVR 5/24. Seen by SLP 5/30; recommending regular diet.      Pt seen at bedside for initial assessment- pt on room air. Labs reviewed 5/31; elevated BUN/Cr. Fingersticks 5/25-5/26:  mg/dL; ISS ordered. Pt reports fair PO intake since diet advancement, reports being able to complete ~1/3 of breakfast tray 5/31. Encouraged pt to meet nutritional needs as able and discussed sources of protein; amenable to education. RD deferred further diet ed at this time as pt w/ limited engagement in RD interview. Made aware RD remains available. RD to follow up per protocol. See nutrition recommendations below.      Previous Nutrition Diagnosis: Increased protein/kcal needs r/t physiological demand for nutrient AEB post op healing    Active [ x  ]  Resolved [   ]    If resolved, new PES:     Goal: Pt to meet at least 75% of nutritional needs during hospital stay consistently     Recommendations:  1. Continue current diet order   2. Encourage pt to meet nutritional needs as able   3. RD to provide diet ed as able   4. Pain and bowel regimen per team   5. Continue micronutrient supplementation as able  6. Monitor electrolytes; replete PRN    Education: Adequate PO intake post operatively    Risk Level: High [   ] Moderate [ x  ] Low [   ]

## 2022-06-01 LAB
ANION GAP SERPL CALC-SCNC: 11 MMOL/L — SIGNIFICANT CHANGE UP (ref 5–17)
BASOPHILS # BLD AUTO: 0.01 K/UL — SIGNIFICANT CHANGE UP (ref 0–0.2)
BASOPHILS NFR BLD AUTO: 0.1 % — SIGNIFICANT CHANGE UP (ref 0–2)
BUN SERPL-MCNC: 27 MG/DL — HIGH (ref 7–23)
CALCIUM SERPL-MCNC: 8.5 MG/DL — SIGNIFICANT CHANGE UP (ref 8.4–10.5)
CHLORIDE SERPL-SCNC: 108 MMOL/L — SIGNIFICANT CHANGE UP (ref 96–108)
CO2 SERPL-SCNC: 22 MMOL/L — SIGNIFICANT CHANGE UP (ref 22–31)
CREAT SERPL-MCNC: 2.33 MG/DL — HIGH (ref 0.5–1.3)
EGFR: 31 ML/MIN/1.73M2 — LOW
EOSINOPHIL # BLD AUTO: 0.1 K/UL — SIGNIFICANT CHANGE UP (ref 0–0.5)
EOSINOPHIL NFR BLD AUTO: 1.2 % — SIGNIFICANT CHANGE UP (ref 0–6)
GLUCOSE BLDC GLUCOMTR-MCNC: 106 MG/DL — HIGH (ref 70–99)
GLUCOSE BLDC GLUCOMTR-MCNC: 109 MG/DL — HIGH (ref 70–99)
GLUCOSE BLDC GLUCOMTR-MCNC: 111 MG/DL — HIGH (ref 70–99)
GLUCOSE BLDC GLUCOMTR-MCNC: 112 MG/DL — HIGH (ref 70–99)
GLUCOSE SERPL-MCNC: 104 MG/DL — HIGH (ref 70–99)
HCT VFR BLD CALC: 25.7 % — LOW (ref 39–50)
HGB BLD-MCNC: 8.4 G/DL — LOW (ref 13–17)
IMM GRANULOCYTES NFR BLD AUTO: 0.6 % — SIGNIFICANT CHANGE UP (ref 0–1.5)
LYMPHOCYTES # BLD AUTO: 1.49 K/UL — SIGNIFICANT CHANGE UP (ref 1–3.3)
LYMPHOCYTES # BLD AUTO: 18.1 % — SIGNIFICANT CHANGE UP (ref 13–44)
MAGNESIUM SERPL-MCNC: 2 MG/DL — SIGNIFICANT CHANGE UP (ref 1.6–2.6)
MCHC RBC-ENTMCNC: 29.1 PG — SIGNIFICANT CHANGE UP (ref 27–34)
MCHC RBC-ENTMCNC: 32.7 GM/DL — SIGNIFICANT CHANGE UP (ref 32–36)
MCV RBC AUTO: 88.9 FL — SIGNIFICANT CHANGE UP (ref 80–100)
MONOCYTES # BLD AUTO: 0.71 K/UL — SIGNIFICANT CHANGE UP (ref 0–0.9)
MONOCYTES NFR BLD AUTO: 8.6 % — SIGNIFICANT CHANGE UP (ref 2–14)
NEUTROPHILS # BLD AUTO: 5.86 K/UL — SIGNIFICANT CHANGE UP (ref 1.8–7.4)
NEUTROPHILS NFR BLD AUTO: 71.4 % — SIGNIFICANT CHANGE UP (ref 43–77)
NRBC # BLD: 0 /100 WBCS — SIGNIFICANT CHANGE UP (ref 0–0)
PLATELET # BLD AUTO: 231 K/UL — SIGNIFICANT CHANGE UP (ref 150–400)
POTASSIUM SERPL-MCNC: 3.7 MMOL/L — SIGNIFICANT CHANGE UP (ref 3.5–5.3)
POTASSIUM SERPL-SCNC: 3.7 MMOL/L — SIGNIFICANT CHANGE UP (ref 3.5–5.3)
RBC # BLD: 2.89 M/UL — LOW (ref 4.2–5.8)
RBC # FLD: 15.1 % — HIGH (ref 10.3–14.5)
SODIUM SERPL-SCNC: 141 MMOL/L — SIGNIFICANT CHANGE UP (ref 135–145)
WBC # BLD: 8.22 K/UL — SIGNIFICANT CHANGE UP (ref 3.8–10.5)
WBC # FLD AUTO: 8.22 K/UL — SIGNIFICANT CHANGE UP (ref 3.8–10.5)

## 2022-06-01 PROCEDURE — 71045 X-RAY EXAM CHEST 1 VIEW: CPT | Mod: 26

## 2022-06-01 RX ORDER — POTASSIUM CHLORIDE 20 MEQ
40 PACKET (EA) ORAL ONCE
Refills: 0 | Status: COMPLETED | OUTPATIENT
Start: 2022-06-01 | End: 2022-06-01

## 2022-06-01 RX ORDER — FUROSEMIDE 40 MG
40 TABLET ORAL EVERY 12 HOURS
Refills: 0 | Status: DISCONTINUED | OUTPATIENT
Start: 2022-06-02 | End: 2022-06-02

## 2022-06-01 RX ADMIN — Medication 650 MILLIGRAM(S): at 21:00

## 2022-06-01 RX ADMIN — Medication 650 MILLIGRAM(S): at 19:56

## 2022-06-01 RX ADMIN — Medication 60 MILLIGRAM(S): at 05:53

## 2022-06-01 RX ADMIN — Medication 4 MILLIGRAM(S): at 06:11

## 2022-06-01 RX ADMIN — Medication 100 MILLIGRAM(S): at 11:07

## 2022-06-01 RX ADMIN — Medication 4 MILLIGRAM(S): at 11:05

## 2022-06-01 RX ADMIN — Medication 4 MILLIGRAM(S): at 18:46

## 2022-06-01 RX ADMIN — Medication 650 MILLIGRAM(S): at 01:00

## 2022-06-01 RX ADMIN — Medication 600 MILLIGRAM(S): at 05:53

## 2022-06-01 RX ADMIN — Medication 81 MILLIGRAM(S): at 11:06

## 2022-06-01 RX ADMIN — Medication 650 MILLIGRAM(S): at 12:30

## 2022-06-01 RX ADMIN — SERTRALINE 25 MILLIGRAM(S): 25 TABLET, FILM COATED ORAL at 11:07

## 2022-06-01 RX ADMIN — Medication 1 TABLET(S): at 11:07

## 2022-06-01 RX ADMIN — Medication 600 MILLIGRAM(S): at 17:47

## 2022-06-01 RX ADMIN — PANTOPRAZOLE SODIUM 40 MILLIGRAM(S): 20 TABLET, DELAYED RELEASE ORAL at 07:28

## 2022-06-01 RX ADMIN — Medication 1 MILLIGRAM(S): at 11:07

## 2022-06-01 RX ADMIN — Medication 40 MILLIEQUIVALENT(S): at 11:06

## 2022-06-01 RX ADMIN — SODIUM CHLORIDE 3 MILLILITER(S): 9 INJECTION INTRAMUSCULAR; INTRAVENOUS; SUBCUTANEOUS at 13:04

## 2022-06-01 RX ADMIN — Medication 650 MILLIGRAM(S): at 12:07

## 2022-06-01 RX ADMIN — HEPARIN SODIUM 5000 UNIT(S): 5000 INJECTION INTRAVENOUS; SUBCUTANEOUS at 05:53

## 2022-06-01 RX ADMIN — HEPARIN SODIUM 5000 UNIT(S): 5000 INJECTION INTRAVENOUS; SUBCUTANEOUS at 22:37

## 2022-06-01 RX ADMIN — AMLODIPINE BESYLATE 5 MILLIGRAM(S): 2.5 TABLET ORAL at 05:54

## 2022-06-01 RX ADMIN — Medication 650 MILLIGRAM(S): at 00:06

## 2022-06-01 RX ADMIN — SODIUM CHLORIDE 3 MILLILITER(S): 9 INJECTION INTRAMUSCULAR; INTRAVENOUS; SUBCUTANEOUS at 22:31

## 2022-06-01 RX ADMIN — HEPARIN SODIUM 5000 UNIT(S): 5000 INJECTION INTRAVENOUS; SUBCUTANEOUS at 13:08

## 2022-06-01 RX ADMIN — Medication 600 MILLIGRAM(S): at 11:06

## 2022-06-01 RX ADMIN — SODIUM CHLORIDE 3 MILLILITER(S): 9 INJECTION INTRAMUSCULAR; INTRAVENOUS; SUBCUTANEOUS at 06:11

## 2022-06-01 NOTE — PROGRESS NOTE ADULT - SUBJECTIVE AND OBJECTIVE BOX
Patient discussed on morning rounds with Dr. Ford      Operation / Date: 5/24/22 Ascending/hemiarch reconstruction and replacement    SUBJECTIVE ASSESSMENT:  61y Male seen and examined at bedside.  Patient reports no complaints.  Denies chest pain, shortness of breath, nausea, vomiting.        Vital Signs Last 24 Hrs  T(C): 36.7 (01 Jun 2022 08:55), Max: 37 (31 May 2022 20:59)  T(F): 98 (01 Jun 2022 08:55), Max: 98.6 (31 May 2022 20:59)  HR: 69 (01 Jun 2022 10:25) (62 - 72)  BP: 158/87 (01 Jun 2022 10:25) (120/69 - 168/90)  BP(mean): 116 (01 Jun 2022 10:25) (90 - 126)  RR: 18 (01 Jun 2022 08:35) (15 - 18)  SpO2: 98% (01 Jun 2022 10:25) (94% - 100%)  I&O's Detail    31 May 2022 07:01  -  01 Jun 2022 07:00  --------------------------------------------------------  IN:    Oral Fluid: 580 mL  Total IN: 580 mL    OUT:    Bulb (mL): 30 mL    Voided (mL): 2460 mL  Total OUT: 2490 mL    Total NET: -1910 mL      01 Jun 2022 07:01  -  01 Jun 2022 11:52  --------------------------------------------------------  IN:  Total IN: 0 mL    OUT:    Voided (mL): 825 mL  Total OUT: 825 mL    Total NET: -825 mL          CHEST TUBE:  No.    ALISA DRAIN:  Yes.  EPICARDIAL WIRES: Yes.  TIE DOWNS: No.  HAIR: No.    PHYSICAL EXAM:    Appearance: No acute distress.  Neurologic: AAOx3, no AMS or focal deficits.  Responds appropriately to verbal and physical stimuli; exhibits purposeful movement in all extremities.  Cardiovascular: RRR, S1 S2. No m/r/g.  Respiratory: No acute respiratory distress. CTA b/l, no w/r/r.   Gastrointestinal:  Soft, non-tender, non-distended, + BS.	  Skin: No rashes. No ecchymoses. No cyanosis.  Extremities: Exhibits normal range of motion, no clubbing, cyanosis or edema.  Vascular: Peripheral pulses palpable 2+ bilaterally.   Incision Sites: Midline sternotomy incision c/d/i.     LABS:                        8.4    8.22  )-----------( 231      ( 01 Jun 2022 05:30 )             25.7       COUMADIN:  No. REASON: .    PT/INR - ( 31 May 2022 02:33 )   PT: 16.2 sec;   INR: 1.36          PTT - ( 31 May 2022 02:33 )  PTT:28.9 sec    06-01    141  |  108  |  27<H>  ----------------------------<  104<H>  3.7   |  22  |  2.33<H>    Ca    8.5      01 Jun 2022 05:30  Phos  2.4     05-31  Mg     2.0     06-01    TPro  6.4  /  Alb  3.2<L>  /  TBili  0.7  /  DBili  x   /  AST  29  /  ALT  24  /  AlkPhos  99  05-31          MEDICATIONS  (STANDING):  amLODIPine   Tablet 5 milliGRAM(s) Oral daily  aspirin  chewable 81 milliGRAM(s) Oral daily  dextrose 50% Injectable 25 Gram(s) IV Push once  dextrose 50% Injectable 12.5 Gram(s) IV Push once  dextrose 50% Injectable 25 Gram(s) IV Push once  folic acid 1 milliGRAM(s) Oral daily  furosemide   Injectable 60 milliGRAM(s) IV Push daily  glucagon  Injectable 1 milliGRAM(s) IntraMuscular once  heparin   Injectable 5000 Unit(s) SubCutaneous every 8 hours  insulin lispro (ADMELOG) corrective regimen sliding scale   SubCutaneous Before meals and at bedtime  labetalol 600 milliGRAM(s) Oral every 6 hours  multivitamin 1 Tablet(s) Oral daily  pantoprazole    Tablet 40 milliGRAM(s) Oral before breakfast  sertraline 25 milliGRAM(s) Oral daily  sodium chloride 0.9% lock flush 3 milliLiter(s) IV Push every 8 hours  testosterone patch 4 mG/24 Hr(s) 4 milliGRAM(s) Transdermal daily  thiamine 100 milliGRAM(s) Oral daily    MEDICATIONS  (PRN):  acetaminophen     Tablet .. 650 milliGRAM(s) Oral every 6 hours PRN Mild Pain (1 - 3)  dextrose Oral Gel 15 Gram(s) Oral once PRN Blood Glucose LESS THAN 70 milliGRAM(s)/deciliter        RADIOLOGY & ADDITIONAL TESTS:  < from: Xray Chest 1 View- PORTABLE-Urgent (Xray Chest 1 View- PORTABLE-Urgent .) (05.31.22 @ 11:50) >  Single frontal view of the chest demonstrates no CHF and small bilateral   pleural effusions, unchanged. The cardiomediastinal silhouette is   enlarged. Mediastinal sternotomy wires.. No acute osseous abnormalities.   Overlying EKG leads and wiresare noted    < end of copied text >

## 2022-06-01 NOTE — CHART NOTE - NSCHARTNOTEFT_GEN_A_CORE
Exam:  US Chest    Procedure Date: 6/1/22    History: 61y Male whose CXR today shows a possible left sided pleural effusion.  Pt is POD #7 from ascending/hemiarch replacement.       Findings:                    Evaluation of the right side of the thoracic cavity demonstrates                   no noticeable pleural effusion                  Lung is freely movable with in thoracic cavity                    Evaluation of the left side of the thoracic cavity demonstrates                   small to moderate pleural effusion                  Lung is freely movable with in thoracic cavity    Impression:  small to moderate left pleural effusion

## 2022-06-01 NOTE — PROGRESS NOTE ADULT - ASSESSMENT
Patient is a 60 y/o male, current smoker (30 pack-year history), with PMH of HTN & HLD who presented to an outside hospital on 5/16/22 with chest pain & uncontrolled HTN, ruled in for NSTEMI, & underwent a cardiac catheterization that revealed non-obstructive CAD, EF without valvulopathy and normal EF. On day of discharge, 5/21/22, patient was febrile & underwent an infectious disease workup. 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 CTH without acute findings. Neuro was consulted for a baseline neuro exam due to weakness and being tremulous and patient was cleared for surgery. CTH on 5/23 showed no acute findings. On 5/24 the patient underwent ascending/hemiarch replacement with Dr. Ford. In the OR, he received 2L IVF, 250cc albumin, 2 plt, 4FFP, 1000 FEIBA and was transferred to the CTICU on primacor, epi & vasopressin. On POD 1, he was extubated & was evaluated by speech & swallow, he passed but PO was deferred 2/2 increased WOB. On POD 2, he was stable & lopressor was started & primacor was d/c.. On POD 3, he had increased WOB, was diuresed, NGT placed & feeds started. On POD 4, all drains, except for claudine, were removed, he ambulated & his labetolol was up-titrated. On POD 5, he experienced delirium at night and required enhanced supervision. On POD 6, he passed speech and swallow. Today, POD 7, he remained stable and was transferred to [facility].    Plan:    Neurovascular:   -No delirium.   -Pain regimen, PRN's: Tylenol     Cardiovascular:   - Intramural hematoma: POD 7  Ascending/hemiarch reconstruction and replacement. EF 55-60%.   - Aspirin 81mg, Labetolol 600mg q6hr, Lasix 60mg IVP daily, amlodipine 5mg daily  -Hemodynamically stable. HR controlled.  -Continue to monitor HR, BP, telemetry      Respiratory:   -Oxygenating well on %  -Encourage coughing and use of IS 10x / hr while awake.  -CXR: bilateral pleural effusions/atelectasis     GI:   -PPX: Protonix  -PO Diet: DASH/TLC  - LBM: 5/31    Renal / :   - Acute vs chronic kidney injury - Unknown baseline Cr (2.29 on admission)   -Monitor renal function.  -Monitor I/O's.  -BUN/Cr: 31 & 2.25    Endocrine: No history of DM or thyroid disease   -A1c: 5.1  - Sliding scale lispro   -TSH: WNL    Hematologic:  -H/H: 9.2 & 28  -Coagulation Panel: aptt 28.9 PT 16.2 INR 1.36    ID:  -Temperature: afebrile 36.6  -WBC: 9.21  -Observe for SIRS/Sepsis Syndrome.    Prophylaxis:  -DVT prophylaxis with 5000 SubQ Heparin q8h  -Continue with SCD's    Disposition:  -Discharge home when medically appropriate  Patient is a 60 y/o male, current smoker (30 pack-year history), with PMH of HTN & HLD who presented to an outside hospital on 5/16/22 with chest pain & uncontrolled HTN, ruled in for NSTEMI, & underwent a cardiac catheterization that revealed non-obstructive CAD, EF without valvulopathy and normal EF. On day of discharge, 5/21/22, patient was febrile & underwent an infectious disease workup. 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 CTa C/A/P on 5/23/22 revealed a [place] type A intramural hemorrhage/dissecting aneurysm of thoracic aorta extending to the suprarenal abdominal aorta. CTH without acute findings. Neuro was consulted for a baseline neuro exam due to weakness and being tremulous and patient was cleared for surgery. CTH on 5/23 showed no acute findings. On 5/24 the patient underwent ascending/hemiarch replacement with Dr. Ford. In the OR, he received 2L IVF, 250cc albumin, 2 plt, 4FFP, 1000 FEIBA and was transferred to the CTICU on primacor, epi & vasopressin. On POD 1, he was extubated & was evaluated by speech & swallow, he passed but PO was deferred 2/2 increased WOB. On POD 2, he was stable & lopressor was started & primacor was d/c.. On POD 3, he had increased WOB, was diuresed, NGT placed & feeds started. On POD 4, all drains, except for caludine, were removed, he ambulated & his labetolol was up-titrated. On POD 5, he experienced delirium at night and required enhanced supervision. On POD 6, he passed speech and swallow. Today, POD 7, he remained stable and was transferred to [facility].    Plan:    Neurovascular:   -No delirium.   -Pain regimen, PRN's: Tylenol     Cardiovascular:   - Intramural hematoma: POD 7  Ascending/hemiarch reconstruction and replacement. EF 55-60%.   - Aspirin 81mg, Labetolol 600mg q6hr, amlodipine 5mg daily, transition to Lasix 40mg BID from Jun 2nd  -Hemodynamically stable. HR controlled.  -Continue to monitor HR, BP, telemetry      Respiratory:   -Oxygenating well on %  -Encourage coughing and use of IS 10x / hr while awake.  -CXR: bilateral pleural effusions/atelectasis     GI:   -PPX: Protonix  -PO Diet: DASH/TLC  - LBM: 5/31    Renal / :   - Acute vs chronic kidney injury - Unknown baseline Cr (2.29 on admission)   -Monitor renal function.  -Monitor I/O's.  -BUN/Cr: 27/2.33    Endocrine: No history of DM or thyroid disease   -A1c: 5.1  - Sliding scale lispro   -TSH: WNL    Hematologic:  -H/H: 8.4/25.7    ID:  -Temperature: afebrile 36.6  -WBC: 8.22  -Observe for SIRS/Sepsis Syndrome.    Prophylaxis:  -DVT prophylaxis with 5000 SubQ Heparin q8h  -Continue with SCD's    Disposition:  -Discharge to [facility] when medically appropriate

## 2022-06-02 LAB
ANION GAP SERPL CALC-SCNC: 8 MMOL/L — SIGNIFICANT CHANGE UP (ref 5–17)
BUN SERPL-MCNC: 30 MG/DL — HIGH (ref 7–23)
CALCIUM SERPL-MCNC: 8.8 MG/DL — SIGNIFICANT CHANGE UP (ref 8.4–10.5)
CHLORIDE SERPL-SCNC: 104 MMOL/L — SIGNIFICANT CHANGE UP (ref 96–108)
CO2 SERPL-SCNC: 26 MMOL/L — SIGNIFICANT CHANGE UP (ref 22–31)
CREAT SERPL-MCNC: 2.69 MG/DL — HIGH (ref 0.5–1.3)
EGFR: 26 ML/MIN/1.73M2 — LOW
GLUCOSE BLDC GLUCOMTR-MCNC: 109 MG/DL — HIGH (ref 70–99)
GLUCOSE BLDC GLUCOMTR-MCNC: 113 MG/DL — HIGH (ref 70–99)
GLUCOSE BLDC GLUCOMTR-MCNC: 77 MG/DL — SIGNIFICANT CHANGE UP (ref 70–99)
GLUCOSE BLDC GLUCOMTR-MCNC: 93 MG/DL — SIGNIFICANT CHANGE UP (ref 70–99)
GLUCOSE SERPL-MCNC: 108 MG/DL — HIGH (ref 70–99)
HCT VFR BLD CALC: 28.3 % — LOW (ref 39–50)
HGB BLD-MCNC: 9.3 G/DL — LOW (ref 13–17)
MAGNESIUM SERPL-MCNC: 2.2 MG/DL — SIGNIFICANT CHANGE UP (ref 1.6–2.6)
MCHC RBC-ENTMCNC: 29.3 PG — SIGNIFICANT CHANGE UP (ref 27–34)
MCHC RBC-ENTMCNC: 32.9 GM/DL — SIGNIFICANT CHANGE UP (ref 32–36)
MCV RBC AUTO: 89.3 FL — SIGNIFICANT CHANGE UP (ref 80–100)
NRBC # BLD: 0 /100 WBCS — SIGNIFICANT CHANGE UP (ref 0–0)
PLATELET # BLD AUTO: 267 K/UL — SIGNIFICANT CHANGE UP (ref 150–400)
POTASSIUM SERPL-MCNC: 4.2 MMOL/L — SIGNIFICANT CHANGE UP (ref 3.5–5.3)
POTASSIUM SERPL-SCNC: 4.2 MMOL/L — SIGNIFICANT CHANGE UP (ref 3.5–5.3)
RBC # BLD: 3.17 M/UL — LOW (ref 4.2–5.8)
RBC # FLD: 15.3 % — HIGH (ref 10.3–14.5)
SODIUM SERPL-SCNC: 138 MMOL/L — SIGNIFICANT CHANGE UP (ref 135–145)
WBC # BLD: 8 K/UL — SIGNIFICANT CHANGE UP (ref 3.8–10.5)
WBC # FLD AUTO: 8 K/UL — SIGNIFICANT CHANGE UP (ref 3.8–10.5)

## 2022-06-02 PROCEDURE — 71045 X-RAY EXAM CHEST 1 VIEW: CPT | Mod: 26

## 2022-06-02 RX ORDER — ALBUMIN HUMAN 25 %
250 VIAL (ML) INTRAVENOUS ONCE
Refills: 0 | Status: COMPLETED | OUTPATIENT
Start: 2022-06-02 | End: 2022-06-02

## 2022-06-02 RX ORDER — AMLODIPINE BESYLATE 2.5 MG/1
2.5 TABLET ORAL ONCE
Refills: 0 | Status: COMPLETED | OUTPATIENT
Start: 2022-06-02 | End: 2022-06-02

## 2022-06-02 RX ORDER — AMLODIPINE BESYLATE 2.5 MG/1
7.5 TABLET ORAL DAILY
Refills: 0 | Status: DISCONTINUED | OUTPATIENT
Start: 2022-06-03 | End: 2022-06-05

## 2022-06-02 RX ORDER — ACETAMINOPHEN 500 MG
1000 TABLET ORAL ONCE
Refills: 0 | Status: DISCONTINUED | OUTPATIENT
Start: 2022-06-02 | End: 2022-06-06

## 2022-06-02 RX ADMIN — Medication 600 MILLIGRAM(S): at 00:40

## 2022-06-02 RX ADMIN — Medication 600 MILLIGRAM(S): at 18:00

## 2022-06-02 RX ADMIN — SODIUM CHLORIDE 3 MILLILITER(S): 9 INJECTION INTRAMUSCULAR; INTRAVENOUS; SUBCUTANEOUS at 22:25

## 2022-06-02 RX ADMIN — Medication 600 MILLIGRAM(S): at 06:51

## 2022-06-02 RX ADMIN — Medication 4 MILLIGRAM(S): at 06:58

## 2022-06-02 RX ADMIN — Medication 100 MILLIGRAM(S): at 15:21

## 2022-06-02 RX ADMIN — Medication 4 MILLIGRAM(S): at 18:01

## 2022-06-02 RX ADMIN — Medication 40 MILLIGRAM(S): at 06:50

## 2022-06-02 RX ADMIN — AMLODIPINE BESYLATE 5 MILLIGRAM(S): 2.5 TABLET ORAL at 06:50

## 2022-06-02 RX ADMIN — SODIUM CHLORIDE 3 MILLILITER(S): 9 INJECTION INTRAMUSCULAR; INTRAVENOUS; SUBCUTANEOUS at 06:58

## 2022-06-02 RX ADMIN — PANTOPRAZOLE SODIUM 40 MILLIGRAM(S): 20 TABLET, DELAYED RELEASE ORAL at 06:50

## 2022-06-02 RX ADMIN — Medication 4 MILLIGRAM(S): at 17:53

## 2022-06-02 RX ADMIN — Medication 81 MILLIGRAM(S): at 15:52

## 2022-06-02 RX ADMIN — AMLODIPINE BESYLATE 2.5 MILLIGRAM(S): 2.5 TABLET ORAL at 15:22

## 2022-06-02 RX ADMIN — Medication 1 TABLET(S): at 15:21

## 2022-06-02 RX ADMIN — HEPARIN SODIUM 5000 UNIT(S): 5000 INJECTION INTRAVENOUS; SUBCUTANEOUS at 06:51

## 2022-06-02 RX ADMIN — Medication 600 MILLIGRAM(S): at 15:20

## 2022-06-02 RX ADMIN — HEPARIN SODIUM 5000 UNIT(S): 5000 INJECTION INTRAVENOUS; SUBCUTANEOUS at 15:52

## 2022-06-02 RX ADMIN — SERTRALINE 25 MILLIGRAM(S): 25 TABLET, FILM COATED ORAL at 18:00

## 2022-06-02 RX ADMIN — Medication 125 MILLILITER(S): at 09:04

## 2022-06-02 RX ADMIN — SODIUM CHLORIDE 3 MILLILITER(S): 9 INJECTION INTRAMUSCULAR; INTRAVENOUS; SUBCUTANEOUS at 15:53

## 2022-06-02 RX ADMIN — Medication 1 MILLIGRAM(S): at 15:22

## 2022-06-02 RX ADMIN — Medication 4 MILLIGRAM(S): at 19:33

## 2022-06-02 RX ADMIN — HEPARIN SODIUM 5000 UNIT(S): 5000 INJECTION INTRAVENOUS; SUBCUTANEOUS at 22:33

## 2022-06-02 NOTE — CHART NOTE - NSCHARTNOTEFT_GEN_A_CORE
Patient was placed in bed and remained on cardiac monitor, per Dr. Ford, wire was gently retracted and removed.  No complications.  No blood loss.  Patient did not describe any symptoms of SOB or chest pain.

## 2022-06-02 NOTE — PROGRESS NOTE ADULT - SUBJECTIVE AND OBJECTIVE BOX
Patient discussed on morning rounds with Dr. Ford    Operation / Date: 5/24/22 Ascending/hemiarch reconstruction and replacement EF 55-60%    SUBJECTIVE ASSESSMENT:  61y Male seen and examined at bedside.  Patient reports no complaints.  Denies chest pain, shortness of breath, nausea, vomiting.        Vital Signs Last 24 Hrs  T(C): 36.2 (02 Jun 2022 10:05), Max: 36.8 (01 Jun 2022 22:46)  T(F): 97.2 (02 Jun 2022 10:05), Max: 98.3 (01 Jun 2022 22:46)  HR: 65 (02 Jun 2022 08:00) (64 - 71)  BP: 139/83 (02 Jun 2022 08:00) (132/80 - 162/99)  BP(mean): 106 (02 Jun 2022 08:00) (101 - 115)  RR: 18 (02 Jun 2022 08:00) (17 - 18)  SpO2: 98% (02 Jun 2022 08:00) (95% - 98%)  I&O's Detail    01 Jun 2022 07:01  -  02 Jun 2022 07:00  --------------------------------------------------------  IN:  Total IN: 0 mL    OUT:    Bulb (mL): 0 mL    Voided (mL): 2625 mL  Total OUT: 2625 mL    Total NET: -2625 mL          CHEST TUBE:  No.   ALISA DRAIN:  No.  EPICARDIAL WIRES: No.  TIE DOWNS: Yes.  HAIR: No.    PHYSICAL EXAM:    Appearance: No acute distress.  Neurologic: AAOx3, no AMS or focal deficits.  Responds appropriately to verbal and physical stimuli; exhibits purposeful movement in all extremities.  Cardiovascular: RRR, S1 S2. No m/r/g.  Respiratory: No acute respiratory distress. CTA b/l, no w/r/r.   Gastrointestinal:  Soft, non-tender, non-distended, + BS.	  Skin: No rashes. No ecchymoses. No cyanosis.  Extremities: Exhibits normal range of motion, no clubbing, cyanosis or edema.  Vascular: Peripheral pulses palpable 2+ bilaterally.   Incision Sites: Midline sternotomy incision c/d/i.       LABS:                        9.3    8.00  )-----------( 267      ( 02 Jun 2022 06:43 )             28.3       COUMADIN:  No. REASON: .        06-02    138  |  104  |  30<H>  ----------------------------<  108<H>  4.2   |  26  |  2.69<H>    Ca    8.8      02 Jun 2022 06:43  Mg     2.2     06-02            MEDICATIONS  (STANDING):  amLODIPine   Tablet 5 milliGRAM(s) Oral daily  aspirin  chewable 81 milliGRAM(s) Oral daily  dextrose 50% Injectable 25 Gram(s) IV Push once  dextrose 50% Injectable 12.5 Gram(s) IV Push once  dextrose 50% Injectable 25 Gram(s) IV Push once  folic acid 1 milliGRAM(s) Oral daily  glucagon  Injectable 1 milliGRAM(s) IntraMuscular once  heparin   Injectable 5000 Unit(s) SubCutaneous every 8 hours  insulin lispro (ADMELOG) corrective regimen sliding scale   SubCutaneous Before meals and at bedtime  labetalol 600 milliGRAM(s) Oral every 6 hours  multivitamin 1 Tablet(s) Oral daily  pantoprazole    Tablet 40 milliGRAM(s) Oral before breakfast  sertraline 25 milliGRAM(s) Oral daily  sodium chloride 0.9% lock flush 3 milliLiter(s) IV Push every 8 hours  testosterone patch 4 mG/24 Hr(s) 4 milliGRAM(s) Transdermal daily  thiamine 100 milliGRAM(s) Oral daily    MEDICATIONS  (PRN):  acetaminophen     Tablet .. 650 milliGRAM(s) Oral every 6 hours PRN Mild Pain (1 - 3)  dextrose Oral Gel 15 Gram(s) Oral once PRN Blood Glucose LESS THAN 70 milliGRAM(s)/deciliter        RADIOLOGY & ADDITIONAL TESTS:

## 2022-06-03 PROBLEM — F17.200 NICOTINE DEPENDENCE, UNSPECIFIED, UNCOMPLICATED: Chronic | Status: ACTIVE | Noted: 2022-05-22

## 2022-06-03 PROBLEM — E78.5 HYPERLIPIDEMIA, UNSPECIFIED: Chronic | Status: ACTIVE | Noted: 2022-05-22

## 2022-06-03 PROBLEM — I10 ESSENTIAL (PRIMARY) HYPERTENSION: Chronic | Status: ACTIVE | Noted: 2022-05-22

## 2022-06-03 LAB
ANION GAP SERPL CALC-SCNC: 8 MMOL/L — SIGNIFICANT CHANGE UP (ref 5–17)
BUN SERPL-MCNC: 26 MG/DL — HIGH (ref 7–23)
CALCIUM SERPL-MCNC: 9.2 MG/DL — SIGNIFICANT CHANGE UP (ref 8.4–10.5)
CHLORIDE SERPL-SCNC: 104 MMOL/L — SIGNIFICANT CHANGE UP (ref 96–108)
CO2 SERPL-SCNC: 25 MMOL/L — SIGNIFICANT CHANGE UP (ref 22–31)
CREAT SERPL-MCNC: 2.19 MG/DL — HIGH (ref 0.5–1.3)
EGFR: 33 ML/MIN/1.73M2 — LOW
GLUCOSE BLDC GLUCOMTR-MCNC: 103 MG/DL — HIGH (ref 70–99)
GLUCOSE BLDC GLUCOMTR-MCNC: 133 MG/DL — HIGH (ref 70–99)
GLUCOSE BLDC GLUCOMTR-MCNC: 95 MG/DL — SIGNIFICANT CHANGE UP (ref 70–99)
GLUCOSE BLDC GLUCOMTR-MCNC: 97 MG/DL — SIGNIFICANT CHANGE UP (ref 70–99)
GLUCOSE SERPL-MCNC: 108 MG/DL — HIGH (ref 70–99)
HCT VFR BLD CALC: 30.5 % — LOW (ref 39–50)
HGB BLD-MCNC: 9.8 G/DL — LOW (ref 13–17)
MAGNESIUM SERPL-MCNC: 2 MG/DL — SIGNIFICANT CHANGE UP (ref 1.6–2.6)
MCHC RBC-ENTMCNC: 29 PG — SIGNIFICANT CHANGE UP (ref 27–34)
MCHC RBC-ENTMCNC: 32.1 GM/DL — SIGNIFICANT CHANGE UP (ref 32–36)
MCV RBC AUTO: 90.2 FL — SIGNIFICANT CHANGE UP (ref 80–100)
NRBC # BLD: 0 /100 WBCS — SIGNIFICANT CHANGE UP (ref 0–0)
PLATELET # BLD AUTO: 299 K/UL — SIGNIFICANT CHANGE UP (ref 150–400)
POTASSIUM SERPL-MCNC: 4.9 MMOL/L — SIGNIFICANT CHANGE UP (ref 3.5–5.3)
POTASSIUM SERPL-SCNC: 4.9 MMOL/L — SIGNIFICANT CHANGE UP (ref 3.5–5.3)
RBC # BLD: 3.38 M/UL — LOW (ref 4.2–5.8)
RBC # FLD: 15.5 % — HIGH (ref 10.3–14.5)
SODIUM SERPL-SCNC: 137 MMOL/L — SIGNIFICANT CHANGE UP (ref 135–145)
WBC # BLD: 7.83 K/UL — SIGNIFICANT CHANGE UP (ref 3.8–10.5)
WBC # FLD AUTO: 7.83 K/UL — SIGNIFICANT CHANGE UP (ref 3.8–10.5)

## 2022-06-03 PROCEDURE — 71045 X-RAY EXAM CHEST 1 VIEW: CPT | Mod: 26

## 2022-06-03 PROCEDURE — 93308 TTE F-UP OR LMTD: CPT | Mod: 26

## 2022-06-03 RX ORDER — FUROSEMIDE 40 MG
40 TABLET ORAL DAILY
Refills: 0 | Status: DISCONTINUED | OUTPATIENT
Start: 2022-06-03 | End: 2022-06-06

## 2022-06-03 RX ADMIN — AMLODIPINE BESYLATE 7.5 MILLIGRAM(S): 2.5 TABLET ORAL at 05:26

## 2022-06-03 RX ADMIN — Medication 40 MILLIGRAM(S): at 11:56

## 2022-06-03 RX ADMIN — Medication 4 MILLIGRAM(S): at 18:51

## 2022-06-03 RX ADMIN — Medication 600 MILLIGRAM(S): at 05:25

## 2022-06-03 RX ADMIN — SODIUM CHLORIDE 3 MILLILITER(S): 9 INJECTION INTRAMUSCULAR; INTRAVENOUS; SUBCUTANEOUS at 13:59

## 2022-06-03 RX ADMIN — Medication 100 MILLIGRAM(S): at 11:44

## 2022-06-03 RX ADMIN — Medication 1 TABLET(S): at 11:43

## 2022-06-03 RX ADMIN — Medication 650 MILLIGRAM(S): at 22:00

## 2022-06-03 RX ADMIN — Medication 81 MILLIGRAM(S): at 11:44

## 2022-06-03 RX ADMIN — SODIUM CHLORIDE 3 MILLILITER(S): 9 INJECTION INTRAMUSCULAR; INTRAVENOUS; SUBCUTANEOUS at 21:22

## 2022-06-03 RX ADMIN — Medication 600 MILLIGRAM(S): at 11:44

## 2022-06-03 RX ADMIN — HEPARIN SODIUM 5000 UNIT(S): 5000 INJECTION INTRAVENOUS; SUBCUTANEOUS at 21:21

## 2022-06-03 RX ADMIN — HEPARIN SODIUM 5000 UNIT(S): 5000 INJECTION INTRAVENOUS; SUBCUTANEOUS at 05:25

## 2022-06-03 RX ADMIN — Medication 4 MILLIGRAM(S): at 05:37

## 2022-06-03 RX ADMIN — Medication 4 MILLIGRAM(S): at 11:44

## 2022-06-03 RX ADMIN — SERTRALINE 25 MILLIGRAM(S): 25 TABLET, FILM COATED ORAL at 11:44

## 2022-06-03 RX ADMIN — Medication 600 MILLIGRAM(S): at 00:15

## 2022-06-03 RX ADMIN — Medication 600 MILLIGRAM(S): at 18:49

## 2022-06-03 RX ADMIN — SODIUM CHLORIDE 3 MILLILITER(S): 9 INJECTION INTRAMUSCULAR; INTRAVENOUS; SUBCUTANEOUS at 05:22

## 2022-06-03 RX ADMIN — Medication 1 MILLIGRAM(S): at 11:44

## 2022-06-03 RX ADMIN — PANTOPRAZOLE SODIUM 40 MILLIGRAM(S): 20 TABLET, DELAYED RELEASE ORAL at 05:24

## 2022-06-03 RX ADMIN — Medication 650 MILLIGRAM(S): at 21:19

## 2022-06-03 RX ADMIN — HEPARIN SODIUM 5000 UNIT(S): 5000 INJECTION INTRAVENOUS; SUBCUTANEOUS at 18:49

## 2022-06-03 NOTE — PROGRESS NOTE ADULT - SUBJECTIVE AND OBJECTIVE BOX
Patient discussed on morning rounds with        Operation / Date: 5/24/22 Ascending/hemiarch reconstruction and replacement EF 55-60%    SUBJECTIVE ASSESSMENT:  61y Male seen and examined at bedside.  Patient with no complaints.  Denies chest pain, shortness of breath, nausea, vomiting.          Vital Signs Last 24 Hrs  T(C): 37.1 (03 Jun 2022 05:01), Max: 37.1 (03 Jun 2022 05:01)  T(F): 98.7 (03 Jun 2022 05:01), Max: 98.7 (03 Jun 2022 05:01)  HR: 68 (03 Jun 2022 04:09) (67 - 87)  BP: 152/89 (03 Jun 2022 04:09) (129/79 - 166/98)  BP(mean): 115 (03 Jun 2022 04:09) (98 - 125)  RR: 15 (03 Jun 2022 04:09) (15 - 18)  SpO2: 98% (03 Jun 2022 04:09) (96% - 100%)  I&O's Detail    02 Jun 2022 07:01  -  03 Jun 2022 07:00  --------------------------------------------------------  IN:  Total IN: 0 mL    OUT:    Voided (mL): 700 mL  Total OUT: 700 mL    Total NET: -700 mL          CHEST TUBE:  No.   ALISA DRAIN:  No.  EPICARDIAL WIRES: No.  TIE DOWNS: No.  HAIR: No.    PHYSICAL EXAM:    Appearance: No acute distress.  Neurologic: AAOx3, no AMS or focal deficits.  Responds appropriately to verbal and physical stimuli; exhibits purposeful movement in all extremities.  Cardiovascular: RRR, S1 S2. No m/r/g.  Respiratory: No acute respiratory distress. CTA b/l, no w/r/r.   Gastrointestinal:  Soft, non-tender, non-distended, + BS.	  Skin: No rashes. No ecchymoses. No cyanosis.  Extremities: Exhibits normal range of motion, no clubbing, cyanosis or edema.  Vascular: Peripheral pulses palpable 2+ bilaterally.   Incision Sites: Midline sternotomy incision c/d/i.     LABS:                        9.3    8.00  )-----------( 267      ( 02 Jun 2022 06:43 )             28.3       COUMADIN:  No. REASON: .        06-02    138  |  104  |  30<H>  ----------------------------<  108<H>  4.2   |  26  |  2.69<H>    Ca    8.8      02 Jun 2022 06:43  Mg     2.2     06-02            MEDICATIONS  (STANDING):  acetaminophen   IVPB .. 1000 milliGRAM(s) IV Intermittent once  amLODIPine   Tablet 7.5 milliGRAM(s) Oral daily  aspirin  chewable 81 milliGRAM(s) Oral daily  dextrose 50% Injectable 25 Gram(s) IV Push once  dextrose 50% Injectable 12.5 Gram(s) IV Push once  dextrose 50% Injectable 25 Gram(s) IV Push once  folic acid 1 milliGRAM(s) Oral daily  glucagon  Injectable 1 milliGRAM(s) IntraMuscular once  heparin   Injectable 5000 Unit(s) SubCutaneous every 8 hours  insulin lispro (ADMELOG) corrective regimen sliding scale   SubCutaneous Before meals and at bedtime  labetalol 600 milliGRAM(s) Oral every 6 hours  multivitamin 1 Tablet(s) Oral daily  pantoprazole    Tablet 40 milliGRAM(s) Oral before breakfast  sertraline 25 milliGRAM(s) Oral daily  sodium chloride 0.9% lock flush 3 milliLiter(s) IV Push every 8 hours  testosterone patch 4 mG/24 Hr(s) 4 milliGRAM(s) Transdermal daily  thiamine 100 milliGRAM(s) Oral daily    MEDICATIONS  (PRN):  acetaminophen     Tablet .. 650 milliGRAM(s) Oral every 6 hours PRN Mild Pain (1 - 3)  dextrose Oral Gel 15 Gram(s) Oral once PRN Blood Glucose LESS THAN 70 milliGRAM(s)/deciliter        RADIOLOGY & ADDITIONAL TESTS:

## 2022-06-03 NOTE — PROGRESS NOTE ADULT - ASSESSMENT
Patient is a 62 y/o male, current smoker (30 pack-year history), with PMH of HTN & HLD who presented to an outside hospital on 5/16/22 with chest pain & uncontrolled HTN, ruled in for NSTEMI, & underwent a cardiac catheterization that revealed non-obstructive CAD, EF without valvulopathy and normal EF. On day of discharge, 5/21/22, patient was febrile & underwent an infectious disease workup. 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 CTH without acute findings. Neuro was consulted for a baseline neuro exam due to weakness and being tremulous and patient was cleared for surgery. CTH on 5/23 showed no acute findings. On 5/24 the patient underwent ascending/hemiarch replacement with Dr. Ford. In the OR, he received 2L IVF, 250cc albumin, 2 plt, 4FFP, 1000 FEIBA and was transferred to the CTICU on primacor, epi & vasopressin. On POD 1, he was extubated & was evaluated by speech & swallow, he passed but PO was deferred 2/2 increased WOB. On POD 2, he was stable & lopressor was started & primacor was d/c.. On POD 3, he had increased WOB, was diuresed, NGT placed & feeds started. On POD 4, all drains, except for claudine, were removed, he ambulated & his labetolol was up-titrated. On POD 5, he experienced delirium at night and required enhanced supervision. On POD 6, he passed speech and swallow. POD 7, he remained stable and was transferred to [facility].  POD 8 Blakes and epicardial wires removed.  POD 9 awaiting insurance auth    Plan:    Neurovascular:   -No delirium.   -Pain regimen, PRN's: Tylenol     Cardiovascular:   - Intramural hematoma: POD 8  Ascending/hemiarch reconstruction and replacement. EF 55-60%.   - Aspirin 81mg, Labetolol 600mg q6hr, amlodipine 5mg daily, Lasix 40mg QD  -Hemodynamically stable. HR controlled.  -Continue to monitor HR, BP, telemetry      Respiratory:   -Oxygenating well on %  -Encourage coughing and use of IS 10x / hr while awake.  -CXR: bilateral pleural effusions/atelectasis     GI:   -PPX: Protonix  -PO Diet: DASH/TLC  - LBM: 6/2    Renal / :   - Acute vs chronic kidney injury - Unknown baseline Cr (2.29 on admission)   -Monitor renal function.  -Monitor I/O's.  -BUN/Cr stable  Endocrine: No history of DM or thyroid disease   -A1c: 5.1  - Sliding scale lispro   -TSH: WNL    Hematologic:  -H/H stable    ID:  -Temperature: afebrile   -WBC stable    -Observe for SIRS/Sepsis Syndrome.    Prophylaxis:  -DVT prophylaxis with 5000 SubQ Heparin q8h  -Continue with SCD's    Disposition:  -Discharge to [facility] when medically appropriate

## 2022-06-04 LAB
ANION GAP SERPL CALC-SCNC: 10 MMOL/L — SIGNIFICANT CHANGE UP (ref 5–17)
BUN SERPL-MCNC: 24 MG/DL — HIGH (ref 7–23)
CALCIUM SERPL-MCNC: 9.1 MG/DL — SIGNIFICANT CHANGE UP (ref 8.4–10.5)
CHLORIDE SERPL-SCNC: 106 MMOL/L — SIGNIFICANT CHANGE UP (ref 96–108)
CO2 SERPL-SCNC: 21 MMOL/L — LOW (ref 22–31)
CREAT SERPL-MCNC: 2.13 MG/DL — HIGH (ref 0.5–1.3)
EGFR: 35 ML/MIN/1.73M2 — LOW
GLUCOSE BLDC GLUCOMTR-MCNC: 101 MG/DL — HIGH (ref 70–99)
GLUCOSE BLDC GLUCOMTR-MCNC: 145 MG/DL — HIGH (ref 70–99)
GLUCOSE BLDC GLUCOMTR-MCNC: 234 MG/DL — HIGH (ref 70–99)
GLUCOSE BLDC GLUCOMTR-MCNC: 91 MG/DL — SIGNIFICANT CHANGE UP (ref 70–99)
GLUCOSE SERPL-MCNC: 101 MG/DL — HIGH (ref 70–99)
HCT VFR BLD CALC: 28.7 % — LOW (ref 39–50)
HGB BLD-MCNC: 9.4 G/DL — LOW (ref 13–17)
MAGNESIUM SERPL-MCNC: 2 MG/DL — SIGNIFICANT CHANGE UP (ref 1.6–2.6)
MCHC RBC-ENTMCNC: 29.6 PG — SIGNIFICANT CHANGE UP (ref 27–34)
MCHC RBC-ENTMCNC: 32.8 GM/DL — SIGNIFICANT CHANGE UP (ref 32–36)
MCV RBC AUTO: 90.3 FL — SIGNIFICANT CHANGE UP (ref 80–100)
NRBC # BLD: 0 /100 WBCS — SIGNIFICANT CHANGE UP (ref 0–0)
PHOSPHATE SERPL-MCNC: 3.2 MG/DL — SIGNIFICANT CHANGE UP (ref 2.5–4.5)
PLATELET # BLD AUTO: 307 K/UL — SIGNIFICANT CHANGE UP (ref 150–400)
POTASSIUM SERPL-MCNC: 4.8 MMOL/L — SIGNIFICANT CHANGE UP (ref 3.5–5.3)
POTASSIUM SERPL-SCNC: 4.8 MMOL/L — SIGNIFICANT CHANGE UP (ref 3.5–5.3)
RBC # BLD: 3.18 M/UL — LOW (ref 4.2–5.8)
RBC # FLD: 15.4 % — HIGH (ref 10.3–14.5)
SODIUM SERPL-SCNC: 137 MMOL/L — SIGNIFICANT CHANGE UP (ref 135–145)
WBC # BLD: 7.25 K/UL — SIGNIFICANT CHANGE UP (ref 3.8–10.5)
WBC # FLD AUTO: 7.25 K/UL — SIGNIFICANT CHANGE UP (ref 3.8–10.5)

## 2022-06-04 PROCEDURE — 71045 X-RAY EXAM CHEST 1 VIEW: CPT | Mod: 26

## 2022-06-04 RX ADMIN — Medication 4: at 21:39

## 2022-06-04 RX ADMIN — Medication 650 MILLIGRAM(S): at 23:00

## 2022-06-04 RX ADMIN — SODIUM CHLORIDE 3 MILLILITER(S): 9 INJECTION INTRAMUSCULAR; INTRAVENOUS; SUBCUTANEOUS at 14:34

## 2022-06-04 RX ADMIN — Medication 650 MILLIGRAM(S): at 21:39

## 2022-06-04 RX ADMIN — Medication 600 MILLIGRAM(S): at 17:19

## 2022-06-04 RX ADMIN — Medication 4 MILLIGRAM(S): at 06:03

## 2022-06-04 RX ADMIN — SODIUM CHLORIDE 3 MILLILITER(S): 9 INJECTION INTRAMUSCULAR; INTRAVENOUS; SUBCUTANEOUS at 21:18

## 2022-06-04 RX ADMIN — AMLODIPINE BESYLATE 7.5 MILLIGRAM(S): 2.5 TABLET ORAL at 05:27

## 2022-06-04 RX ADMIN — Medication 600 MILLIGRAM(S): at 11:55

## 2022-06-04 RX ADMIN — Medication 40 MILLIGRAM(S): at 05:28

## 2022-06-04 RX ADMIN — Medication 1 MILLIGRAM(S): at 11:55

## 2022-06-04 RX ADMIN — Medication 4 MILLIGRAM(S): at 11:59

## 2022-06-04 RX ADMIN — Medication 1 TABLET(S): at 11:54

## 2022-06-04 RX ADMIN — HEPARIN SODIUM 5000 UNIT(S): 5000 INJECTION INTRAVENOUS; SUBCUTANEOUS at 05:28

## 2022-06-04 RX ADMIN — Medication 4 MILLIGRAM(S): at 11:56

## 2022-06-04 RX ADMIN — SODIUM CHLORIDE 3 MILLILITER(S): 9 INJECTION INTRAMUSCULAR; INTRAVENOUS; SUBCUTANEOUS at 05:10

## 2022-06-04 RX ADMIN — Medication 100 MILLIGRAM(S): at 11:54

## 2022-06-04 RX ADMIN — Medication 81 MILLIGRAM(S): at 11:55

## 2022-06-04 RX ADMIN — Medication 600 MILLIGRAM(S): at 23:11

## 2022-06-04 RX ADMIN — Medication 4 MILLIGRAM(S): at 18:13

## 2022-06-04 RX ADMIN — HEPARIN SODIUM 5000 UNIT(S): 5000 INJECTION INTRAVENOUS; SUBCUTANEOUS at 21:39

## 2022-06-04 RX ADMIN — SERTRALINE 25 MILLIGRAM(S): 25 TABLET, FILM COATED ORAL at 11:54

## 2022-06-04 RX ADMIN — Medication 600 MILLIGRAM(S): at 05:27

## 2022-06-04 RX ADMIN — HEPARIN SODIUM 5000 UNIT(S): 5000 INJECTION INTRAVENOUS; SUBCUTANEOUS at 14:36

## 2022-06-04 RX ADMIN — PANTOPRAZOLE SODIUM 40 MILLIGRAM(S): 20 TABLET, DELAYED RELEASE ORAL at 06:03

## 2022-06-04 RX ADMIN — Medication 600 MILLIGRAM(S): at 01:02

## 2022-06-04 NOTE — PROGRESS NOTE ADULT - SUBJECTIVE AND OBJECTIVE BOX
Patient discussed on morning rounds with Dr. Ford    Operation / Date: 5/24/22 Ascending/hemiarch reconstruction and replacement EF 55-60%    SUBJECTIVE ASSESSMENT:  61y Male seen and examined at bedside. Pulling 1500 on ISS. Patient with no complaints.        Vital Signs Last 24 Hrs  T(C): 36.2 (04 Jun 2022 16:41), Max: 37.1 (04 Jun 2022 01:01)  T(F): 97.2 (04 Jun 2022 16:41), Max: 98.8 (04 Jun 2022 01:01)  HR: 70 (04 Jun 2022 17:13) (70 - 78)  BP: 153/89 (04 Jun 2022 17:13) (109/62 - 160/86)  BP(mean): 116 (04 Jun 2022 17:13) (78 - 117)  RR: 17 (04 Jun 2022 17:13) (14 - 18)  SpO2: 98% (04 Jun 2022 17:13) (96% - 100%)  I&O's Detail    03 Jun 2022 07:01  -  04 Jun 2022 07:00  --------------------------------------------------------  IN:  Total IN: 0 mL    OUT:    Voided (mL): 1250 mL  Total OUT: 1250 mL    Total NET: -1250 mL      04 Jun 2022 07:01  -  04 Jun 2022 18:05  --------------------------------------------------------  IN:    Oral Fluid: 600 mL  Total IN: 600 mL    OUT:    Stool (mL): 1 mL    Voided (mL): 600 mL  Total OUT: 601 mL    Total NET: -1 mL          CHEST TUBE:  No  ALISA DRAIN:  No.  EPICARDIAL WIRES: No.  TIE DOWNS: Yes.  HAIR: No.    PHYSICAL EXAM:  Appearance: No acute distress.  Neurologic: AAOx3, no AMS or focal deficits.  Responds appropriately to verbal and physical stimuli; exhibits purposeful movement in all extremities.  Cardiovascular: RRR, no murmur  Respiratory: CTAB, slightly diminished on left.  Gastrointestinal:  Soft, non-tender, non-distended, + BS.	  Extremities: warm, well perfused. No LE edema.  Incisions: sternal incision CDI, no sternal click        LABS:                        9.4    7.25  )-----------( 307      ( 04 Jun 2022 06:03 )             28.7       COUMADIN:  no        06-04    137  |  106  |  24<H>  ----------------------------<  101<H>  4.8   |  21<L>  |  2.13<H>    Ca    9.1      04 Jun 2022 06:03  Phos  3.2     06-04  Mg     2.0     06-04            MEDICATIONS  (STANDING):  acetaminophen   IVPB .. 1000 milliGRAM(s) IV Intermittent once  amLODIPine   Tablet 7.5 milliGRAM(s) Oral daily  aspirin  chewable 81 milliGRAM(s) Oral daily  dextrose 50% Injectable 25 Gram(s) IV Push once  dextrose 50% Injectable 12.5 Gram(s) IV Push once  dextrose 50% Injectable 25 Gram(s) IV Push once  folic acid 1 milliGRAM(s) Oral daily  furosemide    Tablet 40 milliGRAM(s) Oral daily  glucagon  Injectable 1 milliGRAM(s) IntraMuscular once  heparin   Injectable 5000 Unit(s) SubCutaneous every 8 hours  insulin lispro (ADMELOG) corrective regimen sliding scale   SubCutaneous Before meals and at bedtime  labetalol 600 milliGRAM(s) Oral every 6 hours  multivitamin 1 Tablet(s) Oral daily  pantoprazole    Tablet 40 milliGRAM(s) Oral before breakfast  sertraline 25 milliGRAM(s) Oral daily  sodium chloride 0.9% lock flush 3 milliLiter(s) IV Push every 8 hours  thiamine 100 milliGRAM(s) Oral daily    MEDICATIONS  (PRN):  acetaminophen     Tablet .. 650 milliGRAM(s) Oral every 6 hours PRN Mild Pain (1 - 3)  dextrose Oral Gel 15 Gram(s) Oral once PRN Blood Glucose LESS THAN 70 milliGRAM(s)/deciliter        RADIOLOGY & ADDITIONAL TESTS:

## 2022-06-04 NOTE — PROGRESS NOTE ADULT - ASSESSMENT
60 y/o male, current smoker (30 pack-year history), with PMH of HTN & HLD who presented to an outside hospital on 5/16/22 with chest pain & uncontrolled HTN, ruled in for NSTEMI, & underwent a cardiac catheterization that revealed non-obstructive CAD, EF without valvulopathy and normal EF. On day of discharge, 5/21/22, patient was febrile & underwent an infectious disease workup. 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 CTa C/A/P on 5/23/22 revealed a [place] type A intramural hemorrhage/dissecting aneurysm of thoracic aorta extending to the suprarenal abdominal aorta. CTH without acute findings. Neuro was consulted for a baseline neuro exam due to weakness and being tremulous and patient was cleared for surgery. CTH on 5/23 showed no acute findings. On 5/24 the patient underwent ascending/hemiarch replacement with Dr. Ford. In the OR, he received 2L IVF, 250cc albumin, 2 plt, 4FFP, 1000 FEIBA and was transferred to the CTICU on primacor, epi & vasopressin. On POD 1, he was extubated & was evaluated by speech & swallow, he passed but PO was deferred 2/2 increased WOB. On POD 2, he was stable & lopressor was started & primacor was d/c.. On POD 3, he had increased WOB, was diuresed, NGT placed & feeds started. On POD 4, all drains, except for claudine, were removed, he ambulated & his labetolol was up-titrated. On POD 5, he experienced delirium at night and required enhanced supervision. On POD 6, he passed speech and swallow. POD 7, he remained stable and was transferred to [facility].  POD 8 Blakes and epicardial wires removed.  POD 9 awaiting insurance authorization. POD10 chest US with trace L pleural effusion, no right pleural effusion. Awaiting rehab.    Plan:    Neurovascular:   - No issues  - Pain well controlled    Cardiovascular:   - Intramural hematoma/Type A dissection now POD 8  Ascending/hemiarch reconstruction and replacement. EF 55-60%.   - Aspirin 81mg, Labetolol 600mg q6hr, amlodipine 7.5mg daily, Lasix 40mg QD  - HR 70s SR, SBP 120s-150s  - TTE on 6/3 with normal BiV function and no pericardial effusion.     Respiratory:   -Oxygenating well on %  -Encourage coughing and use of IS 10x / hr while awake.  -CXR with trace bilateral pleural effusions/atelectasis  - chest US with trace L pleural effusion, no right pleural effusion.    GI:   -PPX: Protonix  -PO Diet: DASH/TLC  - LBM: 6/2    Renal / :   - Acute vs chronic kidney injury - Unknown baseline Cr (2.29 on admission)   -Monitor renal function.  -Monitor I/O's.  -BUN/Cr stable    Endocrine: No history of DM or thyroid disease   -A1c: 5.1  - Sliding scale lispro     Hematologic:  -H/H stable    ID:  -Temperature: afebrile   -WBC stable    -Observe for SIRS/Sepsis Syndrome.    Prophylaxis:  -DVT prophylaxis with 5000 SubQ Heparin q8h  -Continue with SCD's    Disposition:  -Discharge to [facility], medically ready. Awaiting insurance authorization.

## 2022-06-05 LAB
GLUCOSE BLDC GLUCOMTR-MCNC: 130 MG/DL — HIGH (ref 70–99)
GLUCOSE BLDC GLUCOMTR-MCNC: 170 MG/DL — HIGH (ref 70–99)
GLUCOSE BLDC GLUCOMTR-MCNC: 97 MG/DL — SIGNIFICANT CHANGE UP (ref 70–99)
GLUCOSE BLDC GLUCOMTR-MCNC: 99 MG/DL — SIGNIFICANT CHANGE UP (ref 70–99)
SARS-COV-2 RNA SPEC QL NAA+PROBE: SIGNIFICANT CHANGE UP

## 2022-06-05 PROCEDURE — 71046 X-RAY EXAM CHEST 2 VIEWS: CPT | Mod: 26

## 2022-06-05 RX ORDER — AMLODIPINE BESYLATE 2.5 MG/1
10 TABLET ORAL DAILY
Refills: 0 | Status: DISCONTINUED | OUTPATIENT
Start: 2022-06-05 | End: 2022-06-05

## 2022-06-05 RX ORDER — AMLODIPINE BESYLATE 2.5 MG/1
2.5 TABLET ORAL ONCE
Refills: 0 | Status: COMPLETED | OUTPATIENT
Start: 2022-06-05 | End: 2022-06-05

## 2022-06-05 RX ORDER — AMLODIPINE BESYLATE 2.5 MG/1
10 TABLET ORAL DAILY
Refills: 0 | Status: DISCONTINUED | OUTPATIENT
Start: 2022-06-06 | End: 2022-06-06

## 2022-06-05 RX ADMIN — Medication 2: at 12:11

## 2022-06-05 RX ADMIN — Medication 81 MILLIGRAM(S): at 11:19

## 2022-06-05 RX ADMIN — Medication 4 MILLIGRAM(S): at 07:16

## 2022-06-05 RX ADMIN — Medication 650 MILLIGRAM(S): at 11:52

## 2022-06-05 RX ADMIN — Medication 650 MILLIGRAM(S): at 13:00

## 2022-06-05 RX ADMIN — Medication 1 TABLET(S): at 11:19

## 2022-06-05 RX ADMIN — SODIUM CHLORIDE 3 MILLILITER(S): 9 INJECTION INTRAMUSCULAR; INTRAVENOUS; SUBCUTANEOUS at 16:10

## 2022-06-05 RX ADMIN — Medication 40 MILLIGRAM(S): at 06:45

## 2022-06-05 RX ADMIN — Medication 600 MILLIGRAM(S): at 11:19

## 2022-06-05 RX ADMIN — Medication 4 MILLIGRAM(S): at 11:08

## 2022-06-05 RX ADMIN — SODIUM CHLORIDE 3 MILLILITER(S): 9 INJECTION INTRAMUSCULAR; INTRAVENOUS; SUBCUTANEOUS at 21:52

## 2022-06-05 RX ADMIN — Medication 100 MILLIGRAM(S): at 11:19

## 2022-06-05 RX ADMIN — Medication 600 MILLIGRAM(S): at 17:48

## 2022-06-05 RX ADMIN — SODIUM CHLORIDE 3 MILLILITER(S): 9 INJECTION INTRAMUSCULAR; INTRAVENOUS; SUBCUTANEOUS at 07:15

## 2022-06-05 RX ADMIN — PANTOPRAZOLE SODIUM 40 MILLIGRAM(S): 20 TABLET, DELAYED RELEASE ORAL at 06:45

## 2022-06-05 RX ADMIN — Medication 600 MILLIGRAM(S): at 06:45

## 2022-06-05 RX ADMIN — HEPARIN SODIUM 5000 UNIT(S): 5000 INJECTION INTRAVENOUS; SUBCUTANEOUS at 22:12

## 2022-06-05 RX ADMIN — Medication 600 MILLIGRAM(S): at 23:26

## 2022-06-05 RX ADMIN — AMLODIPINE BESYLATE 7.5 MILLIGRAM(S): 2.5 TABLET ORAL at 06:45

## 2022-06-05 RX ADMIN — SERTRALINE 25 MILLIGRAM(S): 25 TABLET, FILM COATED ORAL at 11:19

## 2022-06-05 RX ADMIN — AMLODIPINE BESYLATE 2.5 MILLIGRAM(S): 2.5 TABLET ORAL at 10:14

## 2022-06-05 RX ADMIN — HEPARIN SODIUM 5000 UNIT(S): 5000 INJECTION INTRAVENOUS; SUBCUTANEOUS at 06:45

## 2022-06-05 RX ADMIN — Medication 1 MILLIGRAM(S): at 11:20

## 2022-06-05 NOTE — PROGRESS NOTE ADULT - SUBJECTIVE AND OBJECTIVE BOX
Patient discussed on morning rounds with Dr. Ford    Operation / Date: 5/24/22 Ascending/hemiarch reconstruction and replacement EF 55-60%    SUBJECTIVE ASSESSMENT:  61y Male seen and examined. No complaints.         Vital Signs Last 24 Hrs  T(C): 36.9 (05 Jun 2022 17:33), Max: 36.9 (05 Jun 2022 17:33)  T(F): 98.4 (05 Jun 2022 17:33), Max: 98.4 (05 Jun 2022 17:33)  HR: 74 (05 Jun 2022 17:00) (68 - 77)  BP: 123/80 (05 Jun 2022 17:00) (102/62 - 157/89)  BP(mean): 97 (05 Jun 2022 17:00) (72 - 117)  RR: 16 (05 Jun 2022 17:00) (16 - 18)  SpO2: 96% (05 Jun 2022 17:00) (94% - 100%)  I&O's Detail    04 Jun 2022 07:01  -  05 Jun 2022 07:00  --------------------------------------------------------  IN:    Oral Fluid: 600 mL  Total IN: 600 mL    OUT:    Stool (mL): 1 mL    Voided (mL): 1300 mL  Total OUT: 1301 mL    Total NET: -701 mL      05 Jun 2022 07:01  -  05 Jun 2022 18:16  --------------------------------------------------------  IN:    Oral Fluid: 150 mL  Total IN: 150 mL    OUT:    Voided (mL): 200 mL  Total OUT: 200 mL    Total NET: -50 mL          CHEST TUBE:  No  ALISA DRAIN:  No.  EPICARDIAL WIRES: No.  TIE DOWNS: Yes  HAIR: No.    PHYSICAL EXAM:  Appearance: No acute distress.  Neurologic: AAOx3, no AMS or focal deficits.  Responds appropriately to verbal and physical stimuli; exhibits purposeful movement in all extremities.  Cardiovascular: RRR, no murmur  Respiratory: No acute respiratory distress. CTAB, no crackles or rhonchi   Gastrointestinal:  Soft, non-tender, non-distended, + BS.	  Extremities: warm, well perfused. No LE edema.  Incisions: sternal incision CDI, no sternal click; SVG site CDI      LABS:                        9.4    7.25  )-----------( 307      ( 04 Jun 2022 06:03 )             28.7       COUMADIN: No        06-04    137  |  106  |  24<H>  ----------------------------<  101<H>  4.8   |  21<L>  |  2.13<H>    Ca    9.1      04 Jun 2022 06:03  Phos  3.2     06-04  Mg     2.0     06-04            MEDICATIONS  (STANDING):  acetaminophen   IVPB .. 1000 milliGRAM(s) IV Intermittent once  aspirin  chewable 81 milliGRAM(s) Oral daily  dextrose 50% Injectable 25 Gram(s) IV Push once  dextrose 50% Injectable 12.5 Gram(s) IV Push once  dextrose 50% Injectable 25 Gram(s) IV Push once  folic acid 1 milliGRAM(s) Oral daily  furosemide    Tablet 40 milliGRAM(s) Oral daily  glucagon  Injectable 1 milliGRAM(s) IntraMuscular once  heparin   Injectable 5000 Unit(s) SubCutaneous every 8 hours  insulin lispro (ADMELOG) corrective regimen sliding scale   SubCutaneous Before meals and at bedtime  labetalol 600 milliGRAM(s) Oral every 6 hours  multivitamin 1 Tablet(s) Oral daily  pantoprazole    Tablet 40 milliGRAM(s) Oral before breakfast  sertraline 25 milliGRAM(s) Oral daily  sodium chloride 0.9% lock flush 3 milliLiter(s) IV Push every 8 hours  thiamine 100 milliGRAM(s) Oral daily    MEDICATIONS  (PRN):  acetaminophen     Tablet .. 650 milliGRAM(s) Oral every 6 hours PRN Mild Pain (1 - 3)  dextrose Oral Gel 15 Gram(s) Oral once PRN Blood Glucose LESS THAN 70 milliGRAM(s)/deciliter        RADIOLOGY & ADDITIONAL TESTS:

## 2022-06-05 NOTE — PROGRESS NOTE ADULT - ASSESSMENT
62 y/o male, current smoker (30 pack-year history), with PMH of HTN & HLD who presented to an outside hospital on 5/16/22 with chest pain & uncontrolled HTN, ruled in for NSTEMI, & underwent a cardiac catheterization that revealed non-obstructive CAD, EF without valvulopathy and normal EF. On day of discharge, 5/21/22, patient was febrile & underwent an infectious disease workup. 5/22/22 CT of the chest revealed thoracic aorta changes and CTA chest showed chronic thrombosed dissection versus intramural hematoma along the thoracic aorta, small pericardial effusion and left pleural effusion & patient was transferred to [facility]. CTa C/A/P on 5/23/22 revealed a [place] type A intramural hemorrhage/dissecting aneurysm of thoracic aorta extending to the suprarenal abdominal aorta. CTH without acute findings. Neuro was consulted for a baseline neuro exam due to weakness and being tremulous and patient was cleared for surgery. CTH on 5/23 showed no acute findings. On 5/24 the patient underwent ascending/hemiarch replacement with Dr. Ford. In the OR, he received 2L IVF, 250cc albumin, 2 plt, 4FFP, 1000 FEIBA and was transferred to the CTICU on primacor, epi & vasopressin. On POD 1, he was extubated & was evaluated by speech & swallow, he passed but PO was deferred 2/2 increased WOB. On POD 2, he was stable & lopressor was started & primacor was d/c.. On POD 3, he had increased WOB, was diuresed, NGT placed & feeds started. On POD 4, all drains, except for claudine, were removed, he ambulated & his labetolol was up-titrated. On POD 5, he experienced delirium at night and required enhanced supervision. On POD 6, he passed speech and swallow. POD 7, he remained stable and was transferred to [facility].  POD 8 Blakes and epicardial wires removed.  POD 9 awaiting insurance authorization. POD10 chest US with trace L pleural effusion, no right pleural effusion. Awaiting rehab.    Plan:    Neurovascular:   - No issues  - Pain well controlled    Cardiovascular:   - Intramural hematoma/Type A dissection now POD 10  Ascending/hemiarch reconstruction and replacement. EF 55-60%.   - Aspirin 81mg, Labetolol 600mg q6hr, amlodipine increased to 10mg daily, Lasix 40mg QD  - TTE on 6/3 with normal BiV function and no pericardial effusion.     Respiratory:   -Oxygenating well on %  -Encourage coughing and use of IS 10x / hr while awake.    GI:   -PPX: Protonix  -PO Diet: DASH/TLC  - LBM: 6/4    Renal / :   - Acute vs chronic kidney injury - Unknown baseline Cr (2.29 on admission)   - Monitor renal function, lab holiday  - Monitor I/O's.    Endocrine: No history of DM or thyroid disease   -A1c: 5.1  - Sliding scale lispro     Hematologic:  CBC in AM    ID:  -Temperature: afebrile     Prophylaxis:  -DVT prophylaxis with 5000 SubQ Heparin q8h  -Continue with SCD's    Disposition:  -Discharge to [facility], medically ready. Awaiting insurance authorization.

## 2022-06-06 ENCOUNTER — TRANSCRIPTION ENCOUNTER (OUTPATIENT)
Age: 62
End: 2022-06-06

## 2022-06-06 VITALS
OXYGEN SATURATION: 100 % | DIASTOLIC BLOOD PRESSURE: 71 MMHG | TEMPERATURE: 98 F | RESPIRATION RATE: 15 BRPM | SYSTOLIC BLOOD PRESSURE: 121 MMHG | HEART RATE: 70 BPM

## 2022-06-06 LAB
ANION GAP SERPL CALC-SCNC: 10 MMOL/L — SIGNIFICANT CHANGE UP (ref 5–17)
ANION GAP SERPL CALC-SCNC: 12 MMOL/L — SIGNIFICANT CHANGE UP (ref 5–17)
BUN SERPL-MCNC: 30 MG/DL — HIGH (ref 7–23)
BUN SERPL-MCNC: 32 MG/DL — HIGH (ref 7–23)
CALCIUM SERPL-MCNC: 9.2 MG/DL — SIGNIFICANT CHANGE UP (ref 8.4–10.5)
CALCIUM SERPL-MCNC: 9.2 MG/DL — SIGNIFICANT CHANGE UP (ref 8.4–10.5)
CHLORIDE SERPL-SCNC: 105 MMOL/L — SIGNIFICANT CHANGE UP (ref 96–108)
CHLORIDE SERPL-SCNC: 107 MMOL/L — SIGNIFICANT CHANGE UP (ref 96–108)
CO2 SERPL-SCNC: 19 MMOL/L — LOW (ref 22–31)
CO2 SERPL-SCNC: 21 MMOL/L — LOW (ref 22–31)
CREAT SERPL-MCNC: 2.66 MG/DL — HIGH (ref 0.5–1.3)
CREAT SERPL-MCNC: 2.95 MG/DL — HIGH (ref 0.5–1.3)
EGFR: 23 ML/MIN/1.73M2 — LOW
EGFR: 26 ML/MIN/1.73M2 — LOW
GLUCOSE BLDC GLUCOMTR-MCNC: 106 MG/DL — HIGH (ref 70–99)
GLUCOSE SERPL-MCNC: 104 MG/DL — HIGH (ref 70–99)
GLUCOSE SERPL-MCNC: 98 MG/DL — SIGNIFICANT CHANGE UP (ref 70–99)
HCT VFR BLD CALC: 27.9 % — LOW (ref 39–50)
HGB BLD-MCNC: 9 G/DL — LOW (ref 13–17)
MAGNESIUM SERPL-MCNC: 1.9 MG/DL — SIGNIFICANT CHANGE UP (ref 1.6–2.6)
MCHC RBC-ENTMCNC: 29 PG — SIGNIFICANT CHANGE UP (ref 27–34)
MCHC RBC-ENTMCNC: 32.3 GM/DL — SIGNIFICANT CHANGE UP (ref 32–36)
MCV RBC AUTO: 90 FL — SIGNIFICANT CHANGE UP (ref 80–100)
NRBC # BLD: 0 /100 WBCS — SIGNIFICANT CHANGE UP (ref 0–0)
PLATELET # BLD AUTO: 306 K/UL — SIGNIFICANT CHANGE UP (ref 150–400)
POTASSIUM SERPL-MCNC: 5 MMOL/L — SIGNIFICANT CHANGE UP (ref 3.5–5.3)
POTASSIUM SERPL-MCNC: 5.3 MMOL/L — SIGNIFICANT CHANGE UP (ref 3.5–5.3)
POTASSIUM SERPL-SCNC: 5 MMOL/L — SIGNIFICANT CHANGE UP (ref 3.5–5.3)
POTASSIUM SERPL-SCNC: 5.3 MMOL/L — SIGNIFICANT CHANGE UP (ref 3.5–5.3)
RBC # BLD: 3.1 M/UL — LOW (ref 4.2–5.8)
RBC # FLD: 15.9 % — HIGH (ref 10.3–14.5)
SODIUM SERPL-SCNC: 136 MMOL/L — SIGNIFICANT CHANGE UP (ref 135–145)
SODIUM SERPL-SCNC: 138 MMOL/L — SIGNIFICANT CHANGE UP (ref 135–145)
WBC # BLD: 7.84 K/UL — SIGNIFICANT CHANGE UP (ref 3.8–10.5)
WBC # FLD AUTO: 7.84 K/UL — SIGNIFICANT CHANGE UP (ref 3.8–10.5)

## 2022-06-06 RX ORDER — FOLIC ACID 0.8 MG
1 TABLET ORAL
Qty: 30 | Refills: 0
Start: 2022-06-06 | End: 2022-07-05

## 2022-06-06 RX ORDER — SERTRALINE 25 MG/1
1 TABLET, FILM COATED ORAL
Qty: 30 | Refills: 0
Start: 2022-06-06 | End: 2022-07-05

## 2022-06-06 RX ORDER — ASPIRIN/CALCIUM CARB/MAGNESIUM 324 MG
1 TABLET ORAL
Qty: 30 | Refills: 0
Start: 2022-06-06 | End: 2022-07-05

## 2022-06-06 RX ORDER — THIAMINE MONONITRATE (VIT B1) 100 MG
1 TABLET ORAL
Qty: 30 | Refills: 0
Start: 2022-06-06 | End: 2022-07-05

## 2022-06-06 RX ORDER — MAGNESIUM OXIDE 400 MG ORAL TABLET 241.3 MG
400 TABLET ORAL ONCE
Refills: 0 | Status: COMPLETED | OUTPATIENT
Start: 2022-06-06 | End: 2022-06-06

## 2022-06-06 RX ORDER — AMLODIPINE BESYLATE 2.5 MG/1
1 TABLET ORAL
Qty: 30 | Refills: 0
Start: 2022-06-06 | End: 2022-07-05

## 2022-06-06 RX ORDER — SODIUM CHLORIDE 9 MG/ML
250 INJECTION INTRAMUSCULAR; INTRAVENOUS; SUBCUTANEOUS ONCE
Refills: 0 | Status: COMPLETED | OUTPATIENT
Start: 2022-06-06 | End: 2022-06-06

## 2022-06-06 RX ORDER — SODIUM ZIRCONIUM CYCLOSILICATE 10 G/10G
10 POWDER, FOR SUSPENSION ORAL ONCE
Refills: 0 | Status: DISCONTINUED | OUTPATIENT
Start: 2022-06-06 | End: 2022-06-06

## 2022-06-06 RX ORDER — ACETAMINOPHEN 500 MG
2 TABLET ORAL
Qty: 240 | Refills: 0
Start: 2022-06-06 | End: 2022-07-05

## 2022-06-06 RX ORDER — PANTOPRAZOLE SODIUM 20 MG/1
1 TABLET, DELAYED RELEASE ORAL
Qty: 30 | Refills: 0
Start: 2022-06-06 | End: 2022-07-05

## 2022-06-06 RX ORDER — LABETALOL HCL 100 MG
2 TABLET ORAL
Qty: 240 | Refills: 0
Start: 2022-06-06 | End: 2022-07-05

## 2022-06-06 RX ADMIN — HEPARIN SODIUM 5000 UNIT(S): 5000 INJECTION INTRAVENOUS; SUBCUTANEOUS at 06:04

## 2022-06-06 RX ADMIN — AMLODIPINE BESYLATE 10 MILLIGRAM(S): 2.5 TABLET ORAL at 06:04

## 2022-06-06 RX ADMIN — Medication 1 TABLET(S): at 12:30

## 2022-06-06 RX ADMIN — PANTOPRAZOLE SODIUM 40 MILLIGRAM(S): 20 TABLET, DELAYED RELEASE ORAL at 06:04

## 2022-06-06 RX ADMIN — Medication 650 MILLIGRAM(S): at 00:56

## 2022-06-06 RX ADMIN — Medication 650 MILLIGRAM(S): at 02:00

## 2022-06-06 RX ADMIN — SODIUM CHLORIDE 125 MILLILITER(S): 9 INJECTION INTRAMUSCULAR; INTRAVENOUS; SUBCUTANEOUS at 12:37

## 2022-06-06 RX ADMIN — MAGNESIUM OXIDE 400 MG ORAL TABLET 400 MILLIGRAM(S): 241.3 TABLET ORAL at 12:30

## 2022-06-06 RX ADMIN — Medication 40 MILLIGRAM(S): at 06:05

## 2022-06-06 RX ADMIN — Medication 600 MILLIGRAM(S): at 12:29

## 2022-06-06 RX ADMIN — SERTRALINE 25 MILLIGRAM(S): 25 TABLET, FILM COATED ORAL at 12:30

## 2022-06-06 RX ADMIN — Medication 1 MILLIGRAM(S): at 12:37

## 2022-06-06 RX ADMIN — Medication 600 MILLIGRAM(S): at 06:04

## 2022-06-06 RX ADMIN — SODIUM CHLORIDE 3 MILLILITER(S): 9 INJECTION INTRAMUSCULAR; INTRAVENOUS; SUBCUTANEOUS at 06:25

## 2022-06-06 RX ADMIN — Medication 81 MILLIGRAM(S): at 12:30

## 2022-06-06 RX ADMIN — Medication 100 MILLIGRAM(S): at 12:31

## 2022-06-06 NOTE — PROGRESS NOTE ADULT - REASON FOR ADMISSION
Intramural hematoma

## 2022-06-06 NOTE — PROGRESS NOTE ADULT - PROVIDER SPECIALTY LIST ADULT
CT Surgery
CT Surgery
Critical Care
Neurology
CT Surgery
Critical Care
CT Surgery

## 2022-06-06 NOTE — DISCHARGE NOTE NURSING/CASE MANAGEMENT/SOCIAL WORK - NSDCPEFALRISK_GEN_ALL_CORE
For information on Fall & Injury Prevention, visit: [email or web address] OR  [email or web address] OR  https://www.cdc.gov/steadi/patient.html

## 2022-06-06 NOTE — DISCHARGE NOTE PROVIDER - NSDCFUADDINST_GEN_ALL_CORE_FT
-Walk daily as tolerated and use your incentive spirometer 10 times every hour while you are awake.     -Please weigh yourself daily. If you notice over a 3 pound weight gain in 3 days, this is a sign you are likely retaining too much fluid. It is imperative you call our right away with unexplained rapid weight gain.      -Please continue to wear the compression stockings given to you in the hospital at home. This is a way to prevent fluid from building up in your legs.     -No driving or strenuous activity/exercise until cleared by your surgeon.    -Gently clean your incisions with unscented/antibacterial soap and water, pat dry.  You may leave them open to air.    -Call your doctor if you have shortness of breath, chest pain not relieved by pain medication, dizziness, fever >100.5, or increased redness or drainage from incisions.  Furosemide 20mg PO to start tomorrow (6/7/22) once a day for 7 days     -Walk daily as tolerated and use your incentive spirometer 10 times every hour while you are awake.     -Please weigh yourself daily. If you notice over a 3 pound weight gain in 3 days, this is a sign you are likely retaining too much fluid. It is imperative you call our right away with unexplained rapid weight gain.      -Please continue to wear the compression stockings given to you in the hospital at home. This is a way to prevent fluid from building up in your legs.     -No driving or strenuous activity/exercise until cleared by your surgeon.    -Gently clean your incisions with unscented/antibacterial soap and water, pat dry.  You may leave them open to air.    -Call your doctor if you have shortness of breath, chest pain not relieved by pain medication, dizziness, fever >100.5, or increased redness or drainage from incisions.

## 2022-06-06 NOTE — PROGRESS NOTE ADULT - SUBJECTIVE AND OBJECTIVE BOX
Patient discussed on morning rounds with Dr. Ford    Operation / Date: 5/24/22 Ascending/hemiarch reconstruction and replacement EF 55-60%    Surgeon: Dr. Ford    Referring Physician:    SUBJECTIVE ASSESSMENT:  61y Male     Hospital Course:  Patient is a 62 y/o male, current smoker (30 pack-year history), with PMH of HTN & HLD who presented to an outside hospital on 5/16/22 with chest pain & uncontrolled HTN, ruled in for NSTEMI, & underwent a cardiac catheterization that revealed non-obstructive CAD, EF without valvulopathy and normal EF. On day of discharge, 5/21/22, patient was febrile & underwent an infectious disease workup. 5/22/22 CT of the chest revealed thoracic aorta changes and CTA chest showed chronic thrombosed dissection versus intramural hematoma along the thoracic aorta, small pericardial effusion and left pleural effusion & patient was transferred to [facility]. CTa C/A/P on 5/23/22 revealed a [place] type A intramural hemorrhage/dissecting aneurysm of thoracic aorta extending to the suprarenal abdominal aorta. CTH without acute findings. Neuro was consulted for a baseline neuro exam due to weakness and being tremulous and patient was cleared for surgery. CTH on 5/23 showed no acute findings. On 5/24 the patient underwent ascending/hemiarch replacement with Dr. Ford. In the OR, he received 2L IVF, 250cc albumin, 2 plt, 4FFP, 1000 FEIBA and was transferred to the CTICU on primacor, epi & vasopressin. On POD 1, he was extubated & was evaluated by speech & swallow, he passed but PO was deferred 2/2 increased WOB. On POD 2, he was stable & lopressor was started & primacor was d/c.. On POD 3, he had increased WOB, was diuresed, NGT placed & feeds started. On POD 4, all drains, except for claudine, were removed, he ambulated & his labetolol was up-titrated. On POD 5, he experienced delirium at night and required enhanced supervision. On POD 6, he passed speech and swallow. POD 7, he remained stable and was transferred to [facility].  POD 8 Blakes and epicardial wires removed.  POD 9 awaiting insurance authorization. POD10 chest US with trace L pleural effusion, no right pleural effusion. Today, 6/6/22, as per discussion with Dr. Ford, patient is stable for discharge to [facility].     Vital Signs Last 24 Hrs  T(C): 37 (06 Jun 2022 09:37), Max: 37.3 (06 Jun 2022 01:30)  T(F): 98.6 (06 Jun 2022 09:37), Max: 99.2 (06 Jun 2022 01:30)  HR: 71 (06 Jun 2022 11:10) (70 - 78)  BP: 135/86 (06 Jun 2022 11:10) (117/67 - 135/86)  BP(mean): 104 (06 Jun 2022 11:10) (85 - 104)  RR: 15 (06 Jun 2022 08:42) (15 - 18)  SpO2: 100% (06 Jun 2022 11:10) (96% - 100%)  I&O's Detail    05 Jun 2022 07:01  -  06 Jun 2022 07:00  --------------------------------------------------------  IN:    Oral Fluid: 750 mL  Total IN: 750 mL    OUT:    Stool (mL): 1 mL    Voided (mL): 1100 mL  Total OUT: 1101 mL    Total NET: -351 mL      06 Jun 2022 07:01  -  06 Jun 2022 14:10  --------------------------------------------------------  IN:  Total IN: 0 mL    OUT:    Voided (mL): 125 mL  Total OUT: 125 mL    Total NET: -125 mL          EPICARDIAL WIRES REMOVED: Yes.  TIE DOWNS REMOVED: Yes.    PHYSICAL EXAM:    Appearance: No acute distress.  Neurologic: AAOx3, no AMS or focal deficits.  Responds appropriately to verbal and physical stimuli; exhibits purposeful movement in all extremities.  Cardiovascular: RRR, S1 S2. No m/r/g.  Respiratory: No acute respiratory distress. CTA b/l, no w/r/r.   Gastrointestinal:  Soft, non-tender, non-distended, + BS.	  Skin: No rashes. No ecchymoses. No cyanosis.  Extremities: Exhibits normal range of motion, no clubbing, cyanosis or edema.  Vascular: Peripheral pulses palpable 2+ bilaterally.   Incision Sites: midline sternotomy incision c/d/i no sternal click.     LABS:                        9.0    7.84  )-----------( 306      ( 06 Jun 2022 05:30 )             27.9       COUMADIN:  No.        06-06    136  |  105  |  30<H>  ----------------------------<  98  5.3   |  19<L>  |  2.66<H>    Ca    9.2      06 Jun 2022 13:39  Mg     1.9     06-06            MEDICATIONS  (STANDING):  acetaminophen   IVPB .. 1000 milliGRAM(s) IV Intermittent once  amLODIPine   Tablet 10 milliGRAM(s) Oral daily  aspirin  chewable 81 milliGRAM(s) Oral daily  dextrose 50% Injectable 25 Gram(s) IV Push once  dextrose 50% Injectable 12.5 Gram(s) IV Push once  dextrose 50% Injectable 25 Gram(s) IV Push once  folic acid 1 milliGRAM(s) Oral daily  glucagon  Injectable 1 milliGRAM(s) IntraMuscular once  heparin   Injectable 5000 Unit(s) SubCutaneous every 8 hours  insulin lispro (ADMELOG) corrective regimen sliding scale   SubCutaneous Before meals and at bedtime  labetalol 600 milliGRAM(s) Oral every 6 hours  multivitamin 1 Tablet(s) Oral daily  pantoprazole    Tablet 40 milliGRAM(s) Oral before breakfast  sertraline 25 milliGRAM(s) Oral daily  sodium chloride 0.9% lock flush 3 milliLiter(s) IV Push every 8 hours  sodium zirconium cyclosilicate 10 Gram(s) Oral once  thiamine 100 milliGRAM(s) Oral daily      Discharge CXR:    < from: Xray Chest 2 Views PA/Lat (06.05.22 @ 09:20) >  IMPRESSION:    Hyperinflation. No significant pleural effusion. Postop changes. No lung   consolidation. No pneumothorax. No acute bone abnormality.    --- End of Report ---    < end of copied text >      Discharge ECHO:  < from: TTE Echo Complete w/o Contrast w/ Doppler (06.03.22 @ 08:57) >  CONCLUSIONS:     1. Limited study obtained for evaluation of pericardial effusion.   2. Normal left ventricular systolic function.   3. Normal right ventricular size and systolic function.   4. Pulmonary artery systolic pressure is 30 mmHg.   5. Trivial pericardial effusion.   6. Left pleural effusion.   7. Compared to the previous TTE performed on 5/23/2022, the pericardial   effusion has decreased in size.    < end of copied text >

## 2022-06-06 NOTE — PROGRESS NOTE ADULT - ASSESSMENT
Med Reconciliation:  Medication Reconciliation Status	Admission Reconciliation is Completed  Discharge Reconciliation is Completed  Discharge Medications	acetaminophen 325 mg oral tablet: 2 tab(s) orally every 6 hours, As needed, Mild Pain (1 - 3)  amLODIPine 10 mg oral tablet: 1 tab(s) orally once a day  aspirin 81 mg oral tablet, chewable: 1 tab(s) orally once a day  folic acid 1 mg oral tablet: 1 tab(s) orally once a day  furosemide 20 mg oral tablet: 1 tab(s) orally once a day starting tomorrow (6/7/22) for 7 days   labetalol 300 mg oral tablet: 2 tab(s) orally every 6 hours  Multiple Vitamins oral tablet: 1 tab(s) orally once a day  pantoprazole 40 mg oral delayed release tablet: 1 tab(s) orally once a day (before a meal)  sertraline 25 mg oral tablet: 1 tab(s) orally once a day  thiamine 100 mg oral tablet: 1 tab(s) orally once a day  	  ,  ,     Care Plan/Procedures:  Discharge Diagnoses, Assessment and Plan of Treatment	PRINCIPAL DISCHARGE DIAGNOSIS  Diagnosis: Intramural hematoma  Assessment and Plan of Treatment:  Discharge Procedures, Findings and Treatment	PRINCIPAL PROCEDURE  Procedure: Replacement, ascending aorta and hemiarch  Findings and Treatment: 28mm anteflow graft   Total bypass time: 185 mins  Aortic clamp time:  91 mins   CA: 31 mins (1st RCP: 8mins, ACP: 18mins  2nd RCP: 5mins  )      SECONDARY PROCEDURE  Procedure: Reconstruction of aortic root  Findings and Treatment:  Goal(s)	To get better and follow your care plan as instructed.     Follow Up:  Care Providers for Follow up (PCP/Outpatient Provider)	Jay Ford)  Surgery; Thoracic and Cardiac Surgery  [address] [place] [address]  Phone: [phone]  Fax: [phone]  Established Patient  Scheduled Appointment: 06/22/2022 12:45 PM    Mike Whelan  [address] [facility]  Phone: [phone]  Fax: (   )    -  Established Patient  Scheduled Appointment: 06/23/2022 12:00 AM  Patient's Scheduled Appointments	[facility]  [facility] [address] [facility] [address]  Scheduled Appointment: 06/22/2022  Discharge Diet	DASH Diet  Activity	Do not drive or operate machinery, No heavy lifting/straining, Showering allowed, Stairs allowed, Walking - Indoors allowed, Walking - Outdoors allowed  Additional Instructions	Furosemide 20mg PO to start tomorrow (6/7/22) once a day for 7 days     -Walk daily as tolerated and use your incentive spirometer 10 times every hour while you are awake.     -Please weigh yourself daily. If you notice over a 3 pound weight gain in 3 days, this is a sign you are likely retaining too much fluid. It is imperative you call our right away with unexplained rapid weight gain.      -Please continue to wear the compression stockings given to you in the hospital at home. This is a way to prevent fluid from building up in your legs.     -No driving or strenuous activity/exercise until cleared by your surgeon.    -Gently clean your incisions with unscented/antibacterial soap and water, pat dry.  You may leave them open to air.    -Call your doctor if you have shortness of breath, chest pain not relieved by pain medication, dizziness, fever >100.5, or increased redness or drainage from incisions.

## 2022-06-06 NOTE — DISCHARGE NOTE NURSING/CASE MANAGEMENT/SOCIAL WORK - PATIENT PORTAL LINK FT
You can access the FollowMyHealth Patient Portal offered by [facility] by registering at the following website: [email or web address]. By joining [employer]’s FollowMyHealth portal, you will also be able to view your health information using other applications (apps) compatible with our system.

## 2022-06-06 NOTE — CHART NOTE - NSCHARTNOTESELECT_GEN_ALL_CORE
CT removal/Event Note
Chest US/Event Note
Epicardial wire removal/Event Note
Event Note
Nutrition Services

## 2022-06-06 NOTE — DISCHARGE NOTE PROVIDER - NSDCMRMEDTOKEN_GEN_ALL_CORE_FT
acetaminophen 325 mg oral tablet: 2 tab(s) orally every 6 hours, As needed, Mild Pain (1 - 3)  amLODIPine 10 mg oral tablet: 1 tab(s) orally once a day  aspirin 81 mg oral tablet, chewable: 1 tab(s) orally once a day  folic acid 1 mg oral tablet: 1 tab(s) orally once a day  labetalol 300 mg oral tablet: 2 tab(s) orally every 6 hours  Multiple Vitamins oral tablet: 1 tab(s) orally once a day  pantoprazole 40 mg oral delayed release tablet: 1 tab(s) orally once a day (before a meal)  sertraline 25 mg oral tablet: 1 tab(s) orally once a day  thiamine 100 mg oral tablet: 1 tab(s) orally once a day   acetaminophen 325 mg oral tablet: 2 tab(s) orally every 6 hours, As needed, Mild Pain (1 - 3)  amLODIPine 10 mg oral tablet: 1 tab(s) orally once a day  aspirin 81 mg oral tablet, chewable: 1 tab(s) orally once a day  folic acid 1 mg oral tablet: 1 tab(s) orally once a day  furosemide 20 mg oral tablet: 1 tab(s) orally once a day starting tomorrow (6/7/22) for 7 days   labetalol 300 mg oral tablet: 2 tab(s) orally every 6 hours  Multiple Vitamins oral tablet: 1 tab(s) orally once a day  pantoprazole 40 mg oral delayed release tablet: 1 tab(s) orally once a day (before a meal)  sertraline 25 mg oral tablet: 1 tab(s) orally once a day  thiamine 100 mg oral tablet: 1 tab(s) orally once a day

## 2022-06-06 NOTE — DISCHARGE NOTE PROVIDER - PROVIDER TOKENS
PROVIDER:[TOKEN:[[id number]],SCHEDULEDAPPT:[06/22/2022],SCHEDULEDAPPTTIME:[12:45 PM],ESTABLISHEDPATIENT:[T]],FREE:[LAST:[Octaviola],FIRST:[Mike],PHONE:[phone]],FAX:[(   )    -],ADDRESS:[[address] [place] [address]],SCHEDULEDAPPT:[06/23/2022],SCHEDULEDAPPTTIME:[12:00 AM],ESTABLISHEDPATIENT:[T]]

## 2022-06-06 NOTE — DISCHARGE NOTE PROVIDER - NSDCCPCAREPLAN_GEN_ALL_CORE_FT
PRINCIPAL DISCHARGE DIAGNOSIS  Diagnosis: Intramural hematoma  Assessment and Plan of Treatment:

## 2022-06-06 NOTE — DISCHARGE NOTE PROVIDER - CARE PROVIDER_API CALL
Jay Ford)  Surgery; Thoracic and Cardiac Surgery  [address] [place] [address]  Phone: [phone]  Fax: [phone]  Established Patient  Scheduled Appointment: 06/22/2022 12:45 PM    Mike Whelan  [address] [facility]  Phone: [phone]  Fax: (   )    -  Established Patient  Scheduled Appointment: 06/23/2022 12:00 AM

## 2022-06-06 NOTE — DISCHARGE NOTE PROVIDER - HOSPITAL COURSE
Patient is a 60 y/o male, current smoker (30 pack-year history), with PMH of HTN & HLD who presented to an outside hospital on 5/16/22 with chest pain & uncontrolled HTN, ruled in for NSTEMI, & underwent a cardiac catheterization that revealed non-obstructive CAD, EF without valvulopathy and normal EF. On day of discharge, 5/21/22, patient was febrile & underwent an infectious disease workup. 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 CTH without acute findings. Neuro was consulted for a baseline neuro exam due to weakness and being tremulous and patient was cleared for surgery. CTH on 5/23 showed no acute findings. On 5/24 the patient underwent ascending/hemiarch replacement with Dr. Ford. In the OR, he received 2L IVF, 250cc albumin, 2 plt, 4FFP, 1000 FEIBA and was transferred to the CTICU on primacor, epi & vasopressin. On POD 1, he was extubated & was evaluated by speech & swallow, he passed but PO was deferred 2/2 increased WOB. On POD 2, he was stable & lopressor was started & primacor was d/c.. On POD 3, he had increased WOB, was diuresed, NGT placed & feeds started. On POD 4, all drains, except for claudine, were removed, he ambulated & his labetolol was up-titrated. On POD 5, he experienced delirium at night and required enhanced supervision. On POD 6, he passed speech and swallow. POD 7, he remained stable and was transferred to [facility].  POD 8 Blakes and epicardial wires removed.  POD 9 awaiting insurance authorization. POD10 chest US with trace L pleural effusion, no right pleural effusion. Today, as per discussion with Dr. Hickey, patient is stable for discharge to [facility].     35 minutes was spent with the patient reviewing the discharge material including medications, follow up appointments, recovery, concerning symptoms, and how to contact their health care providers if they have questions Patient is a 60 y/o male, current smoker (30 pack-year history), with PMH of HTN & HLD who presented to an outside hospital on 5/16/22 with chest pain & uncontrolled HTN, ruled in for NSTEMI, & underwent a cardiac catheterization that revealed non-obstructive CAD, EF without valvulopathy and normal EF. On day of discharge, 5/21/22, patient was febrile & underwent an infectious disease workup. 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 CTH without acute findings. Neuro was consulted for a baseline neuro exam due to weakness and being tremulous and patient was cleared for surgery. CTH on 5/23 showed no acute findings. On 5/24 the patient underwent ascending/hemiarch replacement with Dr. Ford. In the OR, he received 2L IVF, 250cc albumin, 2 plt, 4FFP, 1000 FEIBA and was transferred to the CTICU on primacor, epi & vasopressin. On POD 1, he was extubated & was evaluated by speech & swallow, he passed but PO was deferred 2/2 increased WOB. On POD 2, he was stable & lopressor was started & primacor was d/c.. On POD 3, he had increased WOB, was diuresed, NGT placed & feeds started. On POD 4, all drains, except for claudine, were removed, he ambulated & his labetolol was up-titrated. On POD 5, he experienced delirium at night and required enhanced supervision. On POD 6, he passed speech and swallow. POD 7, he remained stable and was transferred to [facility].  POD 8 Blakes and epicardial wires removed.  POD 9 awaiting insurance authorization. POD10 chest US with trace L pleural effusion, no right pleural effusion. Today, 6/6/22, as per discussion with Dr. Ford, patient is stable for discharge to [facility].     35 minutes was spent with the patient reviewing the discharge material including medications, follow up appointments, recovery, concerning symptoms, and how to contact their health care providers if they have questions

## 2022-06-06 NOTE — DISCHARGE NOTE PROVIDER - NSDCCPTREATMENT_GEN_ALL_CORE_FT
PRINCIPAL PROCEDURE  Procedure: Replacement, ascending aorta and hemiarch  Findings and Treatment: 28mm anteflow graft   Total bypass time: 185 mins  Aortic clamp time:  91 mins   CA: 31 mins (1st RCP: 8mins, ACP: 18mins  2nd RCP: 5mins  )      SECONDARY PROCEDURE  Procedure: Reconstruction of aortic root  Findings and Treatment:

## 2022-06-06 NOTE — CHART NOTE - NSCHARTNOTEFT_GEN_A_CORE
Labs this AM reviewed:    Sodium, Serum: 138 mmol/L   Potassium, Serum: 5.0 mmol/L   Chloride, Serum: 107 mmol/L   Carbon Dioxide, Serum: 21 mmol/L   Anion Gap, Serum: 10 mmol/L   Blood Urea Nitrogen, Serum: 32: Checked result, consistent with patient history mg/dL   Creatinine, Serum: 2.95 mg/dL   Glucose, Serum: 104 mg/dL   Calcium, Total Serum: 9.2 mg/dL       Given 250cc NS. Labs repeated afterwards:      Sodium, Serum: 136 mmol/L   Potassium, Serum: 5.3 mmol/L   Chloride, Serum: 105 mmol/L   Carbon Dioxide, Serum: 19 mmol/L   Anion Gap, Serum: 12 mmol/L   Blood Urea Nitrogen, Serum: 30 mg/dL   Creatinine, Serum: 2.66 mg/dL   Glucose, Serum: 98 mg/dL   Calcium, Total Serum: 9.2 mg/dL       Given Lokelma 10 x 1 prior to discharge. Patient was discharged on 7 days of Lasix 20 PO daily with NO potassium supplementation. Labs this AM reviewed. Found to have elevated Creatinine 2.95 (peak this admission) and L 5.0. He had been receiving lasix 40 PO daily. 250cc NS given over 1 hour:    Sodium, Serum: 138 mmol/L   Potassium, Serum: 5.0 mmol/L   Chloride, Serum: 107 mmol/L   Carbon Dioxide, Serum: 21 mmol/L   Anion Gap, Serum: 10 mmol/L   Blood Urea Nitrogen, Serum: 32: Checked result, consistent with patient history mg/dL   Creatinine, Serum: 2.95 mg/dL   Glucose, Serum: 104 mg/dL   Calcium, Total Serum: 9.2 mg/dL       Labs repeated after intervention:    Sodium, Serum: 136 mmol/L   Potassium, Serum: 5.3 mmol/L   Chloride, Serum: 105 mmol/L   Carbon Dioxide, Serum: 19 mmol/L   Anion Gap, Serum: 12 mmol/L   Blood Urea Nitrogen, Serum: 30 mg/dL   Creatinine, Serum: 2.66 mg/dL   Glucose, Serum: 98 mg/dL   Calcium, Total Serum: 9.2 mg/dL       Creatinine significantly improved and at patient's baseline for this hospital stay. Given Lokelma 10 x 1 prior to discharge. Patient was discharged on 7 days of Lasix 20 PO daily for effusions with NO potassium supplementation. Labs this AM reviewed. Found to have elevated Creatinine 2.95 (peak this admission) and K 5.0. He had been receiving lasix 40 PO daily. 250cc NS given over 1 hour:    Sodium, Serum: 138 mmol/L   Potassium, Serum: 5.0 mmol/L   Chloride, Serum: 107 mmol/L   Carbon Dioxide, Serum: 21 mmol/L   Anion Gap, Serum: 10 mmol/L   Blood Urea Nitrogen, Serum: 32: Checked result, consistent with patient history mg/dL   Creatinine, Serum: 2.95 mg/dL   Glucose, Serum: 104 mg/dL   Calcium, Total Serum: 9.2 mg/dL       Labs repeated after intervention:    Sodium, Serum: 136 mmol/L   Potassium, Serum: 5.3 mmol/L   Chloride, Serum: 105 mmol/L   Carbon Dioxide, Serum: 19 mmol/L   Anion Gap, Serum: 12 mmol/L   Blood Urea Nitrogen, Serum: 30 mg/dL   Creatinine, Serum: 2.66 mg/dL   Glucose, Serum: 98 mg/dL   Calcium, Total Serum: 9.2 mg/dL       Creatinine significantly improved and at patient's baseline for this hospital stay. Given Lokelma 10 x 1 prior to discharge. Patient was discharged on 7 days of Lasix 20 PO daily for effusions with NO potassium supplementation.

## 2022-06-07 ENCOUNTER — TRANSCRIPTION ENCOUNTER (OUTPATIENT)
Age: 62
End: 2022-06-07

## 2022-06-07 RX ORDER — FUROSEMIDE 40 MG
1 TABLET ORAL
Qty: 7 | Refills: 0
Start: 2022-06-07 | End: 2022-06-13

## 2022-06-08 ENCOUNTER — TRANSCRIPTION ENCOUNTER (OUTPATIENT)
Age: 62
End: 2022-06-08

## 2022-06-09 ENCOUNTER — TRANSCRIPTION ENCOUNTER (OUTPATIENT)
Age: 62
End: 2022-06-09

## 2022-06-10 ENCOUNTER — TRANSCRIPTION ENCOUNTER (OUTPATIENT)
Age: 62
End: 2022-06-10

## 2022-06-13 ENCOUNTER — TRANSCRIPTION ENCOUNTER (OUTPATIENT)
Age: 62
End: 2022-06-13

## 2022-06-14 DIAGNOSIS — R57.8 OTHER SHOCK: ICD-10-CM

## 2022-06-14 DIAGNOSIS — R57.1 HYPOVOLEMIC SHOCK: ICD-10-CM

## 2022-06-14 DIAGNOSIS — D62 ACUTE POSTHEMORRHAGIC ANEMIA: ICD-10-CM

## 2022-06-14 DIAGNOSIS — I25.10 ATHEROSCLEROTIC HEART DISEASE OF NATIVE CORONARY ARTERY WITHOUT ANGINA PECTORIS: ICD-10-CM

## 2022-06-14 DIAGNOSIS — R57.0 CARDIOGENIC SHOCK: ICD-10-CM

## 2022-06-14 DIAGNOSIS — I50.43 ACUTE ON CHRONIC COMBINED SYSTOLIC (CONGESTIVE) AND DIASTOLIC (CONGESTIVE) HEART FAILURE: ICD-10-CM

## 2022-06-14 DIAGNOSIS — R45.1 RESTLESSNESS AND AGITATION: ICD-10-CM

## 2022-06-14 DIAGNOSIS — E78.5 HYPERLIPIDEMIA, UNSPECIFIED: ICD-10-CM

## 2022-06-14 DIAGNOSIS — I31.2 HEMOPERICARDIUM, NOT ELSEWHERE CLASSIFIED: ICD-10-CM

## 2022-06-14 DIAGNOSIS — N17.0 ACUTE KIDNEY FAILURE WITH TUBULAR NECROSIS: ICD-10-CM

## 2022-06-14 DIAGNOSIS — E87.0 HYPEROSMOLALITY AND HYPERNATREMIA: ICD-10-CM

## 2022-06-14 DIAGNOSIS — Z91.14 PATIENT'S OTHER NONCOMPLIANCE WITH MEDICATION REGIMEN: ICD-10-CM

## 2022-06-14 DIAGNOSIS — E87.2 ACIDOSIS: ICD-10-CM

## 2022-06-14 DIAGNOSIS — I11.0 HYPERTENSIVE HEART DISEASE WITH HEART FAILURE: ICD-10-CM

## 2022-06-14 DIAGNOSIS — G92.8 OTHER TOXIC ENCEPHALOPATHY: ICD-10-CM

## 2022-06-14 DIAGNOSIS — R41.0 DISORIENTATION, UNSPECIFIED: ICD-10-CM

## 2022-06-14 DIAGNOSIS — J96.01 ACUTE RESPIRATORY FAILURE WITH HYPOXIA: ICD-10-CM

## 2022-06-14 DIAGNOSIS — J98.11 ATELECTASIS: ICD-10-CM

## 2022-06-14 DIAGNOSIS — I71.01 DISSECTION OF THORACIC AORTA: ICD-10-CM

## 2022-06-14 DIAGNOSIS — I21.4 NON-ST ELEVATION (NSTEMI) MYOCARDIAL INFARCTION: ICD-10-CM

## 2022-06-14 DIAGNOSIS — I95.9 HYPOTENSION, UNSPECIFIED: ICD-10-CM

## 2022-06-14 DIAGNOSIS — I31.3 PERICARDIAL EFFUSION (NONINFLAMMATORY): ICD-10-CM

## 2022-06-14 DIAGNOSIS — F29 UNSPECIFIED PSYCHOSIS NOT DUE TO A SUBSTANCE OR KNOWN PHYSIOLOGICAL CONDITION: ICD-10-CM

## 2022-06-14 DIAGNOSIS — F17.210 NICOTINE DEPENDENCE, CIGARETTES, UNCOMPLICATED: ICD-10-CM

## 2022-06-16 ENCOUNTER — TRANSCRIPTION ENCOUNTER (OUTPATIENT)
Age: 62
End: 2022-06-16

## 2022-06-17 ENCOUNTER — TRANSCRIPTION ENCOUNTER (OUTPATIENT)
Age: 62
End: 2022-06-17

## 2022-06-17 DIAGNOSIS — F17.200 NICOTINE DEPENDENCE, UNSPECIFIED, UNCOMPLICATED: ICD-10-CM

## 2022-06-17 DIAGNOSIS — I71.01 DISSECTION OF THORACIC AORTA: ICD-10-CM

## 2022-06-17 DIAGNOSIS — I71.2 DISSECTION OF THORACIC AORTA: ICD-10-CM

## 2022-06-17 DIAGNOSIS — Z86.39 PERSONAL HISTORY OF OTHER ENDOCRINE, NUTRITIONAL AND METABOLIC DISEASE: ICD-10-CM

## 2022-06-17 DIAGNOSIS — I25.2 OLD MYOCARDIAL INFARCTION: ICD-10-CM

## 2022-06-17 DIAGNOSIS — Z86.79 PERSONAL HISTORY OF OTHER DISEASES OF THE CIRCULATORY SYSTEM: ICD-10-CM

## 2022-06-20 ENCOUNTER — TRANSCRIPTION ENCOUNTER (OUTPATIENT)
Age: 62
End: 2022-06-20

## 2022-06-21 LAB — SURGICAL PATHOLOGY STUDY: SIGNIFICANT CHANGE UP

## 2022-06-22 ENCOUNTER — APPOINTMENT (OUTPATIENT)
Dept: CARDIOTHORACIC SURGERY | Facility: CLINIC | Age: 62
End: 2022-06-22
Payer: MEDICAID

## 2022-06-22 VITALS
OXYGEN SATURATION: 98 % | TEMPERATURE: 98.3 F | BODY MASS INDEX: 24.22 KG/M2 | HEIGHT: 71 IN | SYSTOLIC BLOOD PRESSURE: 153 MMHG | HEART RATE: 67 BPM | DIASTOLIC BLOOD PRESSURE: 91 MMHG | WEIGHT: 173 LBS | RESPIRATION RATE: 17 BRPM

## 2022-06-22 DIAGNOSIS — Z98.890 OTHER SPECIFIED POSTPROCEDURAL STATES: ICD-10-CM

## 2022-06-22 DIAGNOSIS — Z86.79 OTHER SPECIFIED POSTPROCEDURAL STATES: ICD-10-CM

## 2022-06-22 PROCEDURE — 99024 POSTOP FOLLOW-UP VISIT: CPT

## 2022-06-22 RX ORDER — PANTOPRAZOLE SODIUM 40 MG/1
40 GRANULE, DELAYED RELEASE ORAL
Refills: 0 | Status: ACTIVE | COMMUNITY
Start: 2022-06-22

## 2022-06-22 RX ORDER — SERTRALINE 25 MG/1
25 TABLET, FILM COATED ORAL
Qty: 30 | Refills: 0 | Status: ACTIVE | COMMUNITY
Start: 2022-06-06

## 2022-06-24 ENCOUNTER — TRANSCRIPTION ENCOUNTER (OUTPATIENT)
Age: 62
End: 2022-06-24

## 2022-06-27 ENCOUNTER — TRANSCRIPTION ENCOUNTER (OUTPATIENT)
Age: 62
End: 2022-06-27

## 2022-06-28 NOTE — REASON FOR VISIT
[de-identified] : type A aortic dissection ----- ascending aorta and hemiarch replacement, aortic  root reconstruction, resuspension of the aortic valve commissures  [de-identified] : 5/24/22 [de-identified] : In the OR, he received 2L IVF, 250cc albumin, 2 plt, 4FFP, 1000 FEIBA and was transferred to the CTICU on primacor, epi & vasopressin. On POD 1, he was extubated & was evaluated by speech & swallow, he passed but PO was deferred 2/2 increased WOB. On POD 2, he was stable & lopressor was started & primacor was d/c.. On POD 3, he had increased WOB, was diuresed, NGT placed & feeds started. On POD 4, all drains,except for claudine, were removed, he ambulated & his labetalol was up-titrated. On POD 5, he experienced delirium at night and required enhanced supervision. On POD 6, he passed speech and swallow.OD10 chest US with trace L pleural effusion, no right pleural effusion. On 6/6 he was discharged to [facility] \par \par He presents today for a postop visit. Patient is still at [facility] but is doing well overall. He is ambulating, increasing his activities and working with PT ~1.5hr daily. Patient denies fever, chills, dizziness, syncope, shortness of breath, chest pain, palpitations or peripheral edema.

## 2022-06-28 NOTE — END OF VISIT
[FreeTextEntry3] : \par I, KRYSTINA MONREALU , am scribing for and in the presence of ALESHIA HAQUE the following sections: History of present illness, past Medical/family/surgical/family/social history, review of systems, vital signs, physical exam and disposition.\par \par I personally performed the services described in the documentation, reviewed the documentation recorded by the scribe in my presence and it accurately and completely records my words and actions.\par \par

## 2022-06-28 NOTE — ASSESSMENT
[FreeTextEntry1] : 61 year old male s/p type A aortic dissection -- ascending aorta and hemiarch replacement, aortic  root reconstruction, resuspension of the aortic valve commissures on 5/24/22 presents for a postop visit. \par \par I have reviewed the patient's medical records, diagnostic images during the time of this office consultation and have made the following recommendation. The surgical repair is intact and stable.\par \par Continue current medication regimen.\par Continue to increase activity and walk daily as tolerated. Continue to use incentive spirometer. \par No driving or strenuous activity for 4 weeks after surgery. Avoid lifting >10 to 15 lbs.\par Call MD if you experience fever, fatigue, dizziness, confusion, syncope, shortness of breath, chest pain not relieved with analgesics, increased redness/drainage from incision.\par Continue to use compression stockings. Keep legs elevated above heart when resting/sitting/sleeping\par Follow up with Dr. Mike Whelan \par Follow up in CTS clinic in 6 month with CTA chest, abdomen and pelvis. \par

## 2022-06-28 NOTE — CONSULT LETTER
[FreeTextEntry2] : Dr. Mike Whelan \par [id number], \[place] [address] [FreeTextEntry1] : We take a multidisciplinary team approach to patient care and consider you, the referring physician, an extension of our team. We will maintain an open line of communication with you throughout your patient's treatment course.  \par \par Mr. JUNE CLEMENTS  presents to the office today for a post operative visit. \par \par He is doing well status post  type A aortic dissection -- ascending aorta and hemiarch replacement, aortic  root reconstruction, resuspension of the aortic valve commissures on 5/24/22. \par \par I have recommended that the patient will follow up in the [facility] in 6 months with a repeat CTA to monitor his surgical repair. My office will assist the patient with his upcoming appointment and I will update you on his progress at that time. I have recommended the patient for cardiac rehabilitation to be arranged via your office. \par \par I have discussed with the patient that we will continue to monitor his  aortic pathology closely at the [facility], that encompasses the entire health care system and is one of the largest in the nation at this point.\par \par Enclosure: Operative Note.  Discharge Summary.  Operative Diagram. \par \par I appreciate the opportunity to care for your patient at the [facility] based at [facility]. If there are any questions or concerns, please call me directly at [phone]. \par \par Sincerely, \par \par \par \par \par Jay Ford M.D.\par Professor of Cardiovascular and Thoracic Surgery\par Minimally Invasive Valve Surgeon\par Director of Aortic Surgery, [facility]\par Cell: [phone]\par Email: [email or web address] \par \par [facility]:\par [address], [place] [address]\par Office: [phone]\par Fax: [phone]\par \par [facility]:\par Department of Cardiovascular and Thoracic Surgery\par [address] [place], [place], [address]\par Office: [phone]\par Fax: [phone]\par \par Practice Manager: Ms. Beatris Lubin\par Email: [email or web address]\par Phone: [phone]\par \par

## 2022-06-30 NOTE — H&P ADULT - NSHPPHYSICALEXAM_GEN_ALL_CORE
Neuro: A+O x 3, non-focal, speech clear and intact  HEENT: PERRL, EOMI, oral mucosa pink and moist  Neck: supple, no JVD  CV: regular rate, regular rhythm, +S1S2, no murmurs or rub  Pulm/chest: lung sounds CTA and equal bilaterally, no accessory muscle use noted  Abd: soft, NT, ND, +BS  Ext: ALFARO x 4, no C/C/E  Skin: warm, well perfused, no rashes Neuro: A+O x 3, non-focal, speech clear and intact  HEENT: PERRL, EOMI, oral mucosa pink and moist  Neck: supple, no JVD  CV: regular rate, regular rhythm, +S1S2, no murmurs or rub  Pulm/chest: lung sounds CTA and equal bilaterally, no accessory muscle use noted  Abd: soft, NT, ND, +BS  Ext: ALFARO x 4, no C/C/E  Skin: warm, well perfused, no rashes    Vital Signs Last 24 Hrs  T(C): 37.1 (23 May 2022 00:54), Max: 37.1 (23 May 2022 00:54)  T(F): 98.7 (23 May 2022 00:54), Max: 98.7 (23 May 2022 00:54)  HR: 65 (23 May 2022 02:00) (61 - 66)  BP: 119/66 (23 May 2022 02:00) (113/68 - 126/80)  BP(mean): 87 (23 May 2022 02:00) (85 - 98)  RR: 16 (23 May 2022 02:00) (15 - 18)  SpO2: 94% (23 May 2022 02:00) (93% - 95%) pressure dressing

## 2022-07-05 ENCOUNTER — TRANSCRIPTION ENCOUNTER (OUTPATIENT)
Age: 62
End: 2022-07-05

## 2022-07-06 ENCOUNTER — TRANSCRIPTION ENCOUNTER (OUTPATIENT)
Age: 62
End: 2022-07-06

## 2022-08-11 PROCEDURE — 88305 TISSUE EXAM BY PATHOLOGIST: CPT

## 2022-08-11 PROCEDURE — P9037: CPT

## 2022-08-11 PROCEDURE — 97161 PT EVAL LOW COMPLEX 20 MIN: CPT

## 2022-08-11 PROCEDURE — 97116 GAIT TRAINING THERAPY: CPT

## 2022-08-11 PROCEDURE — 85610 PROTHROMBIN TIME: CPT

## 2022-08-11 PROCEDURE — 92612 ENDOSCOPY SWALLOW (FEES) VID: CPT

## 2022-08-11 PROCEDURE — 71275 CT ANGIOGRAPHY CHEST: CPT

## 2022-08-11 PROCEDURE — 82803 BLOOD GASES ANY COMBINATION: CPT

## 2022-08-11 PROCEDURE — 83735 ASSAY OF MAGNESIUM: CPT

## 2022-08-11 PROCEDURE — C1769: CPT

## 2022-08-11 PROCEDURE — 93880 EXTRACRANIAL BILAT STUDY: CPT

## 2022-08-11 PROCEDURE — C1894: CPT

## 2022-08-11 PROCEDURE — 85027 COMPLETE CBC AUTOMATED: CPT

## 2022-08-11 PROCEDURE — 84295 ASSAY OF SERUM SODIUM: CPT

## 2022-08-11 PROCEDURE — C1751: CPT

## 2022-08-11 PROCEDURE — 74174 CTA ABD&PLVS W/CONTRAST: CPT

## 2022-08-11 PROCEDURE — 80053 COMPREHEN METABOLIC PANEL: CPT

## 2022-08-11 PROCEDURE — 85730 THROMBOPLASTIN TIME PARTIAL: CPT

## 2022-08-11 PROCEDURE — 81001 URINALYSIS AUTO W/SCOPE: CPT

## 2022-08-11 PROCEDURE — 86803 HEPATITIS C AB TEST: CPT

## 2022-08-11 PROCEDURE — 93005 ELECTROCARDIOGRAM TRACING: CPT

## 2022-08-11 PROCEDURE — 80307 DRUG TEST PRSMV CHEM ANLYZR: CPT

## 2022-08-11 PROCEDURE — 97530 THERAPEUTIC ACTIVITIES: CPT

## 2022-08-11 PROCEDURE — P9059: CPT

## 2022-08-11 PROCEDURE — 86891 AUTOLOGOUS BLOOD OP SALVAGE: CPT

## 2022-08-11 PROCEDURE — 87635 SARS-COV-2 COVID-19 AMP PRB: CPT

## 2022-08-11 PROCEDURE — 92526 ORAL FUNCTION THERAPY: CPT

## 2022-08-11 PROCEDURE — P9012: CPT

## 2022-08-11 PROCEDURE — 71045 X-RAY EXAM CHEST 1 VIEW: CPT

## 2022-08-11 PROCEDURE — 94002 VENT MGMT INPAT INIT DAY: CPT

## 2022-08-11 PROCEDURE — 82550 ASSAY OF CK (CPK): CPT

## 2022-08-11 PROCEDURE — 97162 PT EVAL MOD COMPLEX 30 MIN: CPT

## 2022-08-11 PROCEDURE — P9100: CPT

## 2022-08-11 PROCEDURE — 36415 COLL VENOUS BLD VENIPUNCTURE: CPT

## 2022-08-11 PROCEDURE — 84100 ASSAY OF PHOSPHORUS: CPT

## 2022-08-11 PROCEDURE — 86900 BLOOD TYPING SEROLOGIC ABO: CPT

## 2022-08-11 PROCEDURE — 84443 ASSAY THYROID STIM HORMONE: CPT

## 2022-08-11 PROCEDURE — 82962 GLUCOSE BLOOD TEST: CPT

## 2022-08-11 PROCEDURE — 83036 HEMOGLOBIN GLYCOSYLATED A1C: CPT

## 2022-08-11 PROCEDURE — P9045: CPT

## 2022-08-11 PROCEDURE — 88311 DECALCIFY TISSUE: CPT

## 2022-08-11 PROCEDURE — 36430 TRANSFUSION BLD/BLD COMPNT: CPT

## 2022-08-11 PROCEDURE — 86901 BLOOD TYPING SEROLOGIC RH(D): CPT

## 2022-08-11 PROCEDURE — C1889: CPT

## 2022-08-11 PROCEDURE — 70450 CT HEAD/BRAIN W/O DYE: CPT

## 2022-08-11 PROCEDURE — 84132 ASSAY OF SERUM POTASSIUM: CPT

## 2022-08-11 PROCEDURE — 93306 TTE W/DOPPLER COMPLETE: CPT

## 2022-08-11 PROCEDURE — 83605 ASSAY OF LACTIC ACID: CPT

## 2022-08-11 PROCEDURE — 80048 BASIC METABOLIC PNL TOTAL CA: CPT

## 2022-08-11 PROCEDURE — U0005: CPT

## 2022-08-11 PROCEDURE — U0003: CPT

## 2022-08-11 PROCEDURE — 86965 POOLING BLOOD PLATELETS: CPT

## 2022-08-11 PROCEDURE — 82330 ASSAY OF CALCIUM: CPT

## 2022-08-11 PROCEDURE — 82553 CREATINE MB FRACTION: CPT

## 2022-08-11 PROCEDURE — 83880 ASSAY OF NATRIURETIC PEPTIDE: CPT

## 2022-08-11 PROCEDURE — 85025 COMPLETE CBC W/AUTO DIFF WBC: CPT

## 2022-08-11 PROCEDURE — 86923 COMPATIBILITY TEST ELECTRIC: CPT

## 2022-08-11 PROCEDURE — 97535 SELF CARE MNGMENT TRAINING: CPT

## 2022-08-11 PROCEDURE — 86850 RBC ANTIBODY SCREEN: CPT

## 2022-08-11 PROCEDURE — C1768: CPT

## 2022-08-11 PROCEDURE — 71046 X-RAY EXAM CHEST 2 VIEWS: CPT

## 2022-08-11 PROCEDURE — 80061 LIPID PANEL: CPT

## 2022-08-11 PROCEDURE — 84484 ASSAY OF TROPONIN QUANT: CPT

## 2022-12-12 ENCOUNTER — NON-APPOINTMENT (OUTPATIENT)
Age: 62
End: 2022-12-12

## 2022-12-21 ENCOUNTER — APPOINTMENT (OUTPATIENT)
Dept: CARDIOTHORACIC SURGERY | Facility: CLINIC | Age: 62
End: 2022-12-21
Payer: MEDICAID

## 2023-01-04 ENCOUNTER — NON-APPOINTMENT (OUTPATIENT)
Age: 63
End: 2023-01-04

## 2023-01-04 ENCOUNTER — APPOINTMENT (OUTPATIENT)
Dept: CARDIOTHORACIC SURGERY | Facility: CLINIC | Age: 63
End: 2023-01-04
Payer: MEDICAID

## 2023-01-04 VITALS
DIASTOLIC BLOOD PRESSURE: 119 MMHG | RESPIRATION RATE: 17 BRPM | TEMPERATURE: 97.3 F | OXYGEN SATURATION: 96 % | BODY MASS INDEX: 25.62 KG/M2 | HEIGHT: 71 IN | WEIGHT: 183 LBS | SYSTOLIC BLOOD PRESSURE: 195 MMHG | HEART RATE: 72 BPM

## 2023-01-04 DIAGNOSIS — Z09 ENCOUNTER FOR FOLLOW-UP EXAMINATION AFTER COMPLETED TREATMENT FOR CONDITIONS OTHER THAN MALIGNANT NEOPLASM: ICD-10-CM

## 2023-01-04 DIAGNOSIS — Z95.828 PRESENCE OF OTHER VASCULAR IMPLANTS AND GRAFTS: ICD-10-CM

## 2023-01-04 PROCEDURE — 99214 OFFICE O/P EST MOD 30 MIN: CPT

## 2023-01-05 PROBLEM — Z95.828 S/P ASCENDING AORTIC REPLACEMENT: Status: ACTIVE | Noted: 2022-06-17

## 2023-01-05 PROBLEM — Z09 POSTOP CHECK: Status: ACTIVE | Noted: 2022-06-17

## 2023-01-12 RX ORDER — OMEPRAZOLE 20 MG/1
20 CAPSULE, DELAYED RELEASE ORAL DAILY
Qty: 90 | Refills: 0 | Status: ACTIVE | COMMUNITY
Start: 2023-01-12 | End: 1900-01-01

## 2023-01-12 RX ORDER — ELECTROLYTES/DEXTROSE
SOLUTION, ORAL ORAL DAILY
Qty: 90 | Refills: 0 | Status: ACTIVE | COMMUNITY
Start: 2023-01-12 | End: 1900-01-01

## 2023-01-12 RX ORDER — FOLIC ACID 1 MG/1
1 TABLET ORAL DAILY
Qty: 30 | Refills: 0 | Status: ACTIVE | COMMUNITY
Start: 2022-06-22 | End: 1900-01-01

## 2023-01-12 RX ORDER — LABETALOL HYDROCHLORIDE 300 MG/1
300 TABLET, FILM COATED ORAL EVERY 6 HOURS
Qty: 240 | Refills: 2 | Status: ACTIVE | COMMUNITY
Start: 2022-06-06 | End: 1900-01-01

## 2023-01-12 RX ORDER — ASPIRIN 81 MG/1
81 TABLET, FILM COATED ORAL DAILY
Qty: 90 | Refills: 0 | Status: ACTIVE | COMMUNITY
Start: 2022-06-22 | End: 1900-01-01

## 2023-01-12 RX ORDER — AMLODIPINE BESYLATE 10 MG/1
10 TABLET ORAL
Qty: 90 | Refills: 0 | Status: ACTIVE | COMMUNITY
Start: 2022-06-06 | End: 1900-01-01

## 2023-02-03 ENCOUNTER — NON-APPOINTMENT (OUTPATIENT)
Age: 63
End: 2023-02-03

## (undated) DEVICE — DRSG DERMABOND 0.7ML

## (undated) DEVICE — INS ST DBL SAFEJAW FGRTY

## (undated) DEVICE — CARDIOPLEGIA MANAGEMENT SET COLOR CODED CLAMPS

## (undated) DEVICE — VASCULAR DILATOR KIT 8,12,16,20, 24FR

## (undated) DEVICE — VESSEL LOOP MAXI-BLUE 0.120" X 16"

## (undated) DEVICE — TOURNIQUET SET 12FR (1 RED, 1 BLUE, 3 CLEAR, 1 SNARE) 7"

## (undated) DEVICE — SUCTION YANKAUER BULBOUS TIP NO VENT

## (undated) DEVICE — TUBING BRAT 2 SUCTION ASSEMBLY TWIST LOCK

## (undated) DEVICE — RIGID ADULT SUCKER

## (undated) DEVICE — SUT PROLENE 4-0 36" SH

## (undated) DEVICE — CATH TRIOX OXIMETRY 8F 3 LUMENS

## (undated) DEVICE — TUBING SMOKE EVAC 3/8" X 10FT FOR NEPTUNE

## (undated) DEVICE — SUT PROLENE 3-0 36" SH-1

## (undated) DEVICE — CONNECTOR STRAIGHT 3/8 X 1/2"

## (undated) DEVICE — MARKING PEN W RULER

## (undated) DEVICE — GOWN LG

## (undated) DEVICE — VENTING ADAPTER "Y" (RED/BLUE) 7.5"

## (undated) DEVICE — SUT VICRYL 1 36" CTX UNDYED

## (undated) DEVICE — TONGUE DEPRESSOR

## (undated) DEVICE — DRAPE SLUSH / WARMER 44 X 66"

## (undated) DEVICE — Device

## (undated) DEVICE — SUT PROLENE 5-0 36" RB-1

## (undated) DEVICE — PACK PROCEDURE HARVEST SMARTPREP APC-60

## (undated) DEVICE — CONNECTOR STRAIGHT 1/4 X 3/8"

## (undated) DEVICE — PACK PROC CV DRAPE

## (undated) DEVICE — APPLICATOR PACK

## (undated) DEVICE — FOLEY TRAY 16FR 5CC LF LUBRISIL ADVANCE TEMP CLOSED

## (undated) DEVICE — TOURNIQUET SET 12FR (1 RED, 2 BLUE, 3 CLEAR, 1 SNARE) 5.5"

## (undated) DEVICE — DRAPE MICROSHIELD FOR LEICA W CLEARLENS 41X64"

## (undated) DEVICE — GLV 7 PROTEXIS (WHITE)

## (undated) DEVICE — DRAPE IOBAN 33" X 23"

## (undated) DEVICE — SUT VICRYL 2-0 27" CT-1

## (undated) DEVICE — SUT DOUBLE 6 WIRE STERNAL

## (undated) DEVICE — GLV 8.5 PROTEXIS (WHITE)

## (undated) DEVICE — DRSG BIOPATCH DISK W CHG 1" W 4.0MM HOLE

## (undated) DEVICE — CATH CV TRAY INSR ST UNIV

## (undated) DEVICE — POSITIONER FOAM EGG CRATE ULNAR 2PCS (PINK)

## (undated) DEVICE — DRSG TAPE UMBILICAL COTTON 2" X 30 X 1/8"

## (undated) DEVICE — SUCTION CATH AIRLIFE CONTROL VALVE TRIFLO 14FR

## (undated) DEVICE — SUT PROLENE 5-0 30" RB-2

## (undated) DEVICE — CATH NG SALEM SUMP 16FR

## (undated) DEVICE — TUBING SUCTION NONCONDUCTIVE 6MM X 12FT

## (undated) DEVICE — PACING CABLE (BROWN) A/V TEMP SCREW DOWN 12FT

## (undated) DEVICE — DRSG TEGADERM 4X4.75"

## (undated) DEVICE — KIT APPLICATOR SPRAY FOR HARVEST SYSTEM

## (undated) DEVICE — DRSG TRACH DRAINAGE 4X4

## (undated) DEVICE — SUT PLEDGET 9MM X 4MM X 1.5MM

## (undated) DEVICE — SUT SILK 5-0 60" TIES

## (undated) DEVICE — SUT SILK 2-0 18" SH (POP-OFF)

## (undated) DEVICE — SUT VICRYL 0 27" CT

## (undated) DEVICE — PACK OPEN HEART LNX

## (undated) DEVICE — SUT PDS II 2-0 27" CT-1

## (undated) DEVICE — SUT MONOCRYL 4-0 27" PS-2 UNDYED

## (undated) DEVICE — BLADE SCALPEL SAFETY #15 WITH PLASTIC GREEN HANDLE

## (undated) DEVICE — SUT NUROLON 1 18" OS-8 (POP-OFF)

## (undated) DEVICE — SUT PLEDGET SOFT MEDIUM 1/4" X 1/8" X 1/16" X6

## (undated) DEVICE — PREP SCRUB BRUSH W CHG 4%

## (undated) DEVICE — ELCTR BOVIE TIP BLADE INSULATED 4" EDGE

## (undated) DEVICE — DRSG MEPILEX 10 X 25CM (4 X 10") AG

## (undated) DEVICE — SUT BOOT STANDARD (ORIGINAL YELLOW) 5 PAIR

## (undated) DEVICE — SUT PDS II 0 27" CT-1

## (undated) DEVICE — DRAIN RESERVOIR FOR JACKSON PRATT 100CC CARDINAL

## (undated) DEVICE — DRSG TEGADERM CHG 4X6-1/8"

## (undated) DEVICE — SUT PROLENE 4-0 36" RB-1

## (undated) DEVICE — SPECIMEN CONTAINER 100ML

## (undated) DEVICE — SUT PROLENE 3-0 36" SH

## (undated) DEVICE — APPLICATION TIP

## (undated) DEVICE — BLADE SCALPEL SAFETY #11 WITH PLASTIC GREEN HANDLE

## (undated) DEVICE — ELCTR CAUTERY HI TEMP SHAFT EXT 2"

## (undated) DEVICE — DRAPE PROBE COVER 5" X 96"

## (undated) DEVICE — SUMP INTRACARDIAC/PERICARDIAL 20FR 1/4" ADULT

## (undated) DEVICE — NDL COUNTER FOAM AND MAGNET 40-70

## (undated) DEVICE — SUT ETHIBOND 3-0 36" RB-1

## (undated) DEVICE — DRSG MEPILEX 10 X 10CM (4 X 4") AG

## (undated) DEVICE — DRAPE TOWEL BLUE 17" X 24"

## (undated) DEVICE — SUT STAINLESS STEEL 7 4-18" CCS

## (undated) DEVICE — PACING CABLE (BLUE) ATRIAL TEMP SCREW DOWN 12FT

## (undated) DEVICE — PHRENIC NERVE PAD MEDIUM

## (undated) DEVICE — SUT PROLENE 6-0 30" RB-2

## (undated) DEVICE — SUT PROLENE 3-0 36" RB-1

## (undated) DEVICE — NDL ANGIOGRAPHIC ADVANCED 18G X 7CM THIN (SMOOTH)

## (undated) DEVICE — ELCTR STRYKER NEPTUNE SMOKE EVACUATION PENCIL (GREEN)